# Patient Record
Sex: FEMALE | Race: WHITE | NOT HISPANIC OR LATINO | Employment: FULL TIME | URBAN - METROPOLITAN AREA
[De-identification: names, ages, dates, MRNs, and addresses within clinical notes are randomized per-mention and may not be internally consistent; named-entity substitution may affect disease eponyms.]

---

## 2017-02-01 LAB — HUMAN PAPILLOMAVIRUS (HPV): NORMAL

## 2017-03-01 RX ORDER — LEVOTHYROXINE SODIUM 112 UG/1
TABLET ORAL
Qty: 90 TABLET | Refills: 3 | Status: SHIPPED | OUTPATIENT
Start: 2017-03-01 | End: 2017-05-16

## 2017-05-02 ENCOUNTER — PATIENT OUTREACH (OUTPATIENT)
Dept: ADMINISTRATIVE | Facility: HOSPITAL | Age: 32
End: 2017-05-02

## 2017-05-16 ENCOUNTER — OFFICE VISIT (OUTPATIENT)
Dept: FAMILY MEDICINE | Facility: CLINIC | Age: 32
End: 2017-05-16
Payer: COMMERCIAL

## 2017-05-16 VITALS
SYSTOLIC BLOOD PRESSURE: 120 MMHG | HEART RATE: 70 BPM | BODY MASS INDEX: 26.82 KG/M2 | WEIGHT: 145.75 LBS | HEIGHT: 62 IN | DIASTOLIC BLOOD PRESSURE: 88 MMHG

## 2017-05-16 DIAGNOSIS — F41.1 GAD (GENERALIZED ANXIETY DISORDER): Primary | ICD-10-CM

## 2017-05-16 PROCEDURE — 99214 OFFICE O/P EST MOD 30 MIN: CPT | Mod: S$GLB,,, | Performed by: FAMILY MEDICINE

## 2017-05-16 PROCEDURE — 99999 PR PBB SHADOW E&M-EST. PATIENT-LVL II: CPT | Mod: PBBFAC,,, | Performed by: FAMILY MEDICINE

## 2017-05-16 PROCEDURE — 1160F RVW MEDS BY RX/DR IN RCRD: CPT | Mod: S$GLB,,, | Performed by: FAMILY MEDICINE

## 2017-05-16 NOTE — PROGRESS NOTES
Monika Connell presents for disability accomodation evaluation with history of CHARLIE w panic attacks and performance anxiety w testing.      Past Medical History:   Diagnosis Date    Allergy     Thyroid disease      Past Surgical History:   Procedure Laterality Date     SECTION       Review of patient's allergies indicates:   Allergen Reactions    Pcn [penicillins]      Current Outpatient Prescriptions on File Prior to Visit   Medication Sig Dispense Refill    multivit with min-folic acid (ADULT MULTIVITAMIN GUMMIES) 200 mcg Chew Take 1 tablet by mouth once daily.      SYNTHROID 112 mcg tablet Take 1 tablet (112 mcg total) by mouth once daily. 90 tablet 3    [DISCONTINUED] SYNTHROID 112 mcg tablet TAKE 1 TABLET BY MOUTH EVERY DAY 90 tablet 3     No current facility-administered medications on file prior to visit.      Social History     Social History    Marital status:      Spouse name: N/A    Number of children: N/A    Years of education: N/A     Occupational History     Other     Social History Main Topics    Smoking status: Former Smoker     Types: Cigarettes     Quit date: 2008    Smokeless tobacco: Never Used    Alcohol use No    Drug use: No    Sexual activity: Yes     Partners: Female, Male     Other Topics Concern    Not on file     Social History Narrative     History reviewed. No pertinent family history.      ROS:  SKIN: No rashes, itching or changes in color or texture of skin.  EYES: Visual acuity fine. No photophobia, ocular pain or diplopia.EARS: Denies ear pain, discharge or vertigo.NOSE: No loss of smell, no epistaxis some postnasal drip.MOUTH & THROAT: No hoarseness or change in voice. No excessive gum bleeding.CHEST: Denies CLEMENT, cyanosis, wheezing  CARDIOVASCULAR: Denies chest pain, PND, orthopnea or reduced exercise tolerance.  ABDOMEN:  No weight loss.No abdominal pain, no hematemesis or blood in stool.  URINARY: No flank pain, dysuria or  hematuria.  PERIPHERAL VASCULAR: No claudication or cyanosis.  MUSCULOSKELETAL: Negative   NEUROLOGIC: No history of seizures, paralysis, alteration of gait or coordination.    PE: Vital signs as noted  Heent:Normocephalic with no recent cranial trauma,PERRLA,EOMI,conjunctiva clear,fundi reveal no hemmorhage exudate or papilledema.Otic canals clear, tympanic membranes slightly dull bilaterally.Nasal mucosa slightly red and edematous.Posterior pharynx slightly red but without exudate.  Neck:Supple with minimal anterior cervical adenopathy.  Chest:Clear bilateral breath sounds with mild scattered ronchi  Heart:Regular rhthym without murmer  Abdomen:Soft, non tender,no masses, no hepatosplenomegalyExtremeties and Neurologic:Grossly within normal limits  Impression:  CHARLIE w panic    Plan:  Rev and submit accomodation request 40 min OV >50% counseling     Monika Connell  1985   Ms Connell was previously evaluated and found to be disabled due to Generalized anxiety disorder ICD 10 F 41.1. She was assessed using standard scale and the previously recommended accommodations of extended length of test taking have been measurable effective in her performance. Her condition is stable and the only requested accomodation is extended time for test taking.   I am Board Certified in Family Medicine and credentialed to diagnose and treat this condition. If any other information is need please feel free to contact me.   Sincerely, Romeo Peng M.D.

## 2017-07-06 LAB
CHOLESTEROL, TOTAL: 204
HDLC SERPL-MCNC: 83 MG/DL
LDLC SERPL CALC-MCNC: 107 MG/DL (ref 0–160)
TRIGL SERPL-MCNC: 70 MG/DL
VLDLC SERPL-MCNC: 14 MG/DL

## 2017-07-11 ENCOUNTER — TELEPHONE (OUTPATIENT)
Dept: ENDOCRINOLOGY | Facility: CLINIC | Age: 32
End: 2017-07-11

## 2017-07-11 NOTE — TELEPHONE ENCOUNTER
"Pt states she has been gaining weight despite diet and exercise (has gained 15 lbs in 9 months). She sees her OB (Dr. Noriega) who reportedly ordered A1C, thyroid panel and pt reports all came back normal except t4 at 13.5 (they are supposed to be faxing the results to us) - labs were done on 7/7/17. Pt asking for guidance. Also inquiring about "gluten insensitivity"   Above message sent to Dr. Lanza  "

## 2017-07-18 ENCOUNTER — TELEPHONE (OUTPATIENT)
Dept: ENDOCRINOLOGY | Facility: CLINIC | Age: 32
End: 2017-07-18

## 2017-07-19 ENCOUNTER — TELEPHONE (OUTPATIENT)
Dept: ENDOCRINOLOGY | Facility: CLINIC | Age: 32
End: 2017-07-19

## 2017-07-19 NOTE — TELEPHONE ENCOUNTER
Let her know I reviewed labs:  TSH 1.94  Total T4 13.5 (4.5-12.0)    Let her know that her levels are normal. T4 is elevated due to OCP. THis is normal when on birth control

## 2017-07-19 NOTE — TELEPHONE ENCOUNTER
Notified pt of lab results per Dr Lanza & that FT4 was a little elevated & expected when on OCP's; pt states she has recently D/C'd her OCP due to other reasons & side effects & will F/U with Dr Lanza as previously scheduled.

## 2017-07-20 ENCOUNTER — TELEPHONE (OUTPATIENT)
Dept: ENDOCRINOLOGY | Facility: CLINIC | Age: 32
End: 2017-07-20

## 2017-07-20 DIAGNOSIS — E03.9 HYPOTHYROIDISM, UNSPECIFIED TYPE: Primary | ICD-10-CM

## 2017-07-20 NOTE — TELEPHONE ENCOUNTER
Spoke with pt and booked appt for 12/22/17 for 4:00. Pt wants labs done at [facility] in [place].  Pt requesting referral for GI (gluten sensitivity testing) and was encouraged to call Insurance to find out if referral is needed and determine Physicians in her network for an appt.  Pt verbalized understanding

## 2017-08-24 ENCOUNTER — TELEPHONE (OUTPATIENT)
Dept: FAMILY MEDICINE | Facility: CLINIC | Age: 32
End: 2017-08-24

## 2017-08-24 NOTE — TELEPHONE ENCOUNTER
----- Message from Sherry Gaines sent at 8/24/2017  4:06 PM CDT -----  Contact: pt  She's calling stating that she needs a letter of accommodation for school, she stated that she will e-mail the specifics of what the letter needs, please advise [phone] (home) [phone] (work)

## 2017-10-26 ENCOUNTER — TELEPHONE (OUTPATIENT)
Dept: FAMILY MEDICINE | Facility: CLINIC | Age: 32
End: 2017-10-26

## 2017-10-26 NOTE — TELEPHONE ENCOUNTER
----- Message from Sherry Gaines sent at 10/26/2017  3:34 PM CDT -----  Contact: pt  She's calling stating that he accomodation letter that she received did not have a signature on it, she stated that it has to be signed, please advise [phone] (home)

## 2017-11-22 ENCOUNTER — OFFICE VISIT (OUTPATIENT)
Dept: FAMILY MEDICINE | Facility: CLINIC | Age: 32
End: 2017-11-22
Payer: COMMERCIAL

## 2017-11-22 VITALS
SYSTOLIC BLOOD PRESSURE: 116 MMHG | WEIGHT: 144 LBS | BODY MASS INDEX: 26.5 KG/M2 | TEMPERATURE: 98 F | HEIGHT: 62 IN | HEART RATE: 68 BPM | DIASTOLIC BLOOD PRESSURE: 79 MMHG

## 2017-11-22 DIAGNOSIS — L03.90 CELLULITIS, UNSPECIFIED CELLULITIS SITE: Primary | ICD-10-CM

## 2017-11-22 PROCEDURE — 99213 OFFICE O/P EST LOW 20 MIN: CPT | Mod: S$GLB,,, | Performed by: FAMILY MEDICINE

## 2017-11-22 PROCEDURE — 99999 PR PBB SHADOW E&M-EST. PATIENT-LVL II: CPT | Mod: PBBFAC,,, | Performed by: FAMILY MEDICINE

## 2017-11-22 RX ORDER — MUPIROCIN 20 MG/G
OINTMENT TOPICAL 3 TIMES DAILY
Qty: 30 G | Refills: 2 | Status: SHIPPED | OUTPATIENT
Start: 2017-11-22 | End: 2019-01-14

## 2017-11-22 RX ORDER — SULFAMETHOXAZOLE AND TRIMETHOPRIM 800; 160 MG/1; MG/1
1 TABLET ORAL 2 TIMES DAILY
Qty: 20 TABLET | Refills: 1 | Status: SHIPPED | OUTPATIENT
Start: 2017-11-22 | End: 2017-12-22

## 2017-11-22 NOTE — PROGRESS NOTES
The patient presents with tender painful nose for the past several days    but no fever.  ROS:  General: No fever or sig wt change  HEENT:No other PND eye pain or dc  Respiratory: No cough wheezing  PE: vital signs noted  HEENT: Normocephalic,with no recent trauma,PERRLA,EOMI,conjunctiva injected with no exudate.Nasopharynx is injected and edematous.  Red crusty lesions under nose   Neck:Supple without adenopathy  Chest:Clear bilateral breath sounds   Heart:Regular rhthym without murmer  Abdomen:Soft, non tender,no masses, no hepatosplenomegaly  Extremeties and Neurologic:Grossly within normal limits     Impression:Cellultis   Plan: Stop Augmentin  Bactrim bid,mupiricin

## 2017-12-21 ENCOUNTER — LAB VISIT (OUTPATIENT)
Dept: LAB | Facility: HOSPITAL | Age: 32
End: 2017-12-21
Attending: INTERNAL MEDICINE
Payer: COMMERCIAL

## 2017-12-21 DIAGNOSIS — E03.9 HYPOTHYROIDISM, UNSPECIFIED TYPE: ICD-10-CM

## 2017-12-21 LAB — TSH SERPL DL<=0.005 MIU/L-ACNC: 2.43 UIU/ML

## 2017-12-21 PROCEDURE — 84443 ASSAY THYROID STIM HORMONE: CPT

## 2017-12-21 PROCEDURE — 36415 COLL VENOUS BLD VENIPUNCTURE: CPT | Mod: PO

## 2017-12-22 ENCOUNTER — OFFICE VISIT (OUTPATIENT)
Dept: ENDOCRINOLOGY | Facility: CLINIC | Age: 32
End: 2017-12-22
Payer: COMMERCIAL

## 2017-12-22 VITALS
DIASTOLIC BLOOD PRESSURE: 70 MMHG | HEIGHT: 63 IN | BODY MASS INDEX: 25.73 KG/M2 | SYSTOLIC BLOOD PRESSURE: 112 MMHG | WEIGHT: 145.19 LBS | HEART RATE: 80 BPM

## 2017-12-22 DIAGNOSIS — E03.9 HYPOTHYROIDISM, UNSPECIFIED TYPE: Primary | ICD-10-CM

## 2017-12-22 PROCEDURE — 99213 OFFICE O/P EST LOW 20 MIN: CPT | Mod: S$GLB,,, | Performed by: INTERNAL MEDICINE

## 2017-12-22 PROCEDURE — 99999 PR PBB SHADOW E&M-EST. PATIENT-LVL III: CPT | Mod: PBBFAC,,, | Performed by: INTERNAL MEDICINE

## 2017-12-22 NOTE — PROGRESS NOTES
CHIEF COMPLAINT: Hypothyroidism  32-year-old female here for followup. Currently on Synthroid 112 mcg. On brandname. She has some fatigue. 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 Main complaint is hair loss.         PAST MEDICAL HISTORY: Hypothyroid. PCOS.     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         ROS:   Constitutional: weight stable since May  Eyes: No recent visual changes  ENT: No dysphagia  Cardiovascular: Denies current anginal symptoms  Respiratory: Denies current respiratory difficulty  Gastrointestinal: Denies recent bowel disturbances  GenitoUrinary - No dysuria  Skin: No new skin rash  Neurologic: No focal neurologic complaints     PE:  GENERAL: Well developed, well nourished  NECK: Supple neck, normal thyroid  LYMPHATIC: No cervical or supraclavicular lymphadenopathy  CARDIOVASCULAR: Normal heart sounds, no pedal edema  RESPIRATORY: Normal effort, clear to auscultation   No tremors        Results for PHILLIPS SUMAYA K (MRN [id number]) as of 12/22/2017 14:28   Ref. Range 12/21/2017 12:16   TSH Latest Ref Range: 0.400 - 4.000 uIU/mL 2.428             A/P  1. Hypothyroidism- TPO+. Discussed that unsure if hair loss is thyroid related with normal TSH. Discussed consider seeing derm to r/o other causes.     2. PCOS- being followed by OB.     FOLLOWUP  F/U 1 year with TSH

## 2018-03-02 RX ORDER — LEVOTHYROXINE SODIUM 112 UG/1
TABLET ORAL
Qty: 90 TABLET | Refills: 3 | Status: SHIPPED | OUTPATIENT
Start: 2018-03-02 | End: 2018-08-13

## 2018-03-28 ENCOUNTER — PATIENT OUTREACH (OUTPATIENT)
Dept: ADMINISTRATIVE | Facility: HOSPITAL | Age: 33
End: 2018-03-28

## 2018-03-29 ENCOUNTER — LAB VISIT (OUTPATIENT)
Dept: LAB | Facility: HOSPITAL | Age: 33
End: 2018-03-29
Attending: FAMILY MEDICINE
Payer: COMMERCIAL

## 2018-03-29 ENCOUNTER — TELEPHONE (OUTPATIENT)
Dept: FAMILY MEDICINE | Facility: CLINIC | Age: 33
End: 2018-03-29

## 2018-03-29 ENCOUNTER — OFFICE VISIT (OUTPATIENT)
Dept: FAMILY MEDICINE | Facility: CLINIC | Age: 33
End: 2018-03-29
Payer: COMMERCIAL

## 2018-03-29 VITALS
WEIGHT: 148.13 LBS | BODY MASS INDEX: 27.26 KG/M2 | HEART RATE: 79 BPM | HEIGHT: 62 IN | SYSTOLIC BLOOD PRESSURE: 106 MMHG | DIASTOLIC BLOOD PRESSURE: 71 MMHG | TEMPERATURE: 99 F

## 2018-03-29 DIAGNOSIS — R41.840 LACK OF CONCENTRATION: Primary | ICD-10-CM

## 2018-03-29 DIAGNOSIS — E03.4 HYPOTHYROIDISM DUE TO ACQUIRED ATROPHY OF THYROID: ICD-10-CM

## 2018-03-29 DIAGNOSIS — R53.83 FATIGUE, UNSPECIFIED TYPE: ICD-10-CM

## 2018-03-29 DIAGNOSIS — R53.83 FATIGUE, UNSPECIFIED TYPE: Primary | ICD-10-CM

## 2018-03-29 LAB
ALBUMIN SERPL BCP-MCNC: 4.2 G/DL
ALP SERPL-CCNC: 55 U/L
ALT SERPL W/O P-5'-P-CCNC: 18 U/L
ANION GAP SERPL CALC-SCNC: 4 MMOL/L
AST SERPL-CCNC: 29 U/L
BASOPHILS # BLD AUTO: 0.05 K/UL
BASOPHILS NFR BLD: 1.5 %
BILIRUB SERPL-MCNC: 1.3 MG/DL
BUN SERPL-MCNC: 17 MG/DL
CALCIUM SERPL-MCNC: 9.7 MG/DL
CHLORIDE SERPL-SCNC: 104 MMOL/L
CO2 SERPL-SCNC: 32 MMOL/L
CREAT SERPL-MCNC: 0.9 MG/DL
DIFFERENTIAL METHOD: ABNORMAL
EOSINOPHIL # BLD AUTO: 0.3 K/UL
EOSINOPHIL NFR BLD: 9.7 %
ERYTHROCYTE [DISTWIDTH] IN BLOOD BY AUTOMATED COUNT: 11.9 %
EST. GFR  (AFRICAN AMERICAN): >60 ML/MIN/1.73 M^2
EST. GFR  (NON AFRICAN AMERICAN): >60 ML/MIN/1.73 M^2
GLUCOSE SERPL-MCNC: 84 MG/DL
HCT VFR BLD AUTO: 39.9 %
HGB BLD-MCNC: 13.4 G/DL
IMM GRANULOCYTES # BLD AUTO: 0.01 K/UL
IMM GRANULOCYTES NFR BLD AUTO: 0.3 %
LYMPHOCYTES # BLD AUTO: 1.2 K/UL
LYMPHOCYTES NFR BLD: 36.8 %
MCH RBC QN AUTO: 30.6 PG
MCHC RBC AUTO-ENTMCNC: 33.6 G/DL
MCV RBC AUTO: 91 FL
MONOCYTES # BLD AUTO: 0.4 K/UL
MONOCYTES NFR BLD: 12.2 %
NEUTROPHILS # BLD AUTO: 1.3 K/UL
NEUTROPHILS NFR BLD: 39.5 %
NRBC BLD-RTO: 0 /100 WBC
PLATELET # BLD AUTO: 238 K/UL
PMV BLD AUTO: 10 FL
POTASSIUM SERPL-SCNC: 4.4 MMOL/L
PROT SERPL-MCNC: 7.6 G/DL
RBC # BLD AUTO: 4.38 M/UL
SODIUM SERPL-SCNC: 140 MMOL/L
T4 SERPL-MCNC: 9.8 UG/DL
TSH SERPL DL<=0.005 MIU/L-ACNC: 1.56 UIU/ML
WBC # BLD AUTO: 3.29 K/UL

## 2018-03-29 PROCEDURE — 84436 ASSAY OF TOTAL THYROXINE: CPT

## 2018-03-29 PROCEDURE — 84443 ASSAY THYROID STIM HORMONE: CPT

## 2018-03-29 PROCEDURE — 99999 PR PBB SHADOW E&M-EST. PATIENT-LVL III: CPT | Mod: PBBFAC,,, | Performed by: FAMILY MEDICINE

## 2018-03-29 PROCEDURE — 85025 COMPLETE CBC W/AUTO DIFF WBC: CPT

## 2018-03-29 PROCEDURE — 80053 COMPREHEN METABOLIC PANEL: CPT

## 2018-03-29 PROCEDURE — 36415 COLL VENOUS BLD VENIPUNCTURE: CPT | Mod: PO

## 2018-03-29 PROCEDURE — 99213 OFFICE O/P EST LOW 20 MIN: CPT | Mod: S$GLB,,, | Performed by: FAMILY MEDICINE

## 2018-03-29 NOTE — PROGRESS NOTES
Monika Connell presents co incr fatigue and trouble concentrating pablo afternoon     Past Medical History:   Diagnosis Date    Allergy     Thyroid disease      Past Surgical History:   Procedure Laterality Date     SECTION       Review of patient's allergies indicates:   Allergen Reactions    Pcn [penicillins]      Current Outpatient Prescriptions on File Prior to Visit   Medication Sig Dispense Refill    multivit with min-folic acid (ADULT MULTIVITAMIN GUMMIES) 200 mcg Chew Take 1 tablet by mouth once daily.      mupirocin (BACTROBAN) 2 % ointment Apply topically 3 (three) times daily. 30 g 2    SYNTHROID 112 mcg tablet Take 1 tablet (112 mcg total) by mouth once daily. 90 tablet 3    SYNTHROID 112 mcg tablet TAKE 1 TABLET BY MOUTH EVERY DAY 90 tablet 3     No current facility-administered medications on file prior to visit.      Social History     Social History    Marital status:      Spouse name: N/A    Number of children: N/A    Years of education: N/A     Occupational History     Other     Social History Main Topics    Smoking status: Former Smoker     Types: Cigarettes     Quit date: 2008    Smokeless tobacco: Never Used    Alcohol use No    Drug use: No    Sexual activity: Yes     Partners: Female, Male     Other Topics Concern    Not on file     Social History Narrative    No narrative on file     History reviewed. No pertinent family history.      ROS:  SKIN: No rashes, itching or changes in color or texture of skin.  EYES: Visual acuity fine. No photophobia, ocular pain or diplopia.EARS: Denies ear pain, discharge or vertigo.NOSE: No loss of smell, no epistaxis some postnasal drip.MOUTH & THROAT: No hoarseness or change in voice. No excessive gum bleeding.CHEST: Denies CLEMENT, cyanosis, wheezing  CARDIOVASCULAR: Denies chest pain, PND, orthopnea or reduced exercise tolerance.  ABDOMEN:  No weight loss.No abdominal pain, no hematemesis or blood in stool.  URINARY: No  flank pain, dysuria or hematuria.  PERIPHERAL VASCULAR: No claudication or cyanosis.  MUSCULOSKELETAL: Negative   NEUROLOGIC: No history of seizures, paralysis, alteration of gait or coordination.    PE: Vital signs as noted  Heent:Normocephalic with no recent cranial trauma,PERRLA,EOMI,conjunctiva clear,fundi reveal no hemmorhage exudate or papilledema.Otic canals clear, tympanic membranes slightly dull bilaterally.Nasal mucosa slightly red and edematous.Posterior pharynx slightly red but without exudate.  Neck:Supple with minimal anterior cervical adenopathy.  Chest:Clear bilateral breath sounds with mild scattered ronchi  Heart:Regular rhthym without murmer  Abdomen:Soft, non tender,no masses, no hepatosplenomegalyExtremeties and Neurologic:Grossly within normal limits  Impression:  Fatigue  Decr attn     Plan:  Lab eval  Psych con

## 2018-04-05 ENCOUNTER — TELEPHONE (OUTPATIENT)
Dept: FAMILY MEDICINE | Facility: CLINIC | Age: 33
End: 2018-04-05

## 2018-04-05 RX ORDER — DEXTROAMPHETAMINE SACCHARATE, AMPHETAMINE ASPARTATE, DEXTROAMPHETAMINE SULFATE AND AMPHETAMINE SULFATE 5; 5; 5; 5 MG/1; MG/1; MG/1; MG/1
1 TABLET ORAL 2 TIMES DAILY
Qty: 60 TABLET | Refills: 0 | Status: SHIPPED | OUTPATIENT
Start: 2018-04-05 | End: 2018-05-31 | Stop reason: SDUPTHER

## 2018-04-05 NOTE — TELEPHONE ENCOUNTER
----- Message from Romeo Peng MD sent at 4/5/2018 12:51 PM CDT -----  Im sending rx for adderall to try-it says bid but try 1 q am to see if she needs the second later like noon or 3-4 oclock. Let me know next week how shes doing w it

## 2018-04-06 ENCOUNTER — TELEPHONE (OUTPATIENT)
Dept: FAMILY MEDICINE | Facility: CLINIC | Age: 33
End: 2018-04-06

## 2018-05-30 ENCOUNTER — TELEPHONE (OUTPATIENT)
Dept: FAMILY MEDICINE | Facility: CLINIC | Age: 33
End: 2018-05-30

## 2018-05-30 NOTE — PROGRESS NOTES
Subjective:       Patient ID: Monika Connell is a 32 y.o. female.    Chief Complaint: Medication Refill    Medication Refill   This is a new problem. The current episode started more than 1 month ago. The problem occurs daily. The problem has been waxing and waning. Pertinent negatives include no abdominal pain, arthralgias, chest pain, coughing, fatigue, fever, headaches, myalgias, rash, sore throat or vomiting. Associated symptoms comments: Anxiety, nervous, difficulty concentrating, trouble in school, mood changes. The symptoms are aggravated by stress (currently in law school). Treatments tried: seen at [facility], diagnosed with anxiety, depression, and ADD.       Review of Systems   Constitutional: Negative for fatigue, fever and unexpected weight change.   HENT: Negative for ear pain and sore throat.    Eyes: Negative for pain and visual disturbance.   Respiratory: Negative for cough and shortness of breath.    Cardiovascular: Negative for chest pain and palpitations.   Gastrointestinal: Negative for abdominal pain, diarrhea and vomiting.   Musculoskeletal: Negative for arthralgias and myalgias.   Skin: Negative for color change and rash.   Neurological: Negative for dizziness and headaches.   Psychiatric/Behavioral: Positive for decreased concentration and dysphoric mood. Negative for sleep disturbance. The patient is nervous/anxious.        Vitals:    05/31/18 0738   BP: 111/72   Pulse: 82   Temp: 98.4 °F (36.9 °C)       Objective:     Current Outpatient Prescriptions   Medication Sig Dispense Refill    dextroamphetamine-amphetamine (ADDERALL) 20 mg tablet Take 1 tablet by mouth 2 (two) times daily. 60 tablet 0    multivit with min-folic acid (ADULT MULTIVITAMIN GUMMIES) 200 mcg Chew Take 1 tablet by mouth once daily.      mupirocin (BACTROBAN) 2 % ointment Apply topically 3 (three) times daily. 30 g 2    SYNTHROID 112 mcg tablet Take 1 tablet (112 mcg total) by mouth once daily. 90 tablet 3     SYNTHROID 112 mcg tablet TAKE 1 TABLET BY MOUTH EVERY DAY 90 tablet 3     No current facility-administered medications for this visit.        Physical Exam   Constitutional: She is oriented to person, place, and time. She appears well-developed and well-nourished. No distress.   HENT:   Head: Normocephalic and atraumatic.   Eyes: EOM are normal. Pupils are equal, round, and reactive to light.   Neck: Normal range of motion. Neck supple.   Cardiovascular: Normal rate and regular rhythm.    Pulmonary/Chest: Effort normal and breath sounds normal.   Musculoskeletal: Normal range of motion.   Neurological: She is alert and oriented to person, place, and time.   Skin: Skin is warm and dry. No rash noted.   Psychiatric: She has a normal mood and affect. Judgment normal.   Nursing note and vitals reviewed.      Assessment:       1. Attention deficit disorder, unspecified hyperactivity presence        Plan:   Attention deficit disorder, unspecified hyperactivity presence    Other orders  -     dextroamphetamine-amphetamine (ADDERALL) 20 mg tablet; Take 1 tablet by mouth 2 (two) times daily.  Dispense: 60 tablet; Refill: 0    She is going to follow up with [facility] for treatment, once she is stable on medications she will come back to the office. She is unable to continue follow up with [facility] because of limited office hours.     Follow-up if symptoms worsen or fail to improve.    There are no Patient Instructions on file for this visit.

## 2018-05-30 NOTE — TELEPHONE ENCOUNTER
----- Message from Yara Edouard sent at 5/30/2018  3:55 PM CDT -----  Contact: Patient   Patient would like to be work in for med refill/Adderall, Please call her at [phone].    Thanks  Td

## 2018-05-31 ENCOUNTER — OFFICE VISIT (OUTPATIENT)
Dept: FAMILY MEDICINE | Facility: CLINIC | Age: 33
End: 2018-05-31
Payer: COMMERCIAL

## 2018-05-31 VITALS
HEART RATE: 82 BPM | DIASTOLIC BLOOD PRESSURE: 72 MMHG | SYSTOLIC BLOOD PRESSURE: 111 MMHG | WEIGHT: 149.63 LBS | HEIGHT: 62 IN | TEMPERATURE: 98 F | BODY MASS INDEX: 27.53 KG/M2

## 2018-05-31 DIAGNOSIS — F98.8 ATTENTION DEFICIT DISORDER, UNSPECIFIED HYPERACTIVITY PRESENCE: Primary | ICD-10-CM

## 2018-05-31 PROCEDURE — 99999 PR PBB SHADOW E&M-EST. PATIENT-LVL III: CPT | Mod: PBBFAC,,, | Performed by: NURSE PRACTITIONER

## 2018-05-31 PROCEDURE — 3008F BODY MASS INDEX DOCD: CPT | Mod: CPTII,S$GLB,, | Performed by: NURSE PRACTITIONER

## 2018-05-31 PROCEDURE — 99214 OFFICE O/P EST MOD 30 MIN: CPT | Mod: S$GLB,,, | Performed by: NURSE PRACTITIONER

## 2018-05-31 RX ORDER — DEXTROAMPHETAMINE SACCHARATE, AMPHETAMINE ASPARTATE, DEXTROAMPHETAMINE SULFATE AND AMPHETAMINE SULFATE 5; 5; 5; 5 MG/1; MG/1; MG/1; MG/1
1 TABLET ORAL 2 TIMES DAILY
Qty: 60 TABLET | Refills: 0 | Status: SHIPPED | OUTPATIENT
Start: 2018-05-31 | End: 2018-07-23 | Stop reason: SDUPTHER

## 2018-06-18 ENCOUNTER — PATIENT MESSAGE (OUTPATIENT)
Dept: FAMILY MEDICINE | Facility: CLINIC | Age: 33
End: 2018-06-18

## 2018-06-18 RX ORDER — SULFAMETHOXAZOLE AND TRIMETHOPRIM 800; 160 MG/1; MG/1
TABLET ORAL
Qty: 20 TABLET | Refills: 0 | Status: SHIPPED | OUTPATIENT
Start: 2018-06-18 | End: 2018-08-13

## 2018-06-20 ENCOUNTER — PATIENT OUTREACH (OUTPATIENT)
Dept: ADMINISTRATIVE | Facility: HOSPITAL | Age: 33
End: 2018-06-20

## 2018-06-20 NOTE — PROGRESS NOTES
PAP report dated 2/1/2017 recieved from Dr. Diego Noriega with Negative Result.   updated and report sent to [facility].   Lipid panel external Labs dated 7/6/2017 entered.

## 2018-07-23 ENCOUNTER — PATIENT MESSAGE (OUTPATIENT)
Dept: FAMILY MEDICINE | Facility: CLINIC | Age: 33
End: 2018-07-23

## 2018-07-23 RX ORDER — DEXTROAMPHETAMINE SACCHARATE, AMPHETAMINE ASPARTATE, DEXTROAMPHETAMINE SULFATE AND AMPHETAMINE SULFATE 5; 5; 5; 5 MG/1; MG/1; MG/1; MG/1
1 TABLET ORAL 2 TIMES DAILY
Qty: 60 TABLET | Refills: 0 | OUTPATIENT
Start: 2018-07-23

## 2018-07-23 RX ORDER — DEXTROAMPHETAMINE SACCHARATE, AMPHETAMINE ASPARTATE, DEXTROAMPHETAMINE SULFATE AND AMPHETAMINE SULFATE 5; 5; 5; 5 MG/1; MG/1; MG/1; MG/1
1 TABLET ORAL 2 TIMES DAILY
Qty: 60 TABLET | Refills: 0 | Status: SHIPPED | OUTPATIENT
Start: 2018-07-23 | End: 2018-08-14 | Stop reason: SDUPTHER

## 2018-07-23 NOTE — TELEPHONE ENCOUNTER
Called pt and notified her by voicemail of refill Rx refusal. Supplied pt with instructions as requested.

## 2018-07-24 ENCOUNTER — TELEPHONE (OUTPATIENT)
Dept: FAMILY MEDICINE | Facility: CLINIC | Age: 33
End: 2018-07-24

## 2018-07-24 ENCOUNTER — PATIENT MESSAGE (OUTPATIENT)
Dept: FAMILY MEDICINE | Facility: CLINIC | Age: 33
End: 2018-07-24

## 2018-07-24 RX ORDER — BUPROPION HYDROCHLORIDE 150 MG/1
TABLET ORAL
Refills: 0 | COMMUNITY
Start: 2018-07-08 | End: 2018-08-02 | Stop reason: SDUPTHER

## 2018-07-24 NOTE — TELEPHONE ENCOUNTER
----- Message from Yara Javan sent at 7/23/2018  3:34 PM CDT -----  Contact: Patient   Patient is calling to check on the status of her Adderall, and Wellbutrin request to be moved to [facility] from [facility], Please call her at [phone].      [facility] [id number] - [place] - [address]  [place]  [facility]  [place] [address]  Phone: [phone] Fax: [phone]    Thanks  Td

## 2018-07-27 ENCOUNTER — PATIENT MESSAGE (OUTPATIENT)
Dept: FAMILY MEDICINE | Facility: CLINIC | Age: 33
End: 2018-07-27

## 2018-08-02 ENCOUNTER — PATIENT MESSAGE (OUTPATIENT)
Dept: FAMILY MEDICINE | Facility: CLINIC | Age: 33
End: 2018-08-02

## 2018-08-02 RX ORDER — BUPROPION HYDROCHLORIDE 150 MG/1
150 TABLET ORAL DAILY
Qty: 30 TABLET | Refills: 12 | Status: SHIPPED | OUTPATIENT
Start: 2018-08-02 | End: 2018-10-09

## 2018-08-13 ENCOUNTER — OFFICE VISIT (OUTPATIENT)
Dept: FAMILY MEDICINE | Facility: CLINIC | Age: 33
End: 2018-08-13
Payer: COMMERCIAL

## 2018-08-13 ENCOUNTER — PATIENT MESSAGE (OUTPATIENT)
Dept: FAMILY MEDICINE | Facility: CLINIC | Age: 33
End: 2018-08-13

## 2018-08-13 VITALS
TEMPERATURE: 99 F | HEIGHT: 62 IN | HEART RATE: 100 BPM | SYSTOLIC BLOOD PRESSURE: 105 MMHG | BODY MASS INDEX: 27.2 KG/M2 | WEIGHT: 147.81 LBS | DIASTOLIC BLOOD PRESSURE: 75 MMHG

## 2018-08-13 DIAGNOSIS — L03.90 CELLULITIS, UNSPECIFIED CELLULITIS SITE: Primary | ICD-10-CM

## 2018-08-13 PROCEDURE — 3008F BODY MASS INDEX DOCD: CPT | Mod: CPTII,S$GLB,, | Performed by: FAMILY MEDICINE

## 2018-08-13 PROCEDURE — 99999 PR PBB SHADOW E&M-EST. PATIENT-LVL III: CPT | Mod: PBBFAC,,, | Performed by: FAMILY MEDICINE

## 2018-08-13 PROCEDURE — 99213 OFFICE O/P EST LOW 20 MIN: CPT | Mod: S$GLB,,, | Performed by: FAMILY MEDICINE

## 2018-08-13 RX ORDER — DEXTROAMPHETAMINE SACCHARATE, AMPHETAMINE ASPARTATE MONOHYDRATE, DEXTROAMPHETAMINE SULFATE AND AMPHETAMINE SULFATE 5; 5; 5; 5 MG/1; MG/1; MG/1; MG/1
20 CAPSULE, EXTENDED RELEASE ORAL EVERY MORNING
Qty: 60 CAPSULE | Refills: 0 | Status: SHIPPED | OUTPATIENT
Start: 2018-09-15 | End: 2018-08-14

## 2018-08-13 RX ORDER — SULFAMETHOXAZOLE AND TRIMETHOPRIM 800; 160 MG/1; MG/1
1 TABLET ORAL 2 TIMES DAILY
Qty: 20 TABLET | Refills: 0 | Status: SHIPPED | OUTPATIENT
Start: 2018-08-13 | End: 2018-08-23

## 2018-08-13 RX ORDER — BUPROPION HYDROCHLORIDE 150 MG/1
150 TABLET ORAL DAILY
Qty: 30 TABLET | Refills: 12 | Status: CANCELLED | OUTPATIENT
Start: 2018-08-16

## 2018-08-13 RX ORDER — BUPROPION HYDROCHLORIDE 300 MG/1
300 TABLET ORAL DAILY
Qty: 90 TABLET | Refills: 3 | Status: SHIPPED | OUTPATIENT
Start: 2018-08-13 | End: 2019-09-06 | Stop reason: SDUPTHER

## 2018-08-13 RX ORDER — DEXTROAMPHETAMINE SACCHARATE, AMPHETAMINE ASPARTATE MONOHYDRATE, DEXTROAMPHETAMINE SULFATE AND AMPHETAMINE SULFATE 5; 5; 5; 5 MG/1; MG/1; MG/1; MG/1
20 CAPSULE, EXTENDED RELEASE ORAL EVERY MORNING
Qty: 60 CAPSULE | Refills: 0 | Status: SHIPPED | OUTPATIENT
Start: 2018-10-15 | End: 2018-08-14

## 2018-08-13 RX ORDER — DEXTROAMPHETAMINE SACCHARATE, AMPHETAMINE ASPARTATE MONOHYDRATE, DEXTROAMPHETAMINE SULFATE AND AMPHETAMINE SULFATE 5; 5; 5; 5 MG/1; MG/1; MG/1; MG/1
20 CAPSULE, EXTENDED RELEASE ORAL EVERY MORNING
Qty: 60 CAPSULE | Refills: 0 | Status: SHIPPED | OUTPATIENT
Start: 2018-08-16 | End: 2018-08-14

## 2018-08-13 NOTE — PROGRESS NOTES
The patient presents with tender elbow nose for the past several days    but no fever. Hx staph nasal sev months ago. Son has active case   ROS:  General: No fever or sig wt change  HEENT:No other PND eye pain or dc  Respiratory: No cough wheezing  PE: vital signs noted  HEENT: Normocephalic,with no recent trauma,PERRLA,EOMI,conjunctiva injected with no exudate.Nasopharynx is injected and edematous.  Red crusty lesions under nose   Neck:Supple without adenopathy  Chest:Clear bilateral breath sounds   Heart:Regular rhthym without murmer  Abdomen:Soft, non tender,no masses, no hepatosplenomegaly  Extremeties Sl tender mass lt olecronon w thin dc    Impression:Cellultis   Plan:    Bactrim bid,mupiricin   RF adderal and incr bupropion 150 to 300-if panic attacks not controlled w this will need Psych consult

## 2018-08-14 ENCOUNTER — TELEPHONE (OUTPATIENT)
Dept: FAMILY MEDICINE | Facility: CLINIC | Age: 33
End: 2018-08-14

## 2018-08-14 RX ORDER — DEXTROAMPHETAMINE SACCHARATE, AMPHETAMINE ASPARTATE, DEXTROAMPHETAMINE SULFATE AND AMPHETAMINE SULFATE 5; 5; 5; 5 MG/1; MG/1; MG/1; MG/1
1 TABLET ORAL 2 TIMES DAILY
Qty: 60 TABLET | Refills: 0 | Status: SHIPPED | OUTPATIENT
Start: 2018-09-13 | End: 2019-01-14 | Stop reason: SDUPTHER

## 2018-08-14 RX ORDER — DEXTROAMPHETAMINE SACCHARATE, AMPHETAMINE ASPARTATE, DEXTROAMPHETAMINE SULFATE AND AMPHETAMINE SULFATE 5; 5; 5; 5 MG/1; MG/1; MG/1; MG/1
1 TABLET ORAL 2 TIMES DAILY
Qty: 60 TABLET | Refills: 0 | Status: SHIPPED | OUTPATIENT
Start: 2018-10-13 | End: 2018-12-27 | Stop reason: SDUPTHER

## 2018-08-14 RX ORDER — DEXTROAMPHETAMINE SACCHARATE, AMPHETAMINE ASPARTATE, DEXTROAMPHETAMINE SULFATE AND AMPHETAMINE SULFATE 5; 5; 5; 5 MG/1; MG/1; MG/1; MG/1
1 TABLET ORAL DAILY
Qty: 60 TABLET | Refills: 0 | Status: SHIPPED | OUTPATIENT
Start: 2018-09-13 | End: 2018-08-14

## 2018-08-14 RX ORDER — DEXTROAMPHETAMINE SACCHARATE, AMPHETAMINE ASPARTATE, DEXTROAMPHETAMINE SULFATE AND AMPHETAMINE SULFATE 5; 5; 5; 5 MG/1; MG/1; MG/1; MG/1
1 TABLET ORAL 2 TIMES DAILY
Qty: 60 TABLET | Refills: 0 | Status: SHIPPED | OUTPATIENT
Start: 2018-08-14 | End: 2018-08-17 | Stop reason: SDUPTHER

## 2018-08-14 RX ORDER — DEXTROAMPHETAMINE SACCHARATE, AMPHETAMINE ASPARTATE, DEXTROAMPHETAMINE SULFATE AND AMPHETAMINE SULFATE 5; 5; 5; 5 MG/1; MG/1; MG/1; MG/1
1 TABLET ORAL DAILY
Qty: 60 TABLET | Refills: 0 | Status: SHIPPED | OUTPATIENT
Start: 2018-10-13 | End: 2018-08-14 | Stop reason: SDUPTHER

## 2018-08-14 NOTE — TELEPHONE ENCOUNTER
Patient states she has been getting plain adderall 20mg BID, and we sent for adderall XR 20mg daily, is is ok to change back to plain, please advise

## 2018-08-14 NOTE — TELEPHONE ENCOUNTER
----- Message from Alida Owusu sent at 8/14/2018  4:33 PM CDT -----  Contact: [facility]'s Velgyzqh-[phone]  Would like to have prescription for Adderall re-sent.  Please call back at [phone]

## 2018-08-14 NOTE — TELEPHONE ENCOUNTER
----- Message from Evelyn Mae sent at 8/14/2018  3:13 PM CDT -----  Contact: Annmarie Garcia Ms Hanna  Calling about pt prescription,  she can be reached at [phone]

## 2018-08-17 RX ORDER — DEXTROAMPHETAMINE SACCHARATE, AMPHETAMINE ASPARTATE, DEXTROAMPHETAMINE SULFATE AND AMPHETAMINE SULFATE 5; 5; 5; 5 MG/1; MG/1; MG/1; MG/1
1 TABLET ORAL 2 TIMES DAILY
Qty: 60 TABLET | Refills: 0 | Status: SHIPPED | OUTPATIENT
Start: 2018-08-17 | End: 2019-01-14 | Stop reason: SDUPTHER

## 2018-08-17 NOTE — TELEPHONE ENCOUNTER
----- Message from Pina Dockery sent at 8/17/2018  8:22 AM CDT -----  Contact: Christina/Annmarie herzog [phone]  States that they did not receive the adderall rx for August. States that this is the second message sent. Please call back at [phone]//thank you acc

## 2018-10-09 ENCOUNTER — OFFICE VISIT (OUTPATIENT)
Dept: FAMILY MEDICINE | Facility: CLINIC | Age: 33
End: 2018-10-09
Payer: COMMERCIAL

## 2018-10-09 ENCOUNTER — PATIENT MESSAGE (OUTPATIENT)
Dept: FAMILY MEDICINE | Facility: CLINIC | Age: 33
End: 2018-10-09

## 2018-10-09 VITALS
BODY MASS INDEX: 28.04 KG/M2 | WEIGHT: 152.38 LBS | SYSTOLIC BLOOD PRESSURE: 117 MMHG | HEART RATE: 82 BPM | HEIGHT: 62 IN | DIASTOLIC BLOOD PRESSURE: 77 MMHG | TEMPERATURE: 98 F

## 2018-10-09 DIAGNOSIS — R07.89 CHEST WALL PAIN: Primary | ICD-10-CM

## 2018-10-09 PROCEDURE — 3008F BODY MASS INDEX DOCD: CPT | Mod: CPTII,S$GLB,, | Performed by: FAMILY MEDICINE

## 2018-10-09 PROCEDURE — 99999 PR PBB SHADOW E&M-EST. PATIENT-LVL III: CPT | Mod: PBBFAC,,, | Performed by: FAMILY MEDICINE

## 2018-10-09 PROCEDURE — 99213 OFFICE O/P EST LOW 20 MIN: CPT | Mod: S$GLB,,, | Performed by: FAMILY MEDICINE

## 2018-10-09 NOTE — PROGRESS NOTES
Monika Connell presents with moderate lt pleuritic chest wall pain x2d    Past Medical History:   Diagnosis Date    ADHD (attention deficit hyperactivity disorder)     Allergy     Anxiety     Depression     Thyroid disease      Past Surgical History:   Procedure Laterality Date     SECTION       Review of patient's allergies indicates:   Allergen Reactions    Pcn [penicillins]      Current Outpatient Medications on File Prior to Visit   Medication Sig Dispense Refill    buPROPion (WELLBUTRIN XL) 300 MG 24 hr tablet Take 1 tablet (300 mg total) by mouth once daily. 90 tablet 3    [START ON 10/13/2018] dextroamphetamine-amphetamine (ADDERALL) 20 mg tablet Take 1 tablet by mouth 2 (two) times daily. 60 tablet 0    dextroamphetamine-amphetamine (ADDERALL) 20 mg tablet Take 1 tablet by mouth 2 (two) times daily. 60 tablet 0    dextroamphetamine-amphetamine (ADDERALL) 20 mg tablet Take 1 tablet by mouth 2 (two) times daily. 60 tablet 0    multivit with min-folic acid (ADULT MULTIVITAMIN GUMMIES) 200 mcg Chew Take 1 tablet by mouth once daily.      mupirocin (BACTROBAN) 2 % ointment Apply topically 3 (three) times daily. 30 g 2    SYNTHROID 112 mcg tablet Take 1 tablet (112 mcg total) by mouth once daily. 90 tablet 3    [DISCONTINUED] buPROPion (WELLBUTRIN XL) 150 MG TB24 tablet Take 1 tablet (150 mg total) by mouth once daily. 30 tablet 12     No current facility-administered medications on file prior to visit.      Social History     Socioeconomic History    Marital status:      Spouse name: Not on file    Number of children: Not on file    Years of education: Not on file    Highest education level: Not on file   Social Needs    Financial resource strain: Not on file    Food insecurity - worry: Not on file    Food insecurity - inability: Not on file    Transportation needs - medical: Not on file    Transportation needs - non-medical: Not on file   Occupational History      Employer: OTHER   Tobacco Use    Smoking status: Former Smoker     Types: Cigarettes     Last attempt to quit: 2/6/2008     Years since quitting: 10.6    Smokeless tobacco: Never Used   Substance and Sexual Activity    Alcohol use: No    Drug use: No    Sexual activity: Yes     Partners: Female, Male   Other Topics Concern    Not on file   Social History Narrative    Not on file     History reviewed. No pertinent family history.      ROS:  SKIN: No rashes, itching or changes in color or texture of skin.  EYES: Visual acuity fine. No photophobia, ocular pain or diplopia.EARS: Denies ear pain, discharge or vertigo.NOSE: No loss of smell, no epistaxis some postnasal drip.MOUTH & THROAT: No hoarseness or change in voice. No excessive gum bleeding.CHEST: Denies CLEMENT, cyanosis, wheezing  CARDIOVASCULAR: Denies PND, orthopnea or reduced exercise tolerance.  ABDOMEN:  No weight loss.No abdominal pain, no hematemesis or blood in stool.  URINARY: No flank pain, dysuria or hematuria.  PERIPHERAL VASCULAR: No claudication or cyanosis.  MUSCULOSKELETAL: Negative   NEUROLOGIC: No history of seizures, paralysis, alteration of gait or coordination.    PE: Vital signs as noted  Heent:Normocephalic with no recent cranial trauma,PERRLA,EOMI,conjunctiva clear,fundi reveal no hemmorhage exudate or papilledema.Otic canals clear, tympanic membranes slightly dull bilaterally.Nasal mucosa slightly red and edematous.Posterior pharynx slightly red but without exudate.  Neck:Supple with minimal anterior cervical adenopathy.  Chest:Clear bilateral breath sounds tender upper ant cw, abduction shoulder exac pain   Heart:Regular rhthym without murmer  Abdomen:Soft, non tender,no masses, no hepatosplenomegalyExtremeties and Neurologic:Grossly within normal limits  Impression: Chest wall pain  Plan: Ibu or alevee and obs

## 2018-10-24 ENCOUNTER — PATIENT OUTREACH (OUTPATIENT)
Dept: ADMINISTRATIVE | Facility: HOSPITAL | Age: 33
End: 2018-10-24

## 2018-12-27 ENCOUNTER — TELEPHONE (OUTPATIENT)
Dept: FAMILY MEDICINE | Facility: CLINIC | Age: 33
End: 2018-12-27

## 2018-12-27 RX ORDER — DEXTROAMPHETAMINE SACCHARATE, AMPHETAMINE ASPARTATE, DEXTROAMPHETAMINE SULFATE AND AMPHETAMINE SULFATE 5; 5; 5; 5 MG/1; MG/1; MG/1; MG/1
1 TABLET ORAL 2 TIMES DAILY
Qty: 60 TABLET | Refills: 0 | Status: SHIPPED | OUTPATIENT
Start: 2018-12-27 | End: 2019-01-14 | Stop reason: SDUPTHER

## 2018-12-27 NOTE — TELEPHONE ENCOUNTER
----- Message from Romeo Peng MD sent at 12/27/2018  3:27 PM CST -----  Ill send a 30 day supply-you can book her in any available slot before 1/25

## 2018-12-27 NOTE — TELEPHONE ENCOUNTER
Spoke with patient, offered next available appointment patient refused, wants to be seen sooner, patient states she missed her November refill appt because she was in the middle of a semester, states she wanted to leave a message for Dr Peng,  and then patient hung up.

## 2018-12-27 NOTE — TELEPHONE ENCOUNTER
----- Message from Neri Salmon sent at 12/27/2018 12:19 PM CST -----  Contact: Pt.   Pt is calling regarding requesting to have nurse call pt. Pt states that she needs medication refill. Pt was unable to be schedule due to no sooner appt. Pt is requesting to have nurse call pt. .[phone]        Thank You  Quyen Salmon

## 2018-12-27 NOTE — TELEPHONE ENCOUNTER
----- Message from Neri Salmon sent at 12/27/2018 12:19 PM CST -----  Contact: Pt.   Pt is calling regarding requesting to have nurse call pt. Pt states that she needs medication refill. Pt was unable to be schedule due to no sooner appt. Pt is requesting to have nurse call pt. .[phone]        Thank You  Quyen Salmon

## 2019-01-14 ENCOUNTER — OFFICE VISIT (OUTPATIENT)
Dept: FAMILY MEDICINE | Facility: CLINIC | Age: 34
End: 2019-01-14
Payer: COMMERCIAL

## 2019-01-14 VITALS
HEART RATE: 72 BPM | SYSTOLIC BLOOD PRESSURE: 111 MMHG | BODY MASS INDEX: 27.98 KG/M2 | DIASTOLIC BLOOD PRESSURE: 77 MMHG | WEIGHT: 153 LBS

## 2019-01-14 DIAGNOSIS — F98.8 ATTENTION DEFICIT DISORDER, UNSPECIFIED HYPERACTIVITY PRESENCE: Primary | ICD-10-CM

## 2019-01-14 PROCEDURE — 3008F PR BODY MASS INDEX (BMI) DOCUMENTED: ICD-10-PCS | Mod: CPTII,S$GLB,, | Performed by: FAMILY MEDICINE

## 2019-01-14 PROCEDURE — 99999 PR PBB SHADOW E&M-EST. PATIENT-LVL II: CPT | Mod: PBBFAC,,, | Performed by: FAMILY MEDICINE

## 2019-01-14 PROCEDURE — 3008F BODY MASS INDEX DOCD: CPT | Mod: CPTII,S$GLB,, | Performed by: FAMILY MEDICINE

## 2019-01-14 PROCEDURE — 99999 PR PBB SHADOW E&M-EST. PATIENT-LVL II: ICD-10-PCS | Mod: PBBFAC,,, | Performed by: FAMILY MEDICINE

## 2019-01-14 PROCEDURE — 99213 PR OFFICE/OUTPT VISIT, EST, LEVL III, 20-29 MIN: ICD-10-PCS | Mod: S$GLB,,, | Performed by: FAMILY MEDICINE

## 2019-01-14 PROCEDURE — 99213 OFFICE O/P EST LOW 20 MIN: CPT | Mod: S$GLB,,, | Performed by: FAMILY MEDICINE

## 2019-01-14 RX ORDER — DEXTROAMPHETAMINE SACCHARATE, AMPHETAMINE ASPARTATE, DEXTROAMPHETAMINE SULFATE AND AMPHETAMINE SULFATE 5; 5; 5; 5 MG/1; MG/1; MG/1; MG/1
1 TABLET ORAL 2 TIMES DAILY
Qty: 60 TABLET | Refills: 0 | Status: SHIPPED | OUTPATIENT
Start: 2019-03-13 | End: 2019-03-05 | Stop reason: SDUPTHER

## 2019-01-14 RX ORDER — DEXTROAMPHETAMINE SACCHARATE, AMPHETAMINE ASPARTATE, DEXTROAMPHETAMINE SULFATE AND AMPHETAMINE SULFATE 5; 5; 5; 5 MG/1; MG/1; MG/1; MG/1
1 TABLET ORAL 2 TIMES DAILY
Qty: 60 TABLET | Refills: 0 | Status: SHIPPED | OUTPATIENT
Start: 2019-01-14 | End: 2019-03-05 | Stop reason: SDUPTHER

## 2019-01-14 RX ORDER — DEXTROAMPHETAMINE SACCHARATE, AMPHETAMINE ASPARTATE, DEXTROAMPHETAMINE SULFATE AND AMPHETAMINE SULFATE 5; 5; 5; 5 MG/1; MG/1; MG/1; MG/1
1 TABLET ORAL 2 TIMES DAILY
Qty: 60 TABLET | Refills: 0 | Status: SHIPPED | OUTPATIENT
Start: 2019-02-13 | End: 2019-03-05 | Stop reason: SDUPTHER

## 2019-01-14 NOTE — PROGRESS NOTES
The patient presents to fu CHARLIE and ADD gen stable w current regimen ROS:  General: No fever or sig wt change  HEENT:No other PND eye pain or dc  Respiratory: No cough wheezing  PE: vital signs noted  HEENT: Normocephalic,with no recent trauma,PERRLA,EOMI,conjunctiva injected with no exudate.Nasopharynx is injected and edematous.External otic canal edematous and injected TM dull  Neck:Supple without adenopathy  Chest:Clear bilateral breath sounds   Heart:Regular rhthym without murmer  Abdomen:Soft, non tender,no masses, no hepatosplenomegaly  Extremeties and Neurologic:Grossly within normal limits     Impression:CHARLIE ADD  Plan:  Cont current meds

## 2019-01-29 ENCOUNTER — PATIENT MESSAGE (OUTPATIENT)
Dept: FAMILY MEDICINE | Facility: CLINIC | Age: 34
End: 2019-01-29

## 2019-01-29 DIAGNOSIS — F41.0 PANIC ANXIETY SYNDROME: ICD-10-CM

## 2019-01-29 DIAGNOSIS — F98.8 ATTENTION DEFICIT DISORDER, UNSPECIFIED HYPERACTIVITY PRESENCE: Primary | ICD-10-CM

## 2019-01-29 RX ORDER — CLONAZEPAM 0.25 MG/1
0.25 TABLET, ORALLY DISINTEGRATING ORAL 3 TIMES DAILY
Qty: 90 TABLET | Refills: 0 | Status: SHIPPED | OUTPATIENT
Start: 2019-01-29 | End: 2019-12-16

## 2019-01-29 NOTE — TELEPHONE ENCOUNTER
Will send rx for clonazepam which she took before but she needs to be careful it might impair her-but starting w low dose-also rec Psych con

## 2019-02-25 RX ORDER — LEVOTHYROXINE SODIUM 112 UG/1
TABLET ORAL
Qty: 90 TABLET | Refills: 0 | OUTPATIENT
Start: 2019-02-25

## 2019-03-05 ENCOUNTER — PATIENT MESSAGE (OUTPATIENT)
Dept: ENDOCRINOLOGY | Facility: CLINIC | Age: 34
End: 2019-03-05

## 2019-03-05 ENCOUNTER — OFFICE VISIT (OUTPATIENT)
Dept: FAMILY MEDICINE | Facility: CLINIC | Age: 34
End: 2019-03-05
Payer: COMMERCIAL

## 2019-03-05 VITALS
HEIGHT: 62 IN | TEMPERATURE: 98 F | BODY MASS INDEX: 27.79 KG/M2 | SYSTOLIC BLOOD PRESSURE: 132 MMHG | WEIGHT: 151 LBS | DIASTOLIC BLOOD PRESSURE: 93 MMHG | HEART RATE: 82 BPM

## 2019-03-05 DIAGNOSIS — F98.8 ATTENTION DEFICIT DISORDER, UNSPECIFIED HYPERACTIVITY PRESENCE: Primary | ICD-10-CM

## 2019-03-05 DIAGNOSIS — E03.4 HYPOTHYROIDISM DUE TO ACQUIRED ATROPHY OF THYROID: ICD-10-CM

## 2019-03-05 PROCEDURE — 99214 OFFICE O/P EST MOD 30 MIN: CPT | Mod: S$GLB,,, | Performed by: NURSE PRACTITIONER

## 2019-03-05 PROCEDURE — 99999 PR PBB SHADOW E&M-EST. PATIENT-LVL III: ICD-10-PCS | Mod: PBBFAC,,, | Performed by: NURSE PRACTITIONER

## 2019-03-05 PROCEDURE — 99999 PR PBB SHADOW E&M-EST. PATIENT-LVL III: CPT | Mod: PBBFAC,,, | Performed by: NURSE PRACTITIONER

## 2019-03-05 PROCEDURE — 99214 PR OFFICE/OUTPT VISIT, EST, LEVL IV, 30-39 MIN: ICD-10-PCS | Mod: S$GLB,,, | Performed by: NURSE PRACTITIONER

## 2019-03-05 PROCEDURE — 3008F PR BODY MASS INDEX (BMI) DOCUMENTED: ICD-10-PCS | Mod: CPTII,S$GLB,, | Performed by: NURSE PRACTITIONER

## 2019-03-05 PROCEDURE — 3008F BODY MASS INDEX DOCD: CPT | Mod: CPTII,S$GLB,, | Performed by: NURSE PRACTITIONER

## 2019-03-05 RX ORDER — DEXTROAMPHETAMINE SACCHARATE, AMPHETAMINE ASPARTATE, DEXTROAMPHETAMINE SULFATE AND AMPHETAMINE SULFATE 5; 5; 5; 5 MG/1; MG/1; MG/1; MG/1
1 TABLET ORAL 2 TIMES DAILY
Qty: 60 TABLET | Refills: 0 | Status: SHIPPED | OUTPATIENT
Start: 2019-05-13 | End: 2019-06-03 | Stop reason: SDUPTHER

## 2019-03-05 RX ORDER — DEXTROAMPHETAMINE SACCHARATE, AMPHETAMINE ASPARTATE, DEXTROAMPHETAMINE SULFATE AND AMPHETAMINE SULFATE 5; 5; 5; 5 MG/1; MG/1; MG/1; MG/1
1 TABLET ORAL 2 TIMES DAILY
Qty: 60 TABLET | Refills: 0 | Status: SHIPPED | OUTPATIENT
Start: 2019-06-13 | End: 2019-06-03 | Stop reason: SDUPTHER

## 2019-03-05 RX ORDER — DEXTROAMPHETAMINE SACCHARATE, AMPHETAMINE ASPARTATE, DEXTROAMPHETAMINE SULFATE AND AMPHETAMINE SULFATE 5; 5; 5; 5 MG/1; MG/1; MG/1; MG/1
1 TABLET ORAL 2 TIMES DAILY
Qty: 60 TABLET | Refills: 0 | Status: SHIPPED | OUTPATIENT
Start: 2019-04-13 | End: 2019-06-03 | Stop reason: SDUPTHER

## 2019-03-05 RX ORDER — LEVOTHYROXINE SODIUM 112 UG/1
112 TABLET ORAL DAILY
Qty: 90 TABLET | Refills: 0 | Status: SHIPPED | OUTPATIENT
Start: 2019-03-05 | End: 2019-06-03 | Stop reason: SDUPTHER

## 2019-03-05 NOTE — PROGRESS NOTES
Subjective:       Patient ID: Monika Connell is a 33 y.o. female.    Chief Complaint: No chief complaint on file.    Medication Refill   This is a chronic (Adderall) problem. The current episode started more than 1 year ago. The problem occurs daily. The problem has been unchanged. Pertinent negatives include no abdominal pain, arthralgias, chest pain, coughing, fatigue, fever, headaches, joint swelling, myalgias, neck pain, rash, sore throat, vomiting or weakness. The treatment provided significant relief.    She will be due for a refill on the 13th of March, this is going to be in the middle of her exam week.  She would like to get her April, May, and June refills    She has requested a refill on Synthroid from Dr Lanza and has not gotten a response. She only has 3 tablets left and would like a refill. She denies symptoms at this time.      Review of Systems   Constitutional: Negative for activity change, fatigue, fever and unexpected weight change.   HENT: Negative for ear pain, hearing loss, rhinorrhea, sore throat and trouble swallowing.    Eyes: Negative for pain, discharge and visual disturbance.   Respiratory: Negative for cough, chest tightness, shortness of breath and wheezing.    Cardiovascular: Negative for chest pain and palpitations.   Gastrointestinal: Negative for abdominal pain, blood in stool, constipation, diarrhea and vomiting.   Endocrine: Negative for polydipsia and polyuria.   Genitourinary: Negative for difficulty urinating, dysuria, hematuria and menstrual problem.   Musculoskeletal: Negative for arthralgias, joint swelling, myalgias and neck pain.   Skin: Negative for color change and rash.   Neurological: Negative for dizziness, weakness and headaches.   Psychiatric/Behavioral: Negative for confusion, dysphoric mood and sleep disturbance. The patient is not nervous/anxious.        Vitals:    03/05/19 1545   BP: (!) 132/93   Pulse: 82   Temp: 98.4 °F (36.9 °C)       Objective:     Current  Outpatient Medications   Medication Sig Dispense Refill    buPROPion (WELLBUTRIN XL) 300 MG 24 hr tablet Take 1 tablet (300 mg total) by mouth once daily. 90 tablet 3    clonazePAM (KLONOPIN) 0.25 MG TbDL Take 1 tablet (0.25 mg total) by mouth 3 (three) times daily. 90 tablet 0    [START ON 4/13/2019] dextroamphetamine-amphetamine (ADDERALL) 20 mg tablet Take 1 tablet by mouth 2 (two) times daily. 60 tablet 0    [START ON 5/13/2019] dextroamphetamine-amphetamine (ADDERALL) 20 mg tablet Take 1 tablet by mouth 2 (two) times daily. 60 tablet 0    [START ON 6/13/2019] dextroamphetamine-amphetamine (ADDERALL) 20 mg tablet Take 1 tablet by mouth 2 (two) times daily. 60 tablet 0    multivit with min-folic acid (ADULT MULTIVITAMIN GUMMIES) 200 mcg Chew Take 1 tablet by mouth once daily.      SYNTHROID 112 mcg tablet Take 1 tablet (112 mcg total) by mouth once daily. 90 tablet 0     No current facility-administered medications for this visit.        Physical Exam   Constitutional: She is oriented to person, place, and time. She appears well-developed and well-nourished. No distress.   HENT:   Head: Normocephalic and atraumatic.   Eyes: EOM are normal. Pupils are equal, round, and reactive to light.   Neck: Normal range of motion. Neck supple.   Cardiovascular: Normal rate and regular rhythm.   Pulmonary/Chest: Effort normal and breath sounds normal.   Musculoskeletal: Normal range of motion.   Neurological: She is alert and oriented to person, place, and time.   Skin: Skin is warm and dry. No rash noted.   Psychiatric: She has a normal mood and affect. Judgment normal.   Nursing note and vitals reviewed.      Assessment:       1. Attention deficit disorder, unspecified hyperactivity presence    2. Hypothyroidism due to acquired atrophy of thyroid        Plan:   Attention deficit disorder, unspecified hyperactivity presence    Hypothyroidism due to acquired atrophy of thyroid    Other orders  -      dextroamphetamine-amphetamine (ADDERALL) 20 mg tablet; Take 1 tablet by mouth 2 (two) times daily.  Dispense: 60 tablet; Refill: 0  -     dextroamphetamine-amphetamine (ADDERALL) 20 mg tablet; Take 1 tablet by mouth 2 (two) times daily.  Dispense: 60 tablet; Refill: 0  -     dextroamphetamine-amphetamine (ADDERALL) 20 mg tablet; Take 1 tablet by mouth 2 (two) times daily.  Dispense: 60 tablet; Refill: 0  -     SYNTHROID 112 mcg tablet; Take 1 tablet (112 mcg total) by mouth once daily.  Dispense: 90 tablet; Refill: 0        No Follow-up on file.    There are no Patient Instructions on file for this visit.

## 2019-03-08 ENCOUNTER — PATIENT MESSAGE (OUTPATIENT)
Dept: FAMILY MEDICINE | Facility: CLINIC | Age: 34
End: 2019-03-08

## 2019-03-08 DIAGNOSIS — E03.9 HYPOTHYROIDISM, UNSPECIFIED TYPE: Primary | ICD-10-CM

## 2019-03-11 ENCOUNTER — PATIENT MESSAGE (OUTPATIENT)
Dept: FAMILY MEDICINE | Facility: CLINIC | Age: 34
End: 2019-03-11

## 2019-03-11 ENCOUNTER — TELEPHONE (OUTPATIENT)
Dept: FAMILY MEDICINE | Facility: CLINIC | Age: 34
End: 2019-03-11

## 2019-03-11 DIAGNOSIS — E03.9 HYPOTHYROIDISM, UNSPECIFIED TYPE: Primary | ICD-10-CM

## 2019-03-11 NOTE — TELEPHONE ENCOUNTER
Message from Dr Peng sent to patient through my chart, orders pended, waiting on patient response to schedule lab

## 2019-05-20 ENCOUNTER — PATIENT OUTREACH (OUTPATIENT)
Dept: ADMINISTRATIVE | Facility: HOSPITAL | Age: 34
End: 2019-05-20

## 2019-05-20 NOTE — PROGRESS NOTES
Health Maintenance reviewed. PAP and HPV dated 2/1/2017 performed By Dr. Noriega entered.  HM updated.

## 2019-06-03 ENCOUNTER — OFFICE VISIT (OUTPATIENT)
Dept: FAMILY MEDICINE | Facility: CLINIC | Age: 34
End: 2019-06-03
Payer: COMMERCIAL

## 2019-06-03 ENCOUNTER — LAB VISIT (OUTPATIENT)
Dept: LAB | Facility: HOSPITAL | Age: 34
End: 2019-06-03
Attending: FAMILY MEDICINE
Payer: COMMERCIAL

## 2019-06-03 VITALS
HEART RATE: 74 BPM | HEIGHT: 62 IN | WEIGHT: 151 LBS | SYSTOLIC BLOOD PRESSURE: 108 MMHG | BODY MASS INDEX: 27.79 KG/M2 | DIASTOLIC BLOOD PRESSURE: 66 MMHG | TEMPERATURE: 99 F

## 2019-06-03 DIAGNOSIS — E03.4 HYPOTHYROIDISM DUE TO ACQUIRED ATROPHY OF THYROID: ICD-10-CM

## 2019-06-03 DIAGNOSIS — Z00.00 ROUTINE CHECK-UP: ICD-10-CM

## 2019-06-03 DIAGNOSIS — F98.8 ATTENTION DEFICIT DISORDER, UNSPECIFIED HYPERACTIVITY PRESENCE: ICD-10-CM

## 2019-06-03 DIAGNOSIS — Z00.00 ROUTINE CHECK-UP: Primary | ICD-10-CM

## 2019-06-03 LAB
ALBUMIN SERPL BCP-MCNC: 4.3 G/DL (ref 3.5–5.2)
ALP SERPL-CCNC: 65 U/L (ref 55–135)
ALT SERPL W/O P-5'-P-CCNC: 23 U/L (ref 10–44)
ANION GAP SERPL CALC-SCNC: 8 MMOL/L (ref 8–16)
AST SERPL-CCNC: 29 U/L (ref 10–40)
BASOPHILS # BLD AUTO: 0.07 K/UL (ref 0–0.2)
BASOPHILS NFR BLD: 1.6 % (ref 0–1.9)
BILIRUB SERPL-MCNC: 1 MG/DL (ref 0.1–1)
BUN SERPL-MCNC: 9 MG/DL (ref 6–20)
CALCIUM SERPL-MCNC: 10 MG/DL (ref 8.7–10.5)
CHLORIDE SERPL-SCNC: 102 MMOL/L (ref 95–110)
CHOLEST SERPL-MCNC: 261 MG/DL (ref 120–199)
CHOLEST/HDLC SERPL: 2.9 {RATIO} (ref 2–5)
CO2 SERPL-SCNC: 27 MMOL/L (ref 23–29)
CREAT SERPL-MCNC: 0.9 MG/DL (ref 0.5–1.4)
DIFFERENTIAL METHOD: NORMAL
EOSINOPHIL # BLD AUTO: 0.3 K/UL (ref 0–0.5)
EOSINOPHIL NFR BLD: 5.7 % (ref 0–8)
ERYTHROCYTE [DISTWIDTH] IN BLOOD BY AUTOMATED COUNT: 12 % (ref 11.5–14.5)
EST. GFR  (AFRICAN AMERICAN): >60 ML/MIN/1.73 M^2
EST. GFR  (NON AFRICAN AMERICAN): >60 ML/MIN/1.73 M^2
GLUCOSE SERPL-MCNC: 85 MG/DL (ref 70–110)
HCT VFR BLD AUTO: 39.5 % (ref 37–48.5)
HDLC SERPL-MCNC: 89 MG/DL (ref 40–75)
HDLC SERPL: 34.1 % (ref 20–50)
HGB BLD-MCNC: 13.3 G/DL (ref 12–16)
IMM GRANULOCYTES # BLD AUTO: 0.02 K/UL (ref 0–0.04)
IMM GRANULOCYTES NFR BLD AUTO: 0.5 % (ref 0–0.5)
LDLC SERPL CALC-MCNC: 160.6 MG/DL (ref 63–159)
LYMPHOCYTES # BLD AUTO: 1.3 K/UL (ref 1–4.8)
LYMPHOCYTES NFR BLD: 30.1 % (ref 18–48)
MCH RBC QN AUTO: 30.4 PG (ref 27–31)
MCHC RBC AUTO-ENTMCNC: 33.7 G/DL (ref 32–36)
MCV RBC AUTO: 90 FL (ref 82–98)
MONOCYTES # BLD AUTO: 0.5 K/UL (ref 0.3–1)
MONOCYTES NFR BLD: 10.7 % (ref 4–15)
NEUTROPHILS # BLD AUTO: 2.3 K/UL (ref 1.8–7.7)
NEUTROPHILS NFR BLD: 51.4 % (ref 38–73)
NONHDLC SERPL-MCNC: 172 MG/DL
NRBC BLD-RTO: 0 /100 WBC
PLATELET # BLD AUTO: 259 K/UL (ref 150–350)
PMV BLD AUTO: 9.6 FL (ref 9.2–12.9)
POTASSIUM SERPL-SCNC: 4 MMOL/L (ref 3.5–5.1)
PROT SERPL-MCNC: 7.8 G/DL (ref 6–8.4)
RBC # BLD AUTO: 4.37 M/UL (ref 4–5.4)
SODIUM SERPL-SCNC: 137 MMOL/L (ref 136–145)
TRIGL SERPL-MCNC: 57 MG/DL (ref 30–150)
TSH SERPL DL<=0.005 MIU/L-ACNC: 0.92 UIU/ML (ref 0.4–4)
WBC # BLD AUTO: 4.39 K/UL (ref 3.9–12.7)

## 2019-06-03 PROCEDURE — 99999 PR PBB SHADOW E&M-EST. PATIENT-LVL III: ICD-10-PCS | Mod: PBBFAC,,, | Performed by: FAMILY MEDICINE

## 2019-06-03 PROCEDURE — 80053 COMPREHEN METABOLIC PANEL: CPT

## 2019-06-03 PROCEDURE — 99999 PR PBB SHADOW E&M-EST. PATIENT-LVL III: CPT | Mod: PBBFAC,,, | Performed by: FAMILY MEDICINE

## 2019-06-03 PROCEDURE — 84443 ASSAY THYROID STIM HORMONE: CPT

## 2019-06-03 PROCEDURE — 99395 PREV VISIT EST AGE 18-39: CPT | Mod: S$GLB,,, | Performed by: FAMILY MEDICINE

## 2019-06-03 PROCEDURE — 85025 COMPLETE CBC W/AUTO DIFF WBC: CPT

## 2019-06-03 PROCEDURE — 99395 PR PREVENTIVE VISIT,EST,18-39: ICD-10-PCS | Mod: S$GLB,,, | Performed by: FAMILY MEDICINE

## 2019-06-03 PROCEDURE — 36415 COLL VENOUS BLD VENIPUNCTURE: CPT | Mod: PO

## 2019-06-03 PROCEDURE — 80061 LIPID PANEL: CPT

## 2019-06-03 RX ORDER — DEXTROAMPHETAMINE SACCHARATE, AMPHETAMINE ASPARTATE, DEXTROAMPHETAMINE SULFATE AND AMPHETAMINE SULFATE 5; 5; 5; 5 MG/1; MG/1; MG/1; MG/1
1 TABLET ORAL 2 TIMES DAILY
Qty: 60 TABLET | Refills: 0 | Status: SHIPPED | OUTPATIENT
Start: 2019-07-03 | End: 2019-08-12 | Stop reason: SDUPTHER

## 2019-06-03 RX ORDER — LEVOTHYROXINE SODIUM 112 UG/1
112 TABLET ORAL DAILY
Qty: 90 TABLET | Refills: 4 | Status: SHIPPED | OUTPATIENT
Start: 2019-06-03 | End: 2019-11-20 | Stop reason: SDUPTHER

## 2019-06-03 RX ORDER — DEXTROAMPHETAMINE SACCHARATE, AMPHETAMINE ASPARTATE, DEXTROAMPHETAMINE SULFATE AND AMPHETAMINE SULFATE 5; 5; 5; 5 MG/1; MG/1; MG/1; MG/1
1 TABLET ORAL 2 TIMES DAILY
Qty: 60 TABLET | Refills: 0 | Status: SHIPPED | OUTPATIENT
Start: 2019-08-02 | End: 2019-08-12 | Stop reason: SDUPTHER

## 2019-06-03 RX ORDER — DEXTROAMPHETAMINE SACCHARATE, AMPHETAMINE ASPARTATE, DEXTROAMPHETAMINE SULFATE AND AMPHETAMINE SULFATE 5; 5; 5; 5 MG/1; MG/1; MG/1; MG/1
1 TABLET ORAL 2 TIMES DAILY
Qty: 60 TABLET | Refills: 0 | Status: SHIPPED | OUTPATIENT
Start: 2019-06-13 | End: 2019-08-12 | Stop reason: SDUPTHER

## 2019-06-03 NOTE — PROGRESS NOTES
The patient presents today for general health evaluation and counseling      Past Medical History:  Past Medical History:   Diagnosis Date    ADHD (attention deficit hyperactivity disorder)     Allergy     Anxiety     Depression     Thyroid disease      Past Surgical History:   Procedure Laterality Date     SECTION       Review of patient's allergies indicates:   Allergen Reactions    Pcn [penicillins]      Current Outpatient Medications on File Prior to Visit   Medication Sig Dispense Refill    buPROPion (WELLBUTRIN XL) 300 MG 24 hr tablet Take 1 tablet (300 mg total) by mouth once daily. 90 tablet 3    clonazePAM (KLONOPIN) 0.25 MG TbDL Take 1 tablet (0.25 mg total) by mouth 3 (three) times daily. 90 tablet 0    dextroamphetamine-amphetamine (ADDERALL) 20 mg tablet Take 1 tablet by mouth 2 (two) times daily. 60 tablet 0    dextroamphetamine-amphetamine (ADDERALL) 20 mg tablet Take 1 tablet by mouth 2 (two) times daily. 60 tablet 0    [START ON 2019] dextroamphetamine-amphetamine (ADDERALL) 20 mg tablet Take 1 tablet by mouth 2 (two) times daily. 60 tablet 0    multivit with min-folic acid (ADULT MULTIVITAMIN GUMMIES) 200 mcg Chew Take 1 tablet by mouth once daily.      SYNTHROID 112 mcg tablet Take 1 tablet (112 mcg total) by mouth once daily. 90 tablet 0     No current facility-administered medications on file prior to visit.      Social History     Socioeconomic History    Marital status:      Spouse name: Not on file    Number of children: Not on file    Years of education: Not on file    Highest education level: Not on file   Occupational History     Employer: OTHER   Social Needs    Financial resource strain: Not on file    Food insecurity:     Worry: Not on file     Inability: Not on file    Transportation needs:     Medical: Not on file     Non-medical: Not on file   Tobacco Use    Smoking status: Former Smoker     Types: Cigarettes     Last attempt to quit:  2008     Years since quittin.3    Smokeless tobacco: Never Used   Substance and Sexual Activity    Alcohol use: No    Drug use: No    Sexual activity: Yes     Partners: Female, Male   Lifestyle    Physical activity:     Days per week: Not on file     Minutes per session: Not on file    Stress: Not on file   Relationships    Social connections:     Talks on phone: Not on file     Gets together: Not on file     Attends [employer] service: Not on file     Active member of club or organization: Not on file     Attends meetings of clubs or organizations: Not on file     Relationship status: Not on file   Other Topics Concern    Not on file   Social History Narrative    Not on file     History reviewed. No pertinent family history.      ROS:GENERAL: No fever, chills, fatigability or weight loss.  SKIN: No rashes, itching or changes in color or texture of skin.  HEAD: No headaches or recent head trauma.EYES: Visual acuity fine. No photophobia, ocular pain or diplopia.EARS: Denies ear pain, discharge or vertigo.NOSE: No loss of smell, no epistaxis or postnasal drip.MOUTH & THROAT: No hoarseness or change in voice. No excessive gum bleeding.NODES: Denies swollen glands.  CHEST: Denies CLEMENT, cyanosis, wheezing, cough and sputum production.  CARDIOVASCULAR: Denies chest pain, PND, orthopnea or reduced exercise tolerance.  ABDOMEN: Appetite fine. No weight loss. Denies diarrhea, abdominal pain, hematemesis or blood in stool.  URINARY: No flank pain, dysuria or hematuria.  PERIPHERAL VASCULAR: No claudication or cyanosis.  MUSCULOSKELETAL: See above.  NEUROLOGIC: No history of seizures, paralysis, alteration of gait or coordination.  PE:   HEAD: Normocephalic, atraumatic.EYES: PERRL. EOMI.   EARS: TM's intact. Light reflex normal. No retraction or perforation.   NOSE: Mucosa pink. Airway clear.MOUTH & THROAT: No tonsillar enlargement. No pharyngeal erythema or exudate. No stridor.  NODES: No cervical, axillary or  inguinal lymph node enlargement.  CHEST: Lungs clear to auscultation.  CARDIOVASCULAR: Normal S1, S2. No rubs, murmurs or gallops.  ABDOMEN: Bowel sounds normal. Not distended. Soft. No tenderness or masses.  MUSCULOSKELETAL: No palpable abnormality  NEUROLOGIC: Cranial Nerves: II-XII grossly intact.  Motor: 5/5 strength major flexors/extensors.  DTR's: Knees, Ankles 2+ and equal bilaterally; downgoing toes.  Sensory: Intact to light touch distally.  Gait & Posture: Normal gait and fine motion. No cerebellar signs.     Impression:Routine health check  Plan:Lab eval  Rec diet and ex recs  Rev age appropriate screenings    Health Maintenance Due   Topic Date Due    TETANUS VACCINE  03/17/2018

## 2019-06-04 ENCOUNTER — PATIENT MESSAGE (OUTPATIENT)
Dept: FAMILY MEDICINE | Facility: CLINIC | Age: 34
End: 2019-06-04

## 2019-06-04 NOTE — TELEPHONE ENCOUNTER
The TSH level is the most sensitive and it has been consistent with previous T4 levels, unless the T4 and TSH are incongruent -a TSH alone should be adequate to follow her status. OK to order FMR1 DNA test but I can't find it in our database

## 2019-07-05 ENCOUNTER — OFFICE VISIT (OUTPATIENT)
Dept: ENDOCRINOLOGY | Facility: CLINIC | Age: 34
End: 2019-07-05
Payer: COMMERCIAL

## 2019-07-05 VITALS
DIASTOLIC BLOOD PRESSURE: 78 MMHG | HEIGHT: 62 IN | BODY MASS INDEX: 27.86 KG/M2 | WEIGHT: 151.38 LBS | SYSTOLIC BLOOD PRESSURE: 112 MMHG | HEART RATE: 79 BPM

## 2019-07-05 DIAGNOSIS — E03.9 HYPOTHYROIDISM, UNSPECIFIED TYPE: Primary | ICD-10-CM

## 2019-07-05 PROCEDURE — 99999 PR PBB SHADOW E&M-EST. PATIENT-LVL III: ICD-10-PCS | Mod: PBBFAC,,, | Performed by: INTERNAL MEDICINE

## 2019-07-05 PROCEDURE — 99213 OFFICE O/P EST LOW 20 MIN: CPT | Mod: S$GLB,,, | Performed by: INTERNAL MEDICINE

## 2019-07-05 PROCEDURE — 99213 PR OFFICE/OUTPT VISIT, EST, LEVL III, 20-29 MIN: ICD-10-PCS | Mod: S$GLB,,, | Performed by: INTERNAL MEDICINE

## 2019-07-05 PROCEDURE — 99999 PR PBB SHADOW E&M-EST. PATIENT-LVL III: CPT | Mod: PBBFAC,,, | Performed by: INTERNAL MEDICINE

## 2019-07-05 PROCEDURE — 3008F BODY MASS INDEX DOCD: CPT | Mod: CPTII,S$GLB,, | Performed by: INTERNAL MEDICINE

## 2019-07-05 PROCEDURE — 3008F PR BODY MASS INDEX (BMI) DOCUMENTED: ICD-10-PCS | Mod: CPTII,S$GLB,, | Performed by: INTERNAL MEDICINE

## 2019-07-12 ENCOUNTER — PATIENT MESSAGE (OUTPATIENT)
Dept: FAMILY MEDICINE | Facility: CLINIC | Age: 34
End: 2019-07-12

## 2019-07-22 ENCOUNTER — PATIENT MESSAGE (OUTPATIENT)
Dept: FAMILY MEDICINE | Facility: CLINIC | Age: 34
End: 2019-07-22

## 2019-07-24 NOTE — TELEPHONE ENCOUNTER
Pt is asking If the excuse can be changed due to the response from the jimi. Monika is asking if we can put to give her until 7/31 for the exam  As for why I waited, it's because [facility] wasn't available over the weekend, and I was still trying desperately to make things work, but when my son got kicked out of camp on Monday    Dear Monika,  I am sorry you are not feeling well. We have not received anything directly from Dr. Peng. For emergency medical issues that occur during exams, students are expected to contact me at or before the exam time, when possible, to alert me as to what is going on and why they are unable to sit for an exam. Both of your exams have been open since July 19, so I am concerned about the timing of your email today at 11:30 because, per the exam schedule, both of the exams would need to be completed today by 9:00 pm. You should have attempted at least one of these exams before today or reached out to me before today, in light of the fact that you have two of them. Did you open either of the exams yet? Were you also ill between 7/19 and 7/22 and you had not gone to the doctor yet?   As for your specific request, I ordinarily receive more detail than what is found in the letter you attached from Dr. Peng dated 7/23, so that I am able to evaluate the circumstances and reschedule the exam in a reasonable time after the student has recovered from the emergency medical condition. The letter you have provided is vague, and it dictates the time period for rescheduling the exam as not before two weeks. A two-week postponement means that you would need to receive incompletes for the courses because grades are due on or before August 2. Also, if we postpone the exams for two weeks, you would be taking the exams while you are supposed to be involved in the Divorce and Mediation course to be held August 5-9, which would not be advisable.   As I think through the circumstances, here are some  thoughts:  1) We can discuss your condition in more detail and you can discuss with your doctor, and we can determine if a make-up schedule that would allow you to complete your exams in enough time for the professors to grade them before the August 2 deadline for grades is possible. This would also allow you to take the Divorce and Mediation course.  2) Of course, if medically that is not advisable (I am not a medical doctor), we would need to create an exam schedule that accommodates the two-week delay. The problem with that is that schedule would put your exams the week of the Divorce and Mediation course.   3) If exams are not completed and graded before the August 2 deadline, you will receive incompletes in the courses. Letter grades would be added once exams are completed and graded.   Please let me know what you are thinking in terms of making the exams up and whether you believe you will be able to participate in the upcoming course the week of August 5. For the exams, we will need to set the day and time soon to ensure that the exams are open on the appropriate days. We will not leave them open for a 5-day period, but will open each one on a set day and time.   Sincerely,  Didier Lemons

## 2019-08-08 ENCOUNTER — PATIENT MESSAGE (OUTPATIENT)
Dept: FAMILY MEDICINE | Facility: CLINIC | Age: 34
End: 2019-08-08

## 2019-08-12 ENCOUNTER — OFFICE VISIT (OUTPATIENT)
Dept: FAMILY MEDICINE | Facility: CLINIC | Age: 34
End: 2019-08-12
Payer: COMMERCIAL

## 2019-08-12 VITALS
BODY MASS INDEX: 28.16 KG/M2 | HEIGHT: 62 IN | SYSTOLIC BLOOD PRESSURE: 111 MMHG | WEIGHT: 153 LBS | TEMPERATURE: 99 F | HEART RATE: 110 BPM | DIASTOLIC BLOOD PRESSURE: 78 MMHG

## 2019-08-12 DIAGNOSIS — F98.8 ATTENTION DEFICIT DISORDER, UNSPECIFIED HYPERACTIVITY PRESENCE: Primary | ICD-10-CM

## 2019-08-12 PROCEDURE — 3008F BODY MASS INDEX DOCD: CPT | Mod: CPTII,S$GLB,, | Performed by: FAMILY MEDICINE

## 2019-08-12 PROCEDURE — 99999 PR PBB SHADOW E&M-EST. PATIENT-LVL III: ICD-10-PCS | Mod: PBBFAC,,, | Performed by: FAMILY MEDICINE

## 2019-08-12 PROCEDURE — 99213 PR OFFICE/OUTPT VISIT, EST, LEVL III, 20-29 MIN: ICD-10-PCS | Mod: S$GLB,,, | Performed by: FAMILY MEDICINE

## 2019-08-12 PROCEDURE — 99213 OFFICE O/P EST LOW 20 MIN: CPT | Mod: S$GLB,,, | Performed by: FAMILY MEDICINE

## 2019-08-12 PROCEDURE — 3008F PR BODY MASS INDEX (BMI) DOCUMENTED: ICD-10-PCS | Mod: CPTII,S$GLB,, | Performed by: FAMILY MEDICINE

## 2019-08-12 PROCEDURE — 99999 PR PBB SHADOW E&M-EST. PATIENT-LVL III: CPT | Mod: PBBFAC,,, | Performed by: FAMILY MEDICINE

## 2019-08-12 RX ORDER — DEXTROAMPHETAMINE SACCHARATE, AMPHETAMINE ASPARTATE, DEXTROAMPHETAMINE SULFATE AND AMPHETAMINE SULFATE 5; 5; 5; 5 MG/1; MG/1; MG/1; MG/1
1 TABLET ORAL 2 TIMES DAILY
Qty: 60 TABLET | Refills: 0 | Status: SHIPPED | OUTPATIENT
Start: 2019-10-30 | End: 2019-12-10 | Stop reason: SDUPTHER

## 2019-08-12 RX ORDER — DEXTROAMPHETAMINE SACCHARATE, AMPHETAMINE ASPARTATE, DEXTROAMPHETAMINE SULFATE AND AMPHETAMINE SULFATE 5; 5; 5; 5 MG/1; MG/1; MG/1; MG/1
1 TABLET ORAL 2 TIMES DAILY
Qty: 60 TABLET | Refills: 0 | Status: SHIPPED | OUTPATIENT
Start: 2019-08-31 | End: 2019-12-16 | Stop reason: SDUPTHER

## 2019-08-12 RX ORDER — DEXTROAMPHETAMINE SACCHARATE, AMPHETAMINE ASPARTATE, DEXTROAMPHETAMINE SULFATE AND AMPHETAMINE SULFATE 5; 5; 5; 5 MG/1; MG/1; MG/1; MG/1
1 TABLET ORAL 2 TIMES DAILY
Qty: 60 TABLET | Refills: 0 | Status: SHIPPED | OUTPATIENT
Start: 2019-09-30 | End: 2019-12-16 | Stop reason: SDUPTHER

## 2019-08-12 NOTE — PROGRESS NOTES
The patient presents to fu CHARLIE and ADD gen stable w current regimen ROS:  General: No fever or sig wt change  HEENT:No other PND eye pain or dc  Respiratory: No cough wheezing  PE: vital signs noted  HEENT: Normocephalic,with no recent trauma,PERRLA,EOMI,conjunctiva injected with no exudate.Nasopharynx is injected and edematous.External otic canal edematous and injected TM dull  Neck:Supple without adenopathy  Chest:Clear bilateral breath sounds   Heart:Regular rhthym without murmer  Abdomen:Soft, non tender,no masses, no hepatosplenomegaly  Extremeties and Neurologic:Grossly within normal limits     Impression:CHARLIE ADD  Plan:  Cont current meds              reviewed no concerning finding

## 2019-08-30 ENCOUNTER — PATIENT MESSAGE (OUTPATIENT)
Dept: FAMILY MEDICINE | Facility: CLINIC | Age: 34
End: 2019-08-30

## 2019-08-30 NOTE — TELEPHONE ENCOUNTER
Tylenol/acetomenophen up to 1000mg 3x daily. Along with it can take adil/ibuprofen 800 mg 3 times daily OR aleve/naproxen 550 mg twice a day

## 2019-09-08 RX ORDER — BUPROPION HYDROCHLORIDE 300 MG/1
TABLET ORAL
Qty: 90 TABLET | Refills: 4 | Status: SHIPPED | OUTPATIENT
Start: 2019-09-08 | End: 2020-03-17 | Stop reason: SDUPTHER

## 2019-09-25 ENCOUNTER — PATIENT MESSAGE (OUTPATIENT)
Dept: FAMILY MEDICINE | Facility: CLINIC | Age: 34
End: 2019-09-25

## 2019-09-27 ENCOUNTER — PATIENT MESSAGE (OUTPATIENT)
Dept: FAMILY MEDICINE | Facility: CLINIC | Age: 34
End: 2019-09-27

## 2019-09-27 ENCOUNTER — OFFICE VISIT (OUTPATIENT)
Dept: FAMILY MEDICINE | Facility: CLINIC | Age: 34
End: 2019-09-27
Payer: COMMERCIAL

## 2019-09-27 DIAGNOSIS — J40 BRONCHITIS: ICD-10-CM

## 2019-09-27 DIAGNOSIS — R09.81 NASAL CONGESTION: Primary | ICD-10-CM

## 2019-09-27 PROCEDURE — 99213 OFFICE O/P EST LOW 20 MIN: CPT | Mod: 95,,, | Performed by: FAMILY MEDICINE

## 2019-09-27 PROCEDURE — 99213 PR OFFICE/OUTPT VISIT, EST, LEVL III, 20-29 MIN: ICD-10-PCS | Mod: 95,,, | Performed by: FAMILY MEDICINE

## 2019-09-27 RX ORDER — CODEINE PHOSPHATE AND GUAIFENESIN 10; 100 MG/5ML; MG/5ML
10 SOLUTION ORAL 4 TIMES DAILY PRN
Qty: 240 ML | Refills: 0 | Status: SHIPPED | OUTPATIENT
Start: 2019-09-27 | End: 2019-10-07

## 2019-09-27 RX ORDER — METHYLPREDNISOLONE 4 MG/1
TABLET ORAL
Qty: 21 TABLET | Refills: 0 | Status: SHIPPED | OUTPATIENT
Start: 2019-09-27 | End: 2019-12-16 | Stop reason: ALTCHOICE

## 2019-09-27 RX ORDER — CETIRIZINE HYDROCHLORIDE, PSEUDOEPHEDRINE HYDROCHLORIDE 5; 120 MG/1; MG/1
1 TABLET, FILM COATED, EXTENDED RELEASE ORAL 2 TIMES DAILY
Qty: 20 TABLET | Refills: 0 | Status: SHIPPED | OUTPATIENT
Start: 2019-09-27 | End: 2019-10-07

## 2019-11-20 ENCOUNTER — PATIENT MESSAGE (OUTPATIENT)
Dept: ENDOCRINOLOGY | Facility: CLINIC | Age: 34
End: 2019-11-20

## 2019-11-20 ENCOUNTER — PATIENT MESSAGE (OUTPATIENT)
Dept: FAMILY MEDICINE | Facility: CLINIC | Age: 34
End: 2019-11-20

## 2019-11-20 DIAGNOSIS — E03.9 HYPOTHYROIDISM, UNSPECIFIED TYPE: Primary | ICD-10-CM

## 2019-11-20 RX ORDER — LEVOTHYROXINE SODIUM 112 UG/1
112 TABLET ORAL DAILY
Qty: 90 TABLET | Refills: 4 | Status: SHIPPED | OUTPATIENT
Start: 2019-11-20 | End: 2020-06-16

## 2019-11-20 NOTE — TELEPHONE ENCOUNTER
OK. Resent Rx and removed dispense as written. She may need to tell them generic as well in case they do not realize the change    TSH 8 weeks

## 2019-11-21 ENCOUNTER — PATIENT MESSAGE (OUTPATIENT)
Dept: FAMILY MEDICINE | Facility: CLINIC | Age: 34
End: 2019-11-21

## 2019-12-05 ENCOUNTER — PATIENT MESSAGE (OUTPATIENT)
Dept: ENDOCRINOLOGY | Facility: CLINIC | Age: 34
End: 2019-12-05

## 2019-12-09 ENCOUNTER — TELEPHONE (OUTPATIENT)
Dept: ENDOCRINOLOGY | Facility: CLINIC | Age: 34
End: 2019-12-09

## 2019-12-09 ENCOUNTER — PATIENT MESSAGE (OUTPATIENT)
Dept: ENDOCRINOLOGY | Facility: CLINIC | Age: 34
End: 2019-12-09

## 2019-12-09 NOTE — TELEPHONE ENCOUNTER
"Received a fax from [employer] stating the following:     " Brand Synthroid 112mcg tablet, use as directed, is approved for 1 year through 1/1/2021."     Reference #: [id number]    "

## 2019-12-10 ENCOUNTER — PATIENT MESSAGE (OUTPATIENT)
Dept: FAMILY MEDICINE | Facility: CLINIC | Age: 34
End: 2019-12-10

## 2019-12-10 RX ORDER — DEXTROAMPHETAMINE SACCHARATE, AMPHETAMINE ASPARTATE, DEXTROAMPHETAMINE SULFATE AND AMPHETAMINE SULFATE 5; 5; 5; 5 MG/1; MG/1; MG/1; MG/1
1 TABLET ORAL 2 TIMES DAILY
Qty: 60 TABLET | Refills: 0 | Status: SHIPPED | OUTPATIENT
Start: 2019-12-10 | End: 2019-12-16 | Stop reason: SDUPTHER

## 2019-12-16 ENCOUNTER — OFFICE VISIT (OUTPATIENT)
Dept: FAMILY MEDICINE | Facility: CLINIC | Age: 34
End: 2019-12-16
Payer: COMMERCIAL

## 2019-12-16 VITALS
TEMPERATURE: 98 F | HEIGHT: 62 IN | BODY MASS INDEX: 28.89 KG/M2 | SYSTOLIC BLOOD PRESSURE: 116 MMHG | WEIGHT: 157 LBS | DIASTOLIC BLOOD PRESSURE: 77 MMHG | HEART RATE: 81 BPM

## 2019-12-16 DIAGNOSIS — F98.8 ATTENTION DEFICIT DISORDER, UNSPECIFIED HYPERACTIVITY PRESENCE: ICD-10-CM

## 2019-12-16 DIAGNOSIS — J20.9 ACUTE BRONCHITIS, UNSPECIFIED ORGANISM: ICD-10-CM

## 2019-12-16 DIAGNOSIS — E03.9 HYPOTHYROIDISM, UNSPECIFIED TYPE: Primary | ICD-10-CM

## 2019-12-16 DIAGNOSIS — F41.8 DEPRESSION WITH ANXIETY: ICD-10-CM

## 2019-12-16 PROCEDURE — 99999 PR PBB SHADOW E&M-EST. PATIENT-LVL III: CPT | Mod: PBBFAC,,, | Performed by: INTERNAL MEDICINE

## 2019-12-16 PROCEDURE — 3008F PR BODY MASS INDEX (BMI) DOCUMENTED: ICD-10-PCS | Mod: CPTII,S$GLB,, | Performed by: INTERNAL MEDICINE

## 2019-12-16 PROCEDURE — 99999 PR PBB SHADOW E&M-EST. PATIENT-LVL III: ICD-10-PCS | Mod: PBBFAC,,, | Performed by: INTERNAL MEDICINE

## 2019-12-16 PROCEDURE — 99214 OFFICE O/P EST MOD 30 MIN: CPT | Mod: S$GLB,,, | Performed by: INTERNAL MEDICINE

## 2019-12-16 PROCEDURE — 3008F BODY MASS INDEX DOCD: CPT | Mod: CPTII,S$GLB,, | Performed by: INTERNAL MEDICINE

## 2019-12-16 PROCEDURE — 99214 PR OFFICE/OUTPT VISIT, EST, LEVL IV, 30-39 MIN: ICD-10-PCS | Mod: S$GLB,,, | Performed by: INTERNAL MEDICINE

## 2019-12-16 RX ORDER — ALPRAZOLAM 0.5 MG/1
0.5 TABLET ORAL
Qty: 20 TABLET | Refills: 2 | Status: SHIPPED | OUTPATIENT
Start: 2019-12-16 | End: 2020-03-17 | Stop reason: SDUPTHER

## 2019-12-16 RX ORDER — DEXTROAMPHETAMINE SACCHARATE, AMPHETAMINE ASPARTATE, DEXTROAMPHETAMINE SULFATE AND AMPHETAMINE SULFATE 5; 5; 5; 5 MG/1; MG/1; MG/1; MG/1
1 TABLET ORAL 2 TIMES DAILY
Qty: 60 TABLET | Refills: 0 | Status: SHIPPED | OUTPATIENT
Start: 2020-03-12 | End: 2020-03-17 | Stop reason: SDUPTHER

## 2019-12-16 RX ORDER — DEXTROAMPHETAMINE SACCHARATE, AMPHETAMINE ASPARTATE, DEXTROAMPHETAMINE SULFATE AND AMPHETAMINE SULFATE 5; 5; 5; 5 MG/1; MG/1; MG/1; MG/1
1 TABLET ORAL 2 TIMES DAILY
Qty: 60 TABLET | Refills: 0 | Status: SHIPPED | OUTPATIENT
Start: 2020-02-10 | End: 2020-03-11

## 2019-12-16 RX ORDER — DEXTROAMPHETAMINE SACCHARATE, AMPHETAMINE ASPARTATE, DEXTROAMPHETAMINE SULFATE AND AMPHETAMINE SULFATE 5; 5; 5; 5 MG/1; MG/1; MG/1; MG/1
1 TABLET ORAL 2 TIMES DAILY
Qty: 60 TABLET | Refills: 0 | Status: SHIPPED | OUTPATIENT
Start: 2020-01-10 | End: 2020-02-09

## 2019-12-16 RX ORDER — ALPRAZOLAM 0.5 MG/1
1 TABLET ORAL
Refills: 2 | COMMUNITY
Start: 2019-09-22 | End: 2019-12-16 | Stop reason: SDUPTHER

## 2019-12-16 NOTE — PATIENT INSTRUCTIONS
Acute Bronchitis  Your healthcare provider has told you that you have acute bronchitis. Bronchitis is infection or inflammation of the bronchial tubes (airways in the lungs). Normally, air moves easily in and out of the airways. Bronchitis narrows the airways, making it harder for air to flow in and out of the lungs. This causes symptoms such as shortness of breath, coughing up yellow or green mucus, and wheezing. Bronchitis can be acute or chronic. Acute means the condition comes on quickly and goes away in a short time, usually within 3 to 10 days. Chronic means a condition lasts a long time and often comes back.    What causes acute bronchitis?  Acute bronchitis almost always starts as a viral respiratory infection, such as a cold or the flu. Certain factors make it more likely for a cold or flu to turn into bronchitis. These include being very young, being elderly, having a heart or lung problem, or having a weak immune system. Cigarette smoking also makes bronchitis more likely.  When bronchitis develops, the airways become swollen. The airways may also become infected with bacteria. This is known as a secondary infection.  Diagnosing acute bronchitis  Your healthcare provider will examine you and ask about your symptoms and health history. You may also have a sputum culture to test the fluid in your lungs. Chest X-rays may be done to look for infection in the lungs.  Treating acute bronchitis  Bronchitis usually clears up as the cold or flu goes away. You can help feel better faster by doing the following:  · Take medicine as directed. You may be told to take ibuprofen or other over-the-counter medicines. These help relieve inflammation in your bronchial tubes. Your healthcare provider may prescribe an inhaler to help open up the bronchial tubes. Most of the time, acute bronchitis is caused by a viral infection. Antibiotics are usually not prescribed for viral infections.  · Drink plenty of fluids, such as  water, juice, or warm soup. Fluids loosen mucus so that you can cough it up. This helps you breathe more easily. Fluids also prevent dehydration.  · Make sure you get plenty of rest.  · Do not smoke. Do not allow anyone else to smoke in your home.  Recovery and follow-up  Follow up with your doctor as you are told. You will likely feel better in a week or two. But a dry cough can linger beyond that time. Let your doctor know if you still have symptoms (other than a dry cough) after 2 weeks, or if youre prone to getting bronchial infections. Take steps to protect yourself from future infections. These steps include stopping smoking and avoiding tobacco smoke, washing your hands often, and getting a yearly flu shot.  When to call your healthcare provider  Call the healthcare provider if you have any of the following:  · Fever of 100.4°F (38.0°C) or higher, or as advised  · Symptoms that get worse, or new symptoms  · Trouble breathing  · Symptoms that dont start to improve within a week, or within 3 days of taking antibiotics   Date Last Reviewed: 12/1/2016  © 7694-2190 [employer]. [address] [place] [address], [place] [address]. All rights reserved. This information is not intended as a substitute for professional medical care. Always follow your healthcare professional's instructions.

## 2019-12-16 NOTE — PROGRESS NOTES
Assessment/Plan:    Hypothyroidism  Lab Results   Component Value Date    TSH 0.921 06/03/2019   Followed by Dr. Pool Jackson. On Synthroid 112mcg.     Depression with anxiety  Followed by Dr. Lopez with Psychiatry.  Also has associated pain contract.  Doing well on Wellbutrin daily.  Also uses Xanax for panic attacks.  Also medication originally prescribed by Psychiatry, who she intermittently follows with.  Will provide refills for Xanax today.   has been reviewed.    Attention deficit disorder  Diagnosis Psychiatry.  Continues to follow periodically.  Medication management by PCP.  Doing well on current medications with no adverse effects.  Will provide with refills today and follow up in 3 months.   has been reviewed.    Acute bronchitis  Complaint of cough for the last several days.  Son recently sick.  Denies fevers or shortness of breath.  Continue symptom management.  Return if no improvement of symptoms.    _____________________________________________________________________________________________________________________________________________________    Orders this visit:    Hypothyroidism, unspecified type    Attention deficit disorder, unspecified hyperactivity presence  -     dextroamphetamine-amphetamine (ADDERALL) 20 mg tablet; Take 1 tablet by mouth 2 (two) times daily.  Dispense: 60 tablet; Refill: 0  -     dextroamphetamine-amphetamine (ADDERALL) 20 mg tablet; Take 1 tablet by mouth 2 (two) times daily.  Dispense: 60 tablet; Refill: 0  -     dextroamphetamine-amphetamine (ADDERALL) 20 mg tablet; Take 1 tablet by mouth 2 (two) times daily.  Dispense: 60 tablet; Refill: 0    Depression with anxiety  -     ALPRAZolam (XANAX) 0.5 MG tablet; Take 1 tablet (0.5 mg total) by mouth as needed.  Dispense: 20 tablet; Refill: 2    Acute bronchitis, unspecified organism      Follow up in about 3 months (around 3/16/2020).    Kika Casarez,  MD  _____________________________________________________________________________________________________________________________________________________    HPI:    Patient is in clinic today as an established patient, new to me, here to establish care.    Patient reports cough for the past 5 days or so. Patient reports that she overall just doesn't feel well. Denies fever, nasal congestion, or sinus issues. Her son was sick recently.    Past Medical History:  Past Medical History:   Diagnosis Date    ADHD (attention deficit hyperactivity disorder)     Allergy     Anxiety     Depression     Thyroid disease      Past Surgical History:   Procedure Laterality Date     SECTION       Review of patient's allergies indicates:   Allergen Reactions    Pcn [penicillins]      Social History     Tobacco Use    Smoking status: Former Smoker     Types: Cigarettes     Last attempt to quit: 2008     Years since quittin.8    Smokeless tobacco: Never Used   Substance Use Topics    Alcohol use: No    Drug use: No     History reviewed. No pertinent family history.  Current Outpatient Medications on File Prior to Visit   Medication Sig Dispense Refill    buPROPion (WELLBUTRIN XL) 300 MG 24 hr tablet TAKE 1 TABLET(300 MG) BY MOUTH EVERY DAY 90 tablet 4    levothyroxine (SYNTHROID) 112 MCG tablet Take 1 tablet (112 mcg total) by mouth once daily. 90 tablet 4    multivit with min-folic acid (ADULT MULTIVITAMIN GUMMIES) 200 mcg Chew Take 1 tablet by mouth once daily.      [DISCONTINUED] ALPRAZolam (XANAX) 0.5 MG tablet Take 1 tablet by mouth as needed.  2    [DISCONTINUED] dextroamphetamine-amphetamine (ADDERALL) 20 mg tablet Take 1 tablet by mouth 2 (two) times daily. 60 tablet 0    [DISCONTINUED] dextroamphetamine-amphetamine (ADDERALL) 20 mg tablet Take 1 tablet by mouth 2 (two) times daily. 60 tablet 0    [DISCONTINUED] dextroamphetamine-amphetamine (ADDERALL) 20 mg tablet Take 1 tablet by mouth 2  "(two) times daily. 60 tablet 0    [DISCONTINUED] clonazePAM (KLONOPIN) 0.25 MG TbDL Take 1 tablet (0.25 mg total) by mouth 3 (three) times daily. (Patient not taking: Reported on 12/16/2019) 90 tablet 0    [DISCONTINUED] methylPREDNISolone (MEDROL DOSEPACK) 4 mg tablet follow package directions (Patient not taking: Reported on 12/16/2019) 21 tablet 0     No current facility-administered medications on file prior to visit.        Review of Systems   Constitutional: Negative for activity change and unexpected weight change.   HENT: Positive for rhinorrhea. Negative for hearing loss and trouble swallowing.    Eyes: Negative for discharge and visual disturbance.   Respiratory: Positive for cough, chest tightness and wheezing. Negative for shortness of breath.    Cardiovascular: Negative for chest pain and palpitations.   Gastrointestinal: Negative for blood in stool, constipation, diarrhea and vomiting.   Endocrine: Negative for polydipsia and polyuria.   Genitourinary: Negative for difficulty urinating, dysuria, hematuria and menstrual problem.   Musculoskeletal: Negative for arthralgias, joint swelling and neck pain.   Neurological: Negative for weakness and headaches.   Psychiatric/Behavioral: Negative for confusion and dysphoric mood.       Vitals:    12/16/19 1051   BP: 116/77   Pulse: 81   Temp: 98.1 °F (36.7 °C)   Weight: 71.2 kg (157 lb)   Height: 5' 2" (1.575 m)       Wt Readings from Last 3 Encounters:   12/16/19 71.2 kg (157 lb)   08/12/19 69.4 kg (153 lb)   07/05/19 68.6 kg (151 lb 5.5 oz)       Physical Exam   Constitutional: She is oriented to person, place, and time. She appears well-developed and well-nourished. No distress.   HENT:   Head: Normocephalic and atraumatic.   Nose: Nose normal.   Mouth/Throat: Oropharynx is clear and moist. No oropharyngeal exudate.   Eyes: Conjunctivae and EOM are normal.   Neck: Normal range of motion. Neck supple.   Cardiovascular: Normal rate, regular rhythm, normal " heart sounds and intact distal pulses.   No murmur heard.  Pulmonary/Chest: Effort normal and breath sounds normal. No respiratory distress.   Abdominal: Soft. Bowel sounds are normal. She exhibits no distension. There is no tenderness.   Musculoskeletal: Normal range of motion.   Neurological: She is alert and oriented to person, place, and time.   Skin: Skin is warm and dry. No rash noted.   Psychiatric: She has a normal mood and affect.

## 2019-12-16 NOTE — ASSESSMENT & PLAN NOTE
Lab Results   Component Value Date    TSH 0.921 06/03/2019   Followed by Dr. Pool Jackson. On Synthroid 112mcg.

## 2019-12-16 NOTE — ASSESSMENT & PLAN NOTE
Followed by Dr. Lopez with Psychiatry.  Also has associated pain contract.  Doing well on Wellbutrin daily.  Also uses Xanax for panic attacks.  Also medication originally prescribed by Psychiatry, who she intermittently follows with.  Will provide refills for Xanax today.   has been reviewed.

## 2019-12-16 NOTE — ASSESSMENT & PLAN NOTE
Complaint of cough for the last several days.  Son recently sick.  Denies fevers or shortness of breath.  Continue symptom management.  Return if no improvement of symptoms.

## 2019-12-16 NOTE — ASSESSMENT & PLAN NOTE
Diagnosis Psychiatry.  Continues to follow periodically.  Medication management by PCP.  Doing well on current medications with no adverse effects.  Will provide with refills today and follow up in 3 months.   has been reviewed.

## 2019-12-19 ENCOUNTER — TELEPHONE (OUTPATIENT)
Dept: NEUROLOGY | Facility: CLINIC | Age: 34
End: 2019-12-19

## 2019-12-19 NOTE — TELEPHONE ENCOUNTER
----- Message from Fabiana Gant sent at 12/19/2019  3:27 PM CST -----  Contact: Reyna  Type:  Pharmacy Calling to Clarify an RX    Name of Caller:Cyndi  Pharmacy Name:[facility]  Prescription Name:ALPRAZolam (XANAX) 0.5 MG tablet  What do they need to clarify?:needing the frequency on prescription  Best Call Back Number:[phone]  Additional Information:

## 2019-12-20 NOTE — TELEPHONE ENCOUNTER
Take one tablet daily as needed.     Please call pharmacy and let them know. If I need to write a new Rx, please let me know

## 2020-03-14 ENCOUNTER — PATIENT MESSAGE (OUTPATIENT)
Dept: FAMILY MEDICINE | Facility: CLINIC | Age: 35
End: 2020-03-14

## 2020-03-17 ENCOUNTER — PATIENT MESSAGE (OUTPATIENT)
Dept: FAMILY MEDICINE | Facility: CLINIC | Age: 35
End: 2020-03-17

## 2020-03-17 ENCOUNTER — OFFICE VISIT (OUTPATIENT)
Dept: FAMILY MEDICINE | Facility: CLINIC | Age: 35
End: 2020-03-17
Payer: COMMERCIAL

## 2020-03-17 DIAGNOSIS — F98.8 ATTENTION DEFICIT DISORDER, UNSPECIFIED HYPERACTIVITY PRESENCE: Primary | ICD-10-CM

## 2020-03-17 DIAGNOSIS — F41.8 DEPRESSION WITH ANXIETY: ICD-10-CM

## 2020-03-17 DIAGNOSIS — F98.8 ATTENTION DEFICIT DISORDER, UNSPECIFIED HYPERACTIVITY PRESENCE: ICD-10-CM

## 2020-03-17 PROBLEM — J20.9 ACUTE BRONCHITIS: Status: RESOLVED | Noted: 2019-12-16 | Resolved: 2020-03-17

## 2020-03-17 PROCEDURE — 99213 PR OFFICE/OUTPT VISIT, EST, LEVL III, 20-29 MIN: ICD-10-PCS | Mod: 95,,, | Performed by: INTERNAL MEDICINE

## 2020-03-17 PROCEDURE — 99213 OFFICE O/P EST LOW 20 MIN: CPT | Mod: 95,,, | Performed by: INTERNAL MEDICINE

## 2020-03-17 RX ORDER — ALPRAZOLAM 0.5 MG/1
0.5 TABLET ORAL
Qty: 20 TABLET | Refills: 2 | Status: SHIPPED | OUTPATIENT
Start: 2020-03-17 | End: 2020-09-14

## 2020-03-17 RX ORDER — BUPROPION HYDROCHLORIDE 300 MG/1
TABLET ORAL
Qty: 90 TABLET | Refills: 1 | Status: SHIPPED | OUTPATIENT
Start: 2020-03-17 | End: 2020-09-14

## 2020-03-17 RX ORDER — DEXTROAMPHETAMINE SACCHARATE, AMPHETAMINE ASPARTATE, DEXTROAMPHETAMINE SULFATE AND AMPHETAMINE SULFATE 5; 5; 5; 5 MG/1; MG/1; MG/1; MG/1
1 TABLET ORAL 2 TIMES DAILY
Qty: 60 TABLET | Refills: 0 | Status: SHIPPED | OUTPATIENT
Start: 2020-04-21 | End: 2020-03-17 | Stop reason: SDUPTHER

## 2020-03-17 RX ORDER — DEXTROAMPHETAMINE SACCHARATE, AMPHETAMINE ASPARTATE, DEXTROAMPHETAMINE SULFATE AND AMPHETAMINE SULFATE 5; 5; 5; 5 MG/1; MG/1; MG/1; MG/1
1 TABLET ORAL 2 TIMES DAILY
Qty: 60 TABLET | Refills: 0 | Status: SHIPPED | OUTPATIENT
Start: 2020-03-21 | End: 2020-03-17 | Stop reason: SDUPTHER

## 2020-03-17 RX ORDER — DEXTROAMPHETAMINE SACCHARATE, AMPHETAMINE ASPARTATE, DEXTROAMPHETAMINE SULFATE AND AMPHETAMINE SULFATE 5; 5; 5; 5 MG/1; MG/1; MG/1; MG/1
1 TABLET ORAL 2 TIMES DAILY
Qty: 60 TABLET | Refills: 0 | Status: SHIPPED | OUTPATIENT
Start: 2020-05-21 | End: 2020-09-14

## 2020-03-17 NOTE — PROGRESS NOTES
Primary Care Telemedicine Note  The patient location is:  Patient Home   The chief complaint leading to consultation is: Attention Deficit Disorder  Total time spent with patient: 10 minutes    Visit type: Virtual visit with synchronous audio only and video  Each patient to whom he or she provides medical services by telemedicine is:  (1) informed of the relationship between the physician and patient and the respective role of any other health care provider with respect to management of the patient; and (2) notified that he or she may decline to receive medical services by telemedicine and may withdraw from such care at any time.    CC:Attention Deficit Disorder  HPI:  Problem List Items Addressed This Visit        Psychiatric    Attention deficit disorder    Overview     The patient is being seen via telehealth today to discuss the chronic use of attention deficit medications.  The patient is enrolled in the Telemedicine ADD Program and receives 3/4 refills via Telehealth with 1/4 being in person.  This visit is 1/4 in that rotation.  The patient has been on the medicine for over the past year.  The patient has been compliant on the medicine and is not receiving narcotics from any other physician.  The  for the State of [place] has been viewed.  The patient denies any complications from taking the medicine.  The patient's weight and apatite has been stable and has not had difficulties with agitation.  She does reports that lately she feels as though medication is not as effective as before. Patient has hx of depression/anxiety as well and was previously followed by psychiatry. 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 We discussed continuing her current dose for now and then following up with psychiatry if she feels that the dose should be increased.            Relevant Medications    dextroamphetamine-amphetamine (ADDERALL) 20 mg  tablet (Start on 5/21/2020)    Depression with anxiety    Overview     -Previously followed by Dr. Lopez with Psychiatry.  Plans to follow up with new psychiatrist once semester of school is finished.   -Doing well on Wellbutrin daily.  Also uses Xanax for panic attacks.   -Does reports some increased anxiety recently due to ongoing coronavirus outbreak  -Denies severe anxiety. Denies depressive symptoms  -Denies SI/HI  -Will provide refills today.   -The patient was checked in the [facility] Board of Pharmacy's  Prescription Monitoring Program. There appears to be no incongruities with the patient's verbalized history.               Relevant Medications    ALPRAZolam (XANAX) 0.5 MG tablet    buPROPion (WELLBUTRIN XL) 300 MG 24 hr tablet          The patient's Health Maintenance was reviewed and the following appears to be due at this time:  Health Maintenance Due   Topic Date Due    TETANUS VACCINE  03/17/2018       [unfilled]  Outpatient Medications Prior to Visit   Medication Sig Dispense Refill    levothyroxine (SYNTHROID) 112 MCG tablet Take 1 tablet (112 mcg total) by mouth once daily. 90 tablet 4    multivit with min-folic acid (ADULT MULTIVITAMIN GUMMIES) 200 mcg Chew Take 1 tablet by mouth once daily.      ALPRAZolam (XANAX) 0.5 MG tablet Take 1 tablet (0.5 mg total) by mouth as needed. 20 tablet 2    buPROPion (WELLBUTRIN XL) 300 MG 24 hr tablet TAKE 1 TABLET(300 MG) BY MOUTH EVERY DAY 90 tablet 4    dextroamphetamine-amphetamine (ADDERALL) 20 mg tablet Take 1 tablet by mouth 2 (two) times daily. 60 tablet 0     No facility-administered medications prior to visit.         Physical Exam   Constitutional: She is oriented to person, place, and time.   HENT:   Head: Normocephalic and atraumatic.   Eyes: EOM are normal.   Pulmonary/Chest: Effort normal. No respiratory distress.   Neurological: She is alert and oriented to person, place, and time.   Constitutional: The patient appears  well-developed and well-nourished. No distress.   Psychiatric: The mood appears not anxious and the affect is not angry, not blunt, not labile and not inappropriate. Speech is not rapid and/or pressured, not delayed, not tangential and not slurred. The patient is not agitated, not aggressive, is not hyperactive, not slowed, not withdrawn, not actively hallucinating and not combative. Thought content is not paranoid and not delusional. Cognition and memory are not impaired. The patient does not express impulsivity or inappropriate judgment and does not exhibit a depressed mood. The patient expresses no homicidal and no suicidal ideation and has no suicidal plans and no homicidal plans. The patient is communicative and exhibits a normal recent memory and normal remote memory. The patient is attentive.     Encounter Diagnoses   Name Primary?    Attention deficit disorder, unspecified hyperactivity presence     Depression with anxiety        PLAN:    I am having Monika Connell maintain her multivit with min-folic acid, levothyroxine, ALPRAZolam, buPROPion, and dextroamphetamine-amphetamine.    Diagnoses and all orders for this visit:    Attention deficit disorder, unspecified hyperactivity presence  -     Discontinue: dextroamphetamine-amphetamine (ADDERALL) 20 mg tablet; Take 1 tablet by mouth 2 (two) times daily.  -     Discontinue: dextroamphetamine-amphetamine (ADDERALL) 20 mg tablet; Take 1 tablet by mouth 2 (two) times daily.  -     dextroamphetamine-amphetamine (ADDERALL) 20 mg tablet; Take 1 tablet by mouth 2 (two) times daily.    Depression with anxiety  -     ALPRAZolam (XANAX) 0.5 MG tablet; Take 1 tablet (0.5 mg total) by mouth as needed.  -     buPROPion (WELLBUTRIN XL) 300 MG 24 hr tablet; TAKE 1 TABLET(300 MG) BY MOUTH EVERY DAY        Medications Ordered This Encounter   Medications    ALPRAZolam (XANAX) 0.5 MG tablet     Sig: Take 1 tablet (0.5 mg total) by mouth as needed.     Dispense:  20 tablet      Refill:  2    buPROPion (WELLBUTRIN XL) 300 MG 24 hr tablet     Sig: TAKE 1 TABLET(300 MG) BY MOUTH EVERY DAY     Dispense:  90 tablet     Refill:  1    dextroamphetamine-amphetamine (ADDERALL) 20 mg tablet     Sig: Take 1 tablet by mouth 2 (two) times daily.     Dispense:  60 tablet     Refill:  0     Medications Ordered This Encounter   Medications    ALPRAZolam (XANAX) 0.5 MG tablet     Sig: Take 1 tablet (0.5 mg total) by mouth as needed.     Dispense:  20 tablet     Refill:  2    buPROPion (WELLBUTRIN XL) 300 MG 24 hr tablet     Sig: TAKE 1 TABLET(300 MG) BY MOUTH EVERY DAY     Dispense:  90 tablet     Refill:  1    dextroamphetamine-amphetamine (ADDERALL) 20 mg tablet     Sig: Take 1 tablet by mouth 2 (two) times daily.     Dispense:  60 tablet     Refill:  0     No orders of the defined types were placed in this encounter.

## 2020-03-17 NOTE — ASSESSMENT & PLAN NOTE
The patient is being seen via telehealth today to discuss the chronic use of attention deficit medications.  The patient is enrolled in the Telemedicine ADD Program and receives 3/4 refills via Telehealth with 1/4 being in person.  This visit is 1/4 in that rotation.  The patient has been on the medicine for over the past year.  The patient has been compliant on the medicine and is not receiving narcotics from any other physician.  The  for the State of [place] has been viewed.  The patient denies any complications from taking the medicine.  The patient's weight and apatite has been stable and has not had difficulties with agitation.  She does reports that lately she feels as though medication is not as effective as before. Patient has hx of depression/anxiety as well and was previously followed by psychiatry. 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 We discussed continuing her current dose for now and then following up with psychiatry if she feels that the dose should be increased.

## 2020-03-17 NOTE — ASSESSMENT & PLAN NOTE
-Doing well on Wellbutrin daily.  Also uses Xanax for panic attacks.   -Does reports some increased anxiety recently due to ongoing coronavirus outbreak  -Denies severe anxiety. Denies depressive symptoms  -Denies SI/HI  -Will provide refills today.   -The patient was checked in the [facility] Board of Pharmacy's  Prescription Monitoring Program. There appears to be no incongruities with the patient's verbalized history.

## 2020-03-17 NOTE — TELEPHONE ENCOUNTER
Referral placed. Please provide number to schedule appt    I have signed for the following orders AND/OR meds.  Please call the patient and ask the patient to schedule the testing AND/OR inform about any medications that were sent. Medications have been sent to pharmacy listed below      Orders Placed This Encounter   Procedures    Ambulatory referral/consult to Psychiatry     Standing Status:   Future     Standing Expiration Date:   4/17/2021     Referral Priority:   Routine     Referral Type:   Psychiatric     Referral Reason:   Specialty Services Required     Requested Specialty:   Psychiatry     Number of Visits Requested:   1              [id number] - [place] - [id number] AT [facility] [id number]  [place] [address]  Phone: [phone] Fax: [phone]

## 2020-05-12 ENCOUNTER — PATIENT MESSAGE (OUTPATIENT)
Dept: ENDOCRINOLOGY | Facility: CLINIC | Age: 35
End: 2020-05-12

## 2020-05-12 DIAGNOSIS — E03.9 HYPOTHYROIDISM, UNSPECIFIED TYPE: Primary | ICD-10-CM

## 2020-05-12 NOTE — TELEPHONE ENCOUNTER
Have her check a TSH.   Ideally need TSH < 2.5  Can set up a virtual appt to discuss thyroid and pregnancy

## 2020-05-13 ENCOUNTER — PATIENT MESSAGE (OUTPATIENT)
Dept: ENDOCRINOLOGY | Facility: CLINIC | Age: 35
End: 2020-05-13

## 2020-05-13 ENCOUNTER — PATIENT OUTREACH (OUTPATIENT)
Dept: ADMINISTRATIVE | Facility: OTHER | Age: 35
End: 2020-05-13

## 2020-05-14 ENCOUNTER — OFFICE VISIT (OUTPATIENT)
Dept: ENDOCRINOLOGY | Facility: CLINIC | Age: 35
End: 2020-05-14
Payer: COMMERCIAL

## 2020-05-14 DIAGNOSIS — E03.9 HYPOTHYROIDISM, UNSPECIFIED TYPE: Primary | ICD-10-CM

## 2020-05-14 PROCEDURE — 99213 OFFICE O/P EST LOW 20 MIN: CPT | Mod: 95,,, | Performed by: INTERNAL MEDICINE

## 2020-05-14 PROCEDURE — 99213 PR OFFICE/OUTPT VISIT, EST, LEVL III, 20-29 MIN: ICD-10-PCS | Mod: 95,,, | Performed by: INTERNAL MEDICINE

## 2020-05-14 NOTE — PROGRESS NOTES
The patient location is: [place]  Visit type: audiovisual  Total time spent with patient: 16 min  Each patient to whom he or she provides medical services by telemedicine is:  (1) informed of the relationship between the physician and patient and the respective role of any other health care provider with respect to management of the patient; and (2) notified that he or she may decline to receive medical services by telemedicine and may withdraw from such care at any time.     CHIEF COMPLAINT: Hypothyroidism  34-year-old female here for followup. Currently on Synthroid 112 mcg. On brandname. Just completed law school. Trying to have another children.         PAST MEDICAL HISTORY: Hypothyroid. PCOS.     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         ROS:   Constitutional: weight stable  Eyes: No recent visual changes  ENT: No dysphagia  Cardiovascular: Denies current anginal symptoms  Respiratory: Denies current respiratory difficulty  Gastrointestinal: Denies recent bowel disturbances  GenitoUrinary - No dysuria  Skin: No new skin rash  Neurologic: No focal neurologic complaints     PE: virtual visit      A/P  1. Hypothyroidism- TPO+. Discussed thyroid and pregnancy. Goal TSH < 2.5.  Discussed once pregnant to let us know immediately so we can check TSH and possibly increase dose. Discussed misscarriage rates with hypothyroidism as well as complications with uncontrolled hypothyroidism    2. PCOS- being followed by OB.     FOLLOWUP  F/U 6 Monthswith TSH, FT4

## 2020-06-12 ENCOUNTER — PATIENT MESSAGE (OUTPATIENT)
Dept: ENDOCRINOLOGY | Facility: CLINIC | Age: 35
End: 2020-06-12

## 2020-06-12 DIAGNOSIS — E03.9 HYPOTHYROIDISM, UNSPECIFIED TYPE: Primary | ICD-10-CM

## 2020-06-12 NOTE — TELEPHONE ENCOUNTER
Check TSH. See if can do it today or Tomorrow    Needs TSH monthly x 3 and then f/u after last set. OK to do virtual visit

## 2020-06-15 ENCOUNTER — LAB VISIT (OUTPATIENT)
Dept: LAB | Facility: HOSPITAL | Age: 35
End: 2020-06-15
Attending: INTERNAL MEDICINE
Payer: COMMERCIAL

## 2020-06-15 DIAGNOSIS — E03.9 HYPOTHYROIDISM, UNSPECIFIED TYPE: ICD-10-CM

## 2020-06-15 LAB — TSH SERPL DL<=0.005 MIU/L-ACNC: 2.83 UIU/ML (ref 0.4–4)

## 2020-06-15 PROCEDURE — 36415 COLL VENOUS BLD VENIPUNCTURE: CPT | Mod: PO

## 2020-06-15 PROCEDURE — 84443 ASSAY THYROID STIM HORMONE: CPT

## 2020-06-16 ENCOUNTER — TELEPHONE (OUTPATIENT)
Dept: ENDOCRINOLOGY | Facility: CLINIC | Age: 35
End: 2020-06-16

## 2020-06-16 DIAGNOSIS — E03.9 HYPOTHYROIDISM, UNSPECIFIED TYPE: Primary | ICD-10-CM

## 2020-06-16 RX ORDER — LEVOTHYROXINE SODIUM 112 UG/1
TABLET ORAL
Qty: 35 TABLET | Refills: 4 | Status: SHIPPED | OUTPATIENT
Start: 2020-06-16 | End: 2020-06-18

## 2020-06-16 NOTE — TELEPHONE ENCOUNTER
Let her know would like to keep her TSh < 2.5    Increase synthroid to 112 mcg 6 days a week and 1.5 tablets on day 7    Check TSH in 6 weeks   Yes

## 2020-06-18 ENCOUNTER — PATIENT MESSAGE (OUTPATIENT)
Dept: ENDOCRINOLOGY | Facility: CLINIC | Age: 35
End: 2020-06-18

## 2020-06-18 ENCOUNTER — PATIENT OUTREACH (OUTPATIENT)
Dept: ADMINISTRATIVE | Facility: OTHER | Age: 35
End: 2020-06-18

## 2020-06-18 RX ORDER — LEVOTHYROXINE SODIUM 112 UG/1
TABLET ORAL
Qty: 35 TABLET | Refills: 4 | Status: SHIPPED | OUTPATIENT
Start: 2020-06-18 | End: 2020-06-22

## 2020-06-19 ENCOUNTER — LAB VISIT (OUTPATIENT)
Dept: LAB | Facility: HOSPITAL | Age: 35
End: 2020-06-19
Attending: OBSTETRICS & GYNECOLOGY
Payer: COMMERCIAL

## 2020-06-19 ENCOUNTER — OFFICE VISIT (OUTPATIENT)
Dept: OBSTETRICS AND GYNECOLOGY | Facility: CLINIC | Age: 35
End: 2020-06-19
Payer: COMMERCIAL

## 2020-06-19 VITALS — WEIGHT: 166.25 LBS | BODY MASS INDEX: 30.4 KG/M2 | SYSTOLIC BLOOD PRESSURE: 112 MMHG | DIASTOLIC BLOOD PRESSURE: 64 MMHG

## 2020-06-19 DIAGNOSIS — Z34.90 PREGNANCY, UNSPECIFIED GESTATIONAL AGE: ICD-10-CM

## 2020-06-19 DIAGNOSIS — Z32.00 POSSIBLE PREGNANCY: Primary | ICD-10-CM

## 2020-06-19 LAB
ABO + RH BLD: NORMAL
B-HCG UR QL: POSITIVE
BLD GP AB SCN CELLS X3 SERPL QL: NORMAL
CTP QC/QA: YES
ERYTHROCYTE [DISTWIDTH] IN BLOOD BY AUTOMATED COUNT: 12.3 % (ref 11.5–14.5)
HCT VFR BLD AUTO: 37 % (ref 37–48.5)
HGB BLD-MCNC: 12 G/DL (ref 12–16)
MCH RBC QN AUTO: 30.4 PG (ref 27–31)
MCHC RBC AUTO-ENTMCNC: 32.4 G/DL (ref 32–36)
MCV RBC AUTO: 94 FL (ref 82–98)
PLATELET # BLD AUTO: 218 K/UL (ref 150–350)
PMV BLD AUTO: 10 FL (ref 9.2–12.9)
RBC # BLD AUTO: 3.95 M/UL (ref 4–5.4)
T4 FREE SERPL-MCNC: 1.14 NG/DL (ref 0.71–1.51)
TSH SERPL DL<=0.005 MIU/L-ACNC: 4.13 UIU/ML (ref 0.4–4)
WBC # BLD AUTO: 4.86 K/UL (ref 3.9–12.7)

## 2020-06-19 PROCEDURE — 3008F BODY MASS INDEX DOCD: CPT | Mod: CPTII,S$GLB,, | Performed by: OBSTETRICS & GYNECOLOGY

## 2020-06-19 PROCEDURE — 86901 BLOOD TYPING SEROLOGIC RH(D): CPT

## 2020-06-19 PROCEDURE — 3008F PR BODY MASS INDEX (BMI) DOCUMENTED: ICD-10-PCS | Mod: CPTII,S$GLB,, | Performed by: OBSTETRICS & GYNECOLOGY

## 2020-06-19 PROCEDURE — 87491 CHLMYD TRACH DNA AMP PROBE: CPT

## 2020-06-19 PROCEDURE — 99203 PR OFFICE/OUTPT VISIT, NEW, LEVL III, 30-44 MIN: ICD-10-PCS | Mod: S$GLB,,, | Performed by: OBSTETRICS & GYNECOLOGY

## 2020-06-19 PROCEDURE — 87086 URINE CULTURE/COLONY COUNT: CPT

## 2020-06-19 PROCEDURE — 99203 OFFICE O/P NEW LOW 30 MIN: CPT | Mod: S$GLB,,, | Performed by: OBSTETRICS & GYNECOLOGY

## 2020-06-19 PROCEDURE — 84443 ASSAY THYROID STIM HORMONE: CPT

## 2020-06-19 PROCEDURE — 36415 COLL VENOUS BLD VENIPUNCTURE: CPT | Mod: PO

## 2020-06-19 PROCEDURE — 86762 RUBELLA ANTIBODY: CPT

## 2020-06-19 PROCEDURE — 99999 PR PBB SHADOW E&M-EST. PATIENT-LVL III: ICD-10-PCS | Mod: PBBFAC,,, | Performed by: OBSTETRICS & GYNECOLOGY

## 2020-06-19 PROCEDURE — 86592 SYPHILIS TEST NON-TREP QUAL: CPT

## 2020-06-19 PROCEDURE — 84439 ASSAY OF FREE THYROXINE: CPT

## 2020-06-19 PROCEDURE — 87340 HEPATITIS B SURFACE AG IA: CPT

## 2020-06-19 PROCEDURE — 85027 COMPLETE CBC AUTOMATED: CPT

## 2020-06-19 PROCEDURE — 99999 PR PBB SHADOW E&M-EST. PATIENT-LVL III: CPT | Mod: PBBFAC,,, | Performed by: OBSTETRICS & GYNECOLOGY

## 2020-06-19 RX ORDER — LEVOTHYROXINE SODIUM 112 UG/1
TABLET ORAL
COMMUNITY
Start: 2020-03-14 | End: 2020-06-22

## 2020-06-19 NOTE — LETTER
June 19, 2020      Diego Noriega MD  [address] [address]  [address]  [facility]  [facility] [address]           [place] - OBGYN  [address] [address], [facility] [address]  [facility] [address]  Phone: [phone]  Fax: [phone]          Patient: Monika Connell   MR Number: [id number]   YOB: 1985   Date of Visit: 6/19/2020       Dear Dr. Diego Noriega:    Thank you for referring Monika Connell to me for evaluation. Attached you will find relevant portions of my assessment and plan of care.    If you have questions, please do not hesitate to call me. I look forward to following Monika Connell along with you.    Sincerely,    Sai Harris MD    Enclosure  CC:  No Recipients    If you would like to receive this communication electronically, please contact [email or web address] or [phone] to request more information on [facility] Link access.    For providers and/or their staff who would like to refer a patient to [facility], please contact us through our one-stop-shop provider referral line, [facility], at [phone].    If you feel you have received this communication in error or would no longer like to receive these types of communications, please e-mail [email or web address]

## 2020-06-19 NOTE — PROGRESS NOTES
History of Present Illness   34 y.o.  female  (Term c/sec x 1) patient presents today for new OB from [facility] yesterday- u/s done 6w6d yesterday.   LMP: 3/17/2020- irregular. U/s 2020= EDC 6w6d, amanda edc= 2021  15 minutes spent with patient with > 1/2 time in counseling.    Seeing Dr. Lanza for hypothyroid    Past medical and surgical history reviewed.   I have reviewed the patient's medical history in detail and updated the computerized patient record.      Please let me know if you have any questions.    Review of patient's allergies indicates:   Allergen Reactions    Pcn [penicillins]          Past Medical History:   Diagnosis Date    ADHD (attention deficit hyperactivity disorder)     Allergy     Anxiety     Depression     Thyroid disease        Past Surgical History:   Procedure Laterality Date     SECTION         MEDS:   Current Outpatient Medications on File Prior to Visit   Medication Sig Dispense Refill    multivit with min-folic acid (ADULT MULTIVITAMIN GUMMIES) 200 mcg Chew Take 1 tablet by mouth once daily.      SYNTHROID 112 mcg tablet 1 tablet 6 days a week and 1.5 tablets on day 7. Brandname Synthroid 35 tablet 4    ALPRAZolam (XANAX) 0.5 MG tablet Take 1 tablet (0.5 mg total) by mouth as needed. (Patient not taking: Reported on 2020) 20 tablet 2    buPROPion (WELLBUTRIN XL) 300 MG 24 hr tablet TAKE 1 TABLET(300 MG) BY MOUTH EVERY DAY (Patient not taking: Reported on 2020) 90 tablet 1    dextroamphetamine-amphetamine (ADDERALL) 20 mg tablet Take 1 tablet by mouth 2 (two) times daily. (Patient not taking: Reported on 2020) 60 tablet 0    SYNTHROID 112 mcg tablet        No current facility-administered medications on file prior to visit.        OB History        1    Para        Term                AB        Living           SAB        TAB        Ectopic        Multiple        Live Births                     Social History      Socioeconomic History    Marital status:      Spouse name: Not on file    Number of children: Not on file    Years of education: Not on file    Highest education level: Not on file   Occupational History     Employer: OTHER   Social Needs    Financial resource strain: Not on file    Food insecurity     Worry: Not on file     Inability: Not on file    Transportation needs     Medical: Not on file     Non-medical: Not on file   Tobacco Use    Smoking status: Former Smoker     Types: Cigarettes     Quit date: 2008     Years since quittin.3    Smokeless tobacco: Never Used   Substance and Sexual Activity    Alcohol use: No    Drug use: No    Sexual activity: Yes     Partners: Female, Male   Lifestyle    Physical activity     Days per week: Not on file     Minutes per session: Not on file    Stress: Not on file   Relationships    Social connections     Talks on phone: Not on file     Gets together: Not on file     Attends Che[employer] service: Not on file     Active member of club or organization: Not on file     Attends meetings of clubs or organizations: Not on file     Relationship status: Not on file   Other Topics Concern    Not on file   Social History Narrative    Not on file       History reviewed. No pertinent family history.      Review of Systems - Negative except HPI  GEN ROS: negative for - chills or fever  Breast ROS: negative for breast lumps  Genito-Urinary ROS: no dysuria, trouble voiding, or hematuria     Urine pregnancy test in office: POSITIVE    Physical Examination:  /64   Wt 75.4 kg (166 lb 3.6 oz)   LMP 2020 (Exact Date)   BMI 30.40 kg/m²    deferred      Assessment:  Early pregnancy transfer  1. Possible pregnancy  POCT urine pregnancy   2. Pregnancy, unspecified gestational age  Urine culture    C. trachomatis/N. gonorrhoeae by AMP DNA [facility]; Urine       Plan:  Bleeding/pain precautions   ultrasound with MD at follow up if no FHT  Prenatal  vitamins prescription written  Records release today.   Follow up 3 weeks    Patient informed will be contacted with results within 2 weeks. Encouraged to please call back or email if she has not heard from us by then.    RECORDS REIEW:  6/18/2020- Leann u/s= crl=7mm c/w 6w5d by u/s giving edc=2/6/2021, positive flicker  PAP 06/01/2020= WNL

## 2020-06-20 ENCOUNTER — PATIENT MESSAGE (OUTPATIENT)
Dept: ENDOCRINOLOGY | Facility: CLINIC | Age: 35
End: 2020-06-20

## 2020-06-20 DIAGNOSIS — E03.9 HYPOTHYROIDISM, UNSPECIFIED TYPE: Primary | ICD-10-CM

## 2020-06-20 LAB — RPR SER QL: NORMAL

## 2020-06-21 LAB — BACTERIA UR CULT: NO GROWTH

## 2020-06-22 ENCOUNTER — PATIENT MESSAGE (OUTPATIENT)
Dept: ENDOCRINOLOGY | Facility: CLINIC | Age: 35
End: 2020-06-22

## 2020-06-22 LAB
C TRACH DNA SPEC QL NAA+PROBE: NOT DETECTED
HBV SURFACE AG SERPL QL IA: NEGATIVE
N GONORRHOEA DNA SPEC QL NAA+PROBE: NOT DETECTED
RUBV IGG SER-ACNC: 26.5 IU/ML
RUBV IGG SER-IMP: REACTIVE

## 2020-06-22 RX ORDER — LEVOTHYROXINE SODIUM 137 UG/1
137 TABLET ORAL
Qty: 30 TABLET | Refills: 11 | Status: SHIPPED | OUTPATIENT
Start: 2020-06-22 | End: 2020-08-21

## 2020-07-08 ENCOUNTER — PATIENT OUTREACH (OUTPATIENT)
Dept: ADMINISTRATIVE | Facility: OTHER | Age: 35
End: 2020-07-08

## 2020-07-09 ENCOUNTER — ROUTINE PRENATAL (OUTPATIENT)
Dept: OBSTETRICS AND GYNECOLOGY | Facility: CLINIC | Age: 35
End: 2020-07-09
Payer: COMMERCIAL

## 2020-07-09 VITALS
DIASTOLIC BLOOD PRESSURE: 68 MMHG | SYSTOLIC BLOOD PRESSURE: 122 MMHG | WEIGHT: 170.44 LBS | BODY MASS INDEX: 31.17 KG/M2

## 2020-07-09 DIAGNOSIS — Z34.90 PREGNANCY, UNSPECIFIED GESTATIONAL AGE: ICD-10-CM

## 2020-07-09 DIAGNOSIS — Z12.4 PAP SMEAR FOR CERVICAL CANCER SCREENING: Primary | ICD-10-CM

## 2020-07-09 LAB
BILIRUB SERPL-MCNC: NORMAL MG/DL
BLOOD URINE, POC: NORMAL
CLARITY, POC UA: NORMAL
COLOR, POC UA: NORMAL
GLUCOSE UR QL STRIP: NORMAL
KETONES UR QL STRIP: NORMAL
LEUKOCYTE ESTERASE URINE, POC: NORMAL
NITRITE, POC UA: NORMAL
PH, POC UA: 5
PROTEIN, POC: NORMAL
SPECIFIC GRAVITY, POC UA: NORMAL
UROBILINOGEN, POC UA: NORMAL

## 2020-07-09 PROCEDURE — 99999 PR PBB SHADOW E&M-EST. PATIENT-LVL II: CPT | Mod: PBBFAC,,, | Performed by: OBSTETRICS & GYNECOLOGY

## 2020-07-09 PROCEDURE — 0502F SUBSEQUENT PRENATAL CARE: CPT | Mod: CPTII,S$GLB,, | Performed by: OBSTETRICS & GYNECOLOGY

## 2020-07-09 PROCEDURE — 0502F PR SUBSEQUENT PRENATAL CARE: ICD-10-PCS | Mod: CPTII,S$GLB,, | Performed by: OBSTETRICS & GYNECOLOGY

## 2020-07-09 PROCEDURE — 99999 PR PBB SHADOW E&M-EST. PATIENT-LVL II: ICD-10-PCS | Mod: PBBFAC,,, | Performed by: OBSTETRICS & GYNECOLOGY

## 2020-07-09 NOTE — PROGRESS NOTES
Complaints today: nausea improving,No Bleeding or pains  Abdominal u/s confirmed viable - FHT= 175    /68   Wt 77.3 kg (170 lb 6.7 oz)   LMP 2020 (Exact Date)   BMI 31.17 kg/m²     34 y.o., at 9w5d by Estimated Date of Delivery: 21  Patient Active Problem List   Diagnosis    Hypothyroidism    Attention deficit disorder    Depression with anxiety     OB History    Para Term  AB Living   3 2 1     1   SAB TAB Ectopic Multiple Live Births           1      # Outcome Date GA Lbr Aman/2nd Weight Sex Delivery Anes PTL Lv   3 Current            2 Para            1 Term     U CS-LTranv  N SHRADDHA      Birth Comments: System Generated. Please review and update pregnancy details.       Dating reviewed    Allergies and problem list reviewed and updated    Medical and surgical history reviewed    Prenatal labs reviewed and updated    Physical Exam:  ABD: soft, gravid, nontender    Assessment:  9 weeks, doing well    Plan:    follow up 3 Weeks, bleeding/pain precautions , PAP at follow up  Repeat HI with harmony testing.

## 2020-07-19 ENCOUNTER — PATIENT OUTREACH (OUTPATIENT)
Dept: ADMINISTRATIVE | Facility: OTHER | Age: 35
End: 2020-07-19

## 2020-07-19 NOTE — PROGRESS NOTES
Requested updates within Care Everywhere.  Patient's chart was reviewed for overdue ELLIOT topics.  Immunizations reconciled.

## 2020-07-20 ENCOUNTER — LAB VISIT (OUTPATIENT)
Dept: LAB | Facility: HOSPITAL | Age: 35
End: 2020-07-20
Attending: INTERNAL MEDICINE
Payer: COMMERCIAL

## 2020-07-20 ENCOUNTER — ROUTINE PRENATAL (OUTPATIENT)
Dept: OBSTETRICS AND GYNECOLOGY | Facility: CLINIC | Age: 35
End: 2020-07-20
Payer: COMMERCIAL

## 2020-07-20 VITALS
BODY MASS INDEX: 31.05 KG/M2 | SYSTOLIC BLOOD PRESSURE: 118 MMHG | WEIGHT: 169.75 LBS | DIASTOLIC BLOOD PRESSURE: 70 MMHG

## 2020-07-20 DIAGNOSIS — Z34.90 PREGNANCY, UNSPECIFIED GESTATIONAL AGE: Primary | ICD-10-CM

## 2020-07-20 DIAGNOSIS — Z12.4 PAP SMEAR FOR CERVICAL CANCER SCREENING: ICD-10-CM

## 2020-07-20 DIAGNOSIS — Z3A.11 11 WEEKS GESTATION OF PREGNANCY: ICD-10-CM

## 2020-07-20 DIAGNOSIS — Z34.90 PREGNANCY, UNSPECIFIED GESTATIONAL AGE: ICD-10-CM

## 2020-07-20 DIAGNOSIS — E03.9 HYPOTHYROIDISM, UNSPECIFIED TYPE: ICD-10-CM

## 2020-07-20 PROCEDURE — 36415 COLL VENOUS BLD VENIPUNCTURE: CPT | Mod: PO

## 2020-07-20 PROCEDURE — 0502F SUBSEQUENT PRENATAL CARE: CPT | Mod: CPTII,S$GLB,, | Performed by: OBSTETRICS & GYNECOLOGY

## 2020-07-20 PROCEDURE — 86703 HIV-1/HIV-2 1 RESULT ANTBDY: CPT

## 2020-07-20 PROCEDURE — 0502F PR SUBSEQUENT PRENATAL CARE: ICD-10-PCS | Mod: CPTII,S$GLB,, | Performed by: OBSTETRICS & GYNECOLOGY

## 2020-07-20 PROCEDURE — 84443 ASSAY THYROID STIM HORMONE: CPT

## 2020-07-20 PROCEDURE — 99999 PR PBB SHADOW E&M-EST. PATIENT-LVL II: ICD-10-PCS | Mod: PBBFAC,,, | Performed by: OBSTETRICS & GYNECOLOGY

## 2020-07-20 PROCEDURE — 99999 PR PBB SHADOW E&M-EST. PATIENT-LVL II: CPT | Mod: PBBFAC,,, | Performed by: OBSTETRICS & GYNECOLOGY

## 2020-07-20 NOTE — PROGRESS NOTES
Complaints today: none,No Bleeding or pains    /70   Wt 77 kg (169 lb 12.1 oz)   LMP 2020 (Exact Date)   BMI 31.05 kg/m²     34 y.o., at 11w2d by Estimated Date of Delivery: 21  Patient Active Problem List   Diagnosis    Hypothyroidism    Attention deficit disorder    Depression with anxiety     OB History    Para Term  AB Living   3 2 1     1   SAB TAB Ectopic Multiple Live Births           1      # Outcome Date GA Lbr Aman/2nd Weight Sex Delivery Anes PTL Lv   3 Current            2 Para            1 Term     U CS-LTranv  N SHRADDHA      Birth Comments: System Generated. Please review and update pregnancy details.       Dating reviewed    Allergies and problem list reviewed and updated    Medical and surgical history reviewed    Prenatal labs reviewed and updated    Physical Exam:  ABD: soft, gravid, nontender    Assessment:  11 weeks, doing well    Plan:   AMHIEN - jonny today, MFM u/s at 20 weeks   follow up 3 Weeks, bleeding/pain precautions,  kick counts, labor precautions

## 2020-07-21 ENCOUNTER — TELEPHONE (OUTPATIENT)
Dept: ENDOCRINOLOGY | Facility: CLINIC | Age: 35
End: 2020-07-21

## 2020-07-21 ENCOUNTER — PATIENT MESSAGE (OUTPATIENT)
Dept: OBSTETRICS AND GYNECOLOGY | Facility: CLINIC | Age: 35
End: 2020-07-21

## 2020-07-21 DIAGNOSIS — E03.9 HYPOTHYROIDISM, UNSPECIFIED TYPE: Primary | ICD-10-CM

## 2020-07-21 LAB
HIV 1+2 AB+HIV1 P24 AG SERPL QL IA: NEGATIVE
TSH SERPL DL<=0.005 MIU/L-ACNC: 1.8 UIU/ML (ref 0.4–4)

## 2020-07-22 ENCOUNTER — PATIENT MESSAGE (OUTPATIENT)
Dept: ENDOCRINOLOGY | Facility: CLINIC | Age: 35
End: 2020-07-22

## 2020-07-22 NOTE — TELEPHONE ENCOUNTER
We se her in Sept. So sometime prior to f/u is a little over 1 month which should be fine. Was trying to time around her appt

## 2020-07-22 NOTE — TELEPHONE ENCOUNTER
Spoke to pt and adv of Dr Lanza's previous message. Pt stated she thought she had to have labs monthly, please advise. If so , we will schedule [facility] labs in [place], she said we do not need to call her back.

## 2020-07-23 ENCOUNTER — PATIENT MESSAGE (OUTPATIENT)
Dept: ENDOCRINOLOGY | Facility: CLINIC | Age: 35
End: 2020-07-23

## 2020-07-24 ENCOUNTER — PATIENT MESSAGE (OUTPATIENT)
Dept: ENDOCRINOLOGY | Facility: CLINIC | Age: 35
End: 2020-07-24

## 2020-07-29 ENCOUNTER — PATIENT MESSAGE (OUTPATIENT)
Dept: OBSTETRICS AND GYNECOLOGY | Facility: CLINIC | Age: 35
End: 2020-07-29

## 2020-08-06 ENCOUNTER — PATIENT OUTREACH (OUTPATIENT)
Dept: ADMINISTRATIVE | Facility: OTHER | Age: 35
End: 2020-08-06

## 2020-08-10 ENCOUNTER — ROUTINE PRENATAL (OUTPATIENT)
Dept: OBSTETRICS AND GYNECOLOGY | Facility: CLINIC | Age: 35
End: 2020-08-10
Payer: COMMERCIAL

## 2020-08-10 VITALS
DIASTOLIC BLOOD PRESSURE: 72 MMHG | SYSTOLIC BLOOD PRESSURE: 120 MMHG | BODY MASS INDEX: 31.53 KG/M2 | WEIGHT: 172.38 LBS

## 2020-08-10 DIAGNOSIS — E03.9 HYPOTHYROIDISM, UNSPECIFIED TYPE: ICD-10-CM

## 2020-08-10 DIAGNOSIS — Z3A.14 14 WEEKS GESTATION OF PREGNANCY: Primary | ICD-10-CM

## 2020-08-10 LAB
BILIRUB SERPL-MCNC: NORMAL MG/DL
BLOOD URINE, POC: NORMAL
CLARITY, POC UA: NORMAL
COLOR, POC UA: NORMAL
GLUCOSE UR QL STRIP: NORMAL
KETONES UR QL STRIP: NORMAL
LEUKOCYTE ESTERASE URINE, POC: NORMAL
NITRITE, POC UA: NORMAL
PH, POC UA: 7
PROTEIN, POC: NORMAL
SPECIFIC GRAVITY, POC UA: NORMAL
UROBILINOGEN, POC UA: NORMAL

## 2020-08-10 PROCEDURE — 0502F PR SUBSEQUENT PRENATAL CARE: ICD-10-PCS | Mod: CPTII,S$GLB,, | Performed by: OBSTETRICS & GYNECOLOGY

## 2020-08-10 PROCEDURE — 99999 PR PBB SHADOW E&M-EST. PATIENT-LVL II: ICD-10-PCS | Mod: PBBFAC,,, | Performed by: OBSTETRICS & GYNECOLOGY

## 2020-08-10 PROCEDURE — 99999 PR PBB SHADOW E&M-EST. PATIENT-LVL II: CPT | Mod: PBBFAC,,, | Performed by: OBSTETRICS & GYNECOLOGY

## 2020-08-10 PROCEDURE — 0502F SUBSEQUENT PRENATAL CARE: CPT | Mod: CPTII,S$GLB,, | Performed by: OBSTETRICS & GYNECOLOGY

## 2020-08-10 NOTE — PROGRESS NOTES
Complaints today: insomnia - counseled, Good fetal movements reported, No Bleeding or pains    /72   Wt 78.2 kg (172 lb 6.4 oz)   LMP 2020 (Exact Date)   BMI 31.53 kg/m²     34 y.o., at 14w2d by Estimated Date of Delivery: 21  Patient Active Problem List   Diagnosis    Hypothyroidism    Attention deficit disorder    Depression with anxiety     OB History    Para Term  AB Living   3 2 1     1   SAB TAB Ectopic Multiple Live Births           1      # Outcome Date GA Lbr Aman/2nd Weight Sex Delivery Anes PTL Lv   3 Current            2 Para            1 Term     U CS-LTranv  N SHRADDHA      Birth Comments: System Generated. Please review and update pregnancy details.       Dating reviewed    Allergies and problem list reviewed and updated    Medical and surgical history reviewed    Prenatal labs reviewed and updated    Physical Exam:  ABD: soft, gravid, nontender,    Assessment:  14 weeks,doing well    Plan:    follow up 4 Weeks, bleeding/pain precautions , MFM constul - hypothyroid / T1 ADD meds

## 2020-08-11 ENCOUNTER — TELEPHONE (OUTPATIENT)
Dept: MATERNAL FETAL MEDICINE | Facility: CLINIC | Age: 35
End: 2020-08-11

## 2020-08-11 NOTE — TELEPHONE ENCOUNTER
Phone call to patient to schedule MFM consult and ultrasound. Discussed the schedule options and patient would like to be seen in [place] at [facility]. Patient will be contacted to schedule as soon as the schedule is open.    Pt verbalized understanding of information.

## 2020-08-18 ENCOUNTER — PATIENT MESSAGE (OUTPATIENT)
Dept: ENDOCRINOLOGY | Facility: CLINIC | Age: 35
End: 2020-08-18

## 2020-08-19 ENCOUNTER — PATIENT MESSAGE (OUTPATIENT)
Dept: ENDOCRINOLOGY | Facility: CLINIC | Age: 35
End: 2020-08-19

## 2020-08-20 ENCOUNTER — LAB VISIT (OUTPATIENT)
Dept: LAB | Facility: HOSPITAL | Age: 35
End: 2020-08-20
Attending: INTERNAL MEDICINE
Payer: COMMERCIAL

## 2020-08-20 DIAGNOSIS — E03.9 HYPOTHYROIDISM, UNSPECIFIED TYPE: ICD-10-CM

## 2020-08-20 LAB — TSH SERPL DL<=0.005 MIU/L-ACNC: 2.64 UIU/ML (ref 0.4–4)

## 2020-08-20 PROCEDURE — 84443 ASSAY THYROID STIM HORMONE: CPT

## 2020-08-20 PROCEDURE — 36415 COLL VENOUS BLD VENIPUNCTURE: CPT | Mod: PO

## 2020-08-21 ENCOUNTER — TELEPHONE (OUTPATIENT)
Dept: ENDOCRINOLOGY | Facility: CLINIC | Age: 35
End: 2020-08-21

## 2020-08-21 ENCOUNTER — PATIENT MESSAGE (OUTPATIENT)
Dept: ENDOCRINOLOGY | Facility: CLINIC | Age: 35
End: 2020-08-21

## 2020-08-21 DIAGNOSIS — E03.9 HYPOTHYROIDISM, UNSPECIFIED TYPE: Primary | ICD-10-CM

## 2020-08-21 RX ORDER — LEVOTHYROXINE SODIUM 150 UG/1
150 TABLET ORAL
Qty: 30 TABLET | Refills: 11 | Status: SHIPPED | OUTPATIENT
Start: 2020-08-21 | End: 2021-01-12

## 2020-09-04 ENCOUNTER — ROUTINE PRENATAL (OUTPATIENT)
Dept: OBSTETRICS AND GYNECOLOGY | Facility: CLINIC | Age: 35
End: 2020-09-04
Payer: COMMERCIAL

## 2020-09-04 VITALS
BODY MASS INDEX: 32.18 KG/M2 | WEIGHT: 175.94 LBS | SYSTOLIC BLOOD PRESSURE: 128 MMHG | DIASTOLIC BLOOD PRESSURE: 80 MMHG

## 2020-09-04 DIAGNOSIS — Z3A.17 17 WEEKS GESTATION OF PREGNANCY: Primary | ICD-10-CM

## 2020-09-04 LAB
BILIRUB SERPL-MCNC: NEGATIVE MG/DL
BLOOD URINE, POC: NEGATIVE
CLARITY, POC UA: NORMAL
COLOR, POC UA: NORMAL
GLUCOSE UR QL STRIP: POSITIVE
KETONES UR QL STRIP: NORMAL
LEUKOCYTE ESTERASE URINE, POC: NEGATIVE
NITRITE, POC UA: NEGATIVE
PH, POC UA: 5
PROTEIN, POC: NORMAL
SPECIFIC GRAVITY, POC UA: NORMAL
UROBILINOGEN, POC UA: NORMAL

## 2020-09-04 PROCEDURE — 99999 PR PBB SHADOW E&M-EST. PATIENT-LVL III: CPT | Mod: PBBFAC,,, | Performed by: OBSTETRICS & GYNECOLOGY

## 2020-09-04 PROCEDURE — 0502F SUBSEQUENT PRENATAL CARE: CPT | Mod: CPTII,S$GLB,, | Performed by: OBSTETRICS & GYNECOLOGY

## 2020-09-04 PROCEDURE — 99999 PR PBB SHADOW E&M-EST. PATIENT-LVL III: ICD-10-PCS | Mod: PBBFAC,,, | Performed by: OBSTETRICS & GYNECOLOGY

## 2020-09-04 PROCEDURE — 0502F PR SUBSEQUENT PRENATAL CARE: ICD-10-PCS | Mod: CPTII,S$GLB,, | Performed by: OBSTETRICS & GYNECOLOGY

## 2020-09-04 NOTE — PROGRESS NOTES
Complaints today: none, Good fetal movements reported, No Bleeding or pains    /80   Wt 79.8 kg (175 lb 14.8 oz)   LMP 2020 (Exact Date)   BMI 32.18 kg/m²     34 y.o., at 17w6d by Estimated Date of Delivery: 21  Patient Active Problem List   Diagnosis    Hypothyroidism    Attention deficit disorder    Depression with anxiety     OB History    Para Term  AB Living   3 2 1     1   SAB TAB Ectopic Multiple Live Births           1      # Outcome Date GA Lbr Aman/2nd Weight Sex Delivery Anes PTL Lv   3 Current            2 Para            1 Term     U CS-LTranv  N SHRADDHA      Birth Comments: System Generated. Please review and update pregnancy details.       Dating reviewed    Allergies and problem list reviewed and updated    Medical and surgical history reviewed    Prenatal labs reviewed and updated    Physical Exam:  ABD: soft, gravid, nontender    Assessment:  17 weeksdoing well    Plan:   U/s scheduled 2 weeks   follow up 4 Weeks, bleeding/pain precautions

## 2020-09-11 ENCOUNTER — PATIENT OUTREACH (OUTPATIENT)
Dept: ADMINISTRATIVE | Facility: OTHER | Age: 35
End: 2020-09-11

## 2020-09-11 ENCOUNTER — LAB VISIT (OUTPATIENT)
Dept: LAB | Facility: HOSPITAL | Age: 35
End: 2020-09-11
Attending: INTERNAL MEDICINE
Payer: COMMERCIAL

## 2020-09-11 DIAGNOSIS — E03.9 HYPOTHYROIDISM, UNSPECIFIED TYPE: ICD-10-CM

## 2020-09-11 PROCEDURE — 84443 ASSAY THYROID STIM HORMONE: CPT

## 2020-09-11 PROCEDURE — 36415 COLL VENOUS BLD VENIPUNCTURE: CPT | Mod: PO

## 2020-09-11 NOTE — PROGRESS NOTES
Health Maintenance Due   Topic Date Due    TETANUS VACCINE  03/17/2018    Influenza Vaccine (1) 08/01/2020     Updates were requested from care everywhere.  Chart was reviewed for overdue Proactive [facility] Encounters (ELLIOT) topics (CRS, Breast Cancer Screening, Eye exam)  Health Maintenance has been updated.  LINKS immunization registry triggered.  Immunizations were reconciled.

## 2020-09-12 LAB — TSH SERPL DL<=0.005 MIU/L-ACNC: 2.76 UIU/ML (ref 0.4–4)

## 2020-09-14 ENCOUNTER — OFFICE VISIT (OUTPATIENT)
Dept: ENDOCRINOLOGY | Facility: CLINIC | Age: 35
End: 2020-09-14
Payer: COMMERCIAL

## 2020-09-14 ENCOUNTER — PROCEDURE VISIT (OUTPATIENT)
Dept: MATERNAL FETAL MEDICINE | Facility: CLINIC | Age: 35
End: 2020-09-14
Payer: COMMERCIAL

## 2020-09-14 ENCOUNTER — INITIAL CONSULT (OUTPATIENT)
Dept: MATERNAL FETAL MEDICINE | Facility: CLINIC | Age: 35
End: 2020-09-14
Payer: COMMERCIAL

## 2020-09-14 VITALS
SYSTOLIC BLOOD PRESSURE: 100 MMHG | DIASTOLIC BLOOD PRESSURE: 70 MMHG | WEIGHT: 175.94 LBS | BODY MASS INDEX: 32.37 KG/M2 | HEIGHT: 62 IN

## 2020-09-14 DIAGNOSIS — Z3A.14 14 WEEKS GESTATION OF PREGNANCY: ICD-10-CM

## 2020-09-14 DIAGNOSIS — E03.9 HYPOTHYROIDISM, UNSPECIFIED TYPE: Primary | ICD-10-CM

## 2020-09-14 DIAGNOSIS — Z13.79 GENETIC SCREENING: ICD-10-CM

## 2020-09-14 DIAGNOSIS — O35.EXX0 FETAL RENAL ANOMALY, SINGLE GESTATION: Primary | ICD-10-CM

## 2020-09-14 DIAGNOSIS — Z36.89 ENCOUNTER FOR ULTRASOUND TO CHECK FETAL GROWTH: ICD-10-CM

## 2020-09-14 DIAGNOSIS — Z03.73 SUSPECTED FETAL ANOMALY NOT FOUND: ICD-10-CM

## 2020-09-14 DIAGNOSIS — E03.9 HYPOTHYROIDISM, UNSPECIFIED TYPE: ICD-10-CM

## 2020-09-14 PROCEDURE — 99999 PR PBB SHADOW E&M-EST. PATIENT-LVL III: CPT | Mod: PBBFAC,,, | Performed by: OBSTETRICS & GYNECOLOGY

## 2020-09-14 PROCEDURE — 99999 PR PBB SHADOW E&M-EST. PATIENT-LVL III: ICD-10-PCS | Mod: PBBFAC,,, | Performed by: OBSTETRICS & GYNECOLOGY

## 2020-09-14 PROCEDURE — 99213 PR OFFICE/OUTPT VISIT, EST, LEVL III, 20-29 MIN: ICD-10-PCS | Mod: 95,,, | Performed by: INTERNAL MEDICINE

## 2020-09-14 PROCEDURE — 99213 OFFICE O/P EST LOW 20 MIN: CPT | Mod: 95,,, | Performed by: INTERNAL MEDICINE

## 2020-09-14 PROCEDURE — 99214 OFFICE O/P EST MOD 30 MIN: CPT | Mod: 25,S$GLB,, | Performed by: OBSTETRICS & GYNECOLOGY

## 2020-09-14 PROCEDURE — 99214 PR OFFICE/OUTPT VISIT, EST, LEVL IV, 30-39 MIN: ICD-10-PCS | Mod: 25,S$GLB,, | Performed by: OBSTETRICS & GYNECOLOGY

## 2020-09-14 NOTE — PROGRESS NOTES
"Please see imaging tab for Viewpoint study performed today  Consult Visit:  Hypothyroidism, Eldest son diagnosed with  "micropenis"    Please see imaging tab for Viewpoint study performed today  Consult Visit:  Hypothyroidism, depression/anxiety, attention deficit disorder, prior son born with micropenis    Past medical history:  Attention deficit disorder, depression with anxiety, hypothyroidism  Past surgical history:  Prior  section  Past obstetric history 2013  delivery for failure to progress 7 lb 10 oz male infant  Social history:  Denies tobacco, drug, or alcohol use.  Former smoker.  Genetic screening:  Cell free DNA screening low risk   Family history:  Paternal cousin with cleft palate, 2nd cousin with trisomy 21  Allergies:  Penicillin  Medications:  Prenatal vitamin, Synthroid 150 mcg daily          1.  Hypothyroidism   Ms. Connell was diagnosed with hypothyroidism in  and is currently on Synthroid 150 mcg. Her last TSH was 2.76 on 20. She currently denies symptoms of thyroid dysfunction.  Counseling:    Thyroid disease occurs commonly in reproductive age women and is therefore frequently encountered in pregnancy. Thyroid binding globulin levels increase during pregnancy - resulting in increases in total T4 and T3. There is also an increased demand in thyroid hormone production resulting in enlargement of the thyroid gland due to glandular hyperplasia and increased iodine uptake. In addition, appropriate functioning of thyroid hormone is necessary for fetal neurological development. Hashimoto thyroiditis is the most common cause of hypothyroidism in pregnancy and is caused by glandular destruction by autoantibodies (antithyroid peroxidase antibodies anti-TPO). Other causes of hypothyroidism include iodine deficiency (rare in the [place]), postablation/thyroidectomy, and atrophic hypothyroidism. Hypothyroidism in pregnancy is of minimal concern unless left untreated. Untreated " hypothyroidism in the first trimester is associated with miscarriage, while in the second and third trimesters, increases in preeclampsia,  delivery (and its associated risk of prematurity) with fetal/ cretinism the most serious complication. Both TSH and Free T4 are the initial tests to evaluate thyroid function and interpretation of these tests should include the reference ranges established for pregnancy. Well controlled hypothyroidism significantly limits associated pregnancy complications.     Recommendations at [facility]:  1. Continue synthroid therapy and titrate doses as needed by 25 mcg.  Repeat thyroid studies every 4 weeks until TSH normalizes.   2.  Once well controlled, TSH levels can be monitored q trimester. Many women require and increase in their medication dose during pregnancy.  Generally , patients should be discharged postpartum on their prepregnancy dose of synthroid.     2.  Depression/Anxiety:  The patient has suffered from anxiety and depression for her entire life.  She denies suicidal or homicidal ideation.  The patient is currently on no antidepressant  Medications.  She was instructed to report any worsening symptoms.    3.  ADD:  The patient was diagnosed with  attention deficit disorder in 2018.  She stopped taking Adderall upon recognition of the pregnancy.  Off medication, the patient has some difficulty in focusing but is able to function well.  She works as an  and is currently in school to receive her master's degree.    4.  Prior son born with micropenis:  The patient reports that her son was diagnosed with micropenis shortly after birth.  He has been seen by specialists at [facility].  Generally, micropenis  is diagnosed after birth when the stretched penile length falls more than 2.5 standard deviations below the mean for age.  Micropenis may occur as  an isolated finding or in association with other conditions, especially those involving  hormone deficiencies.  Some of the potential factors causing micropenis due to hormonal deficiencies are hypogonadotropic hypogonadism resulting from an endocrine anomaly of the hypothalamic-pituitary-gonadal axis and androgen insensitivity syndrome with inadequate penile growth despite normal or excessive  androgen secretion.  Other potential causes of hypothalamic dysfunction such as Kallman's syndrome or Prader Willi syndrome are in the differential as well.  The most common cause of micropenis is failure of the hypothalamus to secrete gonadotropins.  Often times, hCG can be used to induce puberty instead of testosterone.      Both the patient and her  have seen specialists at [facility] and the patient has been counseled that her son will likely need HCG when he gets closer to the time of puberty.  Depending upon his exact etiology for micro penis, there may be some increased risk for this child as well.  The external genitalia appear normal on today's ultrasound examination, but micro penis is a diagnosis which is made after delivery when assessment of the penile length can be made.      5.  Bilateral renal pelvis dilatation:  On today's started ultrasound, bilateral renal pelvis seal dilatation was noted with the right and left renal pelvis is measuring 5.4 in 5.0 mm, respectively.  No other aneuploidy markers were identified.  No significant structural defects were detected.  Some of our anatomic views were suboptimal, but a structural abnormality was not suspected.  The amniotic fluid volume appeared normal, the bladder was of normal size, and there was no significant evidence of ureteral dilatation.  The ultrasound findings were discussed with the patient.  The patient is aware that renal pelvis ill dilatation may be a marker for fetal aneuploidy.  However, this marker is neither sensitive nor specific.  Additionally, the patient underwent cell free DNA screening and that returned as low  risk.  Therefore, her risk for aneuploidy is likely exceedingly low.  The patient was also cautioned that renal pelvis cell dilatation may be an early marker for a more significant obstructive process.  Out of an abundance of caution, a return visit was scheduled in 8 weeks to reassess the fetal kidneys.  The potential for vesicoureteral reflux was reviewed, and the patient is aware that  evaluation and antibiotic prophylaxis may be recommended.         At today's visit, I spent approximately 30 min in face-to-face interview with the patient and greater than 50% of this time was spent in counseling alone.  Our ultrasound findings were reviewed, the patient has full medical history was reviewed, and counseling was provided as documented above.  I very much appreciate having had the opportunity to participate in this patient's care.    Donna Rubio MD

## 2020-09-14 NOTE — PROGRESS NOTES
The patient location is: [place]    Visit type: audiovisual    Face to Face time with patient: 15  17 minutes of total time spent on the encounter, which includes face to face time and non-face to face time preparing to see the patient (eg, review of tests), Obtaining and/or reviewing separately obtained history, Documenting clinical information in the electronic or other health record, Independently interpreting results (not separately reported) and communicating results to the patient/family/caregiver, or Care coordination (not separately reported).         Each patient to whom he or she provides medical services by telemedicine is:  (1) informed of the relationship between the physician and patient and the respective role of any other health care provider with respect to management of the patient; and (2) notified that he or she may decline to receive medical services by telemedicine and may withdraw from such care at any time.         CHIEF COMPLAINT: Hypothyroidism  34-year-old female here for followup. Currently on Synthroid 150 mcg. On brandname. Just completed law school. Currently 19 weeks pregnant. Did anatomy Ultrasound today. Taking LT4 separate from prenatal. Has had some nausea and may have vomited LT4.       PAST MEDICAL HISTORY: Hypothyroid. PCOS.     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         ROS:   Constitutional: weight stable  Eyes: No recent visual changes  ENT: No dysphagia  Cardiovascular: Denies current anginal symptoms  Respiratory: Denies current respiratory difficulty  Gastrointestinal: Denies recent bowel disturbances  GenitoUrinary - No dysuria  Skin: No new skin rash  Neurologic: No focal neurologic complaints     PE: virtual visit      Results for JACQUELINE  SUMAYA MACHADO (MRN [id number]) as of 9/14/2020 15:58   Ref. Range 9/11/2020 07:43   TSH Latest Ref Range: 0.400 - 4.000 uIU/mL 2.764       A/P  1. Hypothyroidism- TPO+. Discussed thyroid and pregnancy. Goal TSH < 2.5. Has had some AM nausea. Will have her take LT4 at night and repeat in 4 weeks.     2. PCOS- being followed by OB.     FOLLOWUP  4 weeks- TSH, ft4 and Q 4 weeks until Feb  F/U 6 Monthswith TSH, FT4

## 2020-09-14 NOTE — LETTER
September 14, 2020      Sai Harris MD  [address] [address] [address]           [facility]  [address]  [facility] [address]  Phone: [phone]          Patient: Monika Connell   MR Number: [id number]   YOB: 1985   Date of Visit: 9/14/2020       Dear Dr. Sai Harris:    Thank you for referring Monika Connell to me for evaluation. Attached you will find relevant portions of my assessment and plan of care.    If you have questions, please do not hesitate to call me. I look forward to following Monika Connell along with you.    Sincerely,    Donna Rubio MD    Enclosure  CC:  No Recipients    If you would like to receive this communication electronically, please contact [email or web address] or [phone] to request more information on [employer] access.    For providers and/or their staff who would like to refer a patient to [facility], please contact us through our one-stop-shop provider referral line, [facility], at [phone].    If you feel you have received this communication in error or would no longer like to receive these types of communications, please e-mail [email or web address]

## 2020-09-29 ENCOUNTER — PATIENT MESSAGE (OUTPATIENT)
Dept: OBSTETRICS AND GYNECOLOGY | Facility: CLINIC | Age: 35
End: 2020-09-29

## 2020-09-30 ENCOUNTER — TELEPHONE (OUTPATIENT)
Dept: ENDOCRINOLOGY | Facility: CLINIC | Age: 35
End: 2020-09-30

## 2020-09-30 NOTE — TELEPHONE ENCOUNTER
Pt states since starting the thyroid medication at night, she's waking up with nausea and vomiting.  Please advise.

## 2020-10-02 ENCOUNTER — ROUTINE PRENATAL (OUTPATIENT)
Dept: OBSTETRICS AND GYNECOLOGY | Facility: CLINIC | Age: 35
End: 2020-10-02
Payer: COMMERCIAL

## 2020-10-02 VITALS — BODY MASS INDEX: 32.9 KG/M2 | WEIGHT: 179.88 LBS | DIASTOLIC BLOOD PRESSURE: 66 MMHG | SYSTOLIC BLOOD PRESSURE: 116 MMHG

## 2020-10-02 DIAGNOSIS — Z3A.21 21 WEEKS GESTATION OF PREGNANCY: ICD-10-CM

## 2020-10-02 DIAGNOSIS — E03.8 OTHER SPECIFIED HYPOTHYROIDISM: Primary | ICD-10-CM

## 2020-10-02 PROCEDURE — 99999 PR PBB SHADOW E&M-EST. PATIENT-LVL II: ICD-10-PCS | Mod: PBBFAC,,, | Performed by: OBSTETRICS & GYNECOLOGY

## 2020-10-02 PROCEDURE — 0502F SUBSEQUENT PRENATAL CARE: CPT | Mod: CPTII,S$GLB,, | Performed by: OBSTETRICS & GYNECOLOGY

## 2020-10-02 PROCEDURE — 99499 POCT URINE DIPSTICK WITHOUT MICROSCOPE: ICD-10-PCS | Mod: S$GLB,,, | Performed by: OBSTETRICS & GYNECOLOGY

## 2020-10-02 PROCEDURE — 99999 PR PBB SHADOW E&M-EST. PATIENT-LVL II: CPT | Mod: PBBFAC,,, | Performed by: OBSTETRICS & GYNECOLOGY

## 2020-10-02 PROCEDURE — 0502F PR SUBSEQUENT PRENATAL CARE: ICD-10-PCS | Mod: CPTII,S$GLB,, | Performed by: OBSTETRICS & GYNECOLOGY

## 2020-10-02 PROCEDURE — 99499 UNLISTED E&M SERVICE: CPT | Mod: S$GLB,,, | Performed by: OBSTETRICS & GYNECOLOGY

## 2020-10-02 NOTE — PROGRESS NOTES
Complaints today: none, Good fetal movements reported, No Bleeding or pains    /66   Wt 81.6 kg (179 lb 14.3 oz)   LMP 2020 (Exact Date)   BMI 32.90 kg/m²     34 y.o., at 21w6d by Estimated Date of Delivery: 21  Patient Active Problem List   Diagnosis    Hypothyroidism    Attention deficit disorder    Depression with anxiety     OB History    Para Term  AB Living   2 1 1     1   SAB TAB Ectopic Multiple Live Births           1      # Outcome Date GA Lbr Aman/2nd Weight Sex Delivery Anes PTL Lv   2 Current            1 Term 13 39w0d  3.459 kg (7 lb 10 oz) U CS-LTranv  N SHRADDHA      Birth Comments: System Generated. Please review and update pregnancy details.      Complications: Failure to Progress in First Stage       Dating reviewed    Allergies and problem list reviewed and updated    Medical and surgical history reviewed    Prenatal labs reviewed and updated    Physical Exam:  ABD: soft, gravid, nontender    Assessment:  21 weeks, doing well    Plan:    follow up 3 Weeks, kick counts, labor precautions   U/s with MFM as scheduled

## 2020-10-16 ENCOUNTER — LAB VISIT (OUTPATIENT)
Dept: LAB | Facility: HOSPITAL | Age: 35
End: 2020-10-16
Attending: INTERNAL MEDICINE
Payer: COMMERCIAL

## 2020-10-16 DIAGNOSIS — E03.9 HYPOTHYROIDISM, UNSPECIFIED TYPE: ICD-10-CM

## 2020-10-16 LAB — TSH SERPL DL<=0.005 MIU/L-ACNC: 1.84 UIU/ML (ref 0.4–4)

## 2020-10-16 PROCEDURE — 84443 ASSAY THYROID STIM HORMONE: CPT

## 2020-10-16 PROCEDURE — 36415 COLL VENOUS BLD VENIPUNCTURE: CPT | Mod: PO

## 2020-10-27 ENCOUNTER — PATIENT MESSAGE (OUTPATIENT)
Dept: ENDOCRINOLOGY | Facility: CLINIC | Age: 35
End: 2020-10-27

## 2020-10-30 ENCOUNTER — INITIAL CONSULT (OUTPATIENT)
Dept: MATERNAL FETAL MEDICINE | Facility: CLINIC | Age: 35
End: 2020-10-30
Payer: COMMERCIAL

## 2020-10-30 ENCOUNTER — IMMUNIZATION (OUTPATIENT)
Dept: FAMILY MEDICINE | Facility: CLINIC | Age: 35
End: 2020-10-30
Payer: COMMERCIAL

## 2020-10-30 ENCOUNTER — PROCEDURE VISIT (OUTPATIENT)
Dept: MATERNAL FETAL MEDICINE | Facility: CLINIC | Age: 35
End: 2020-10-30
Payer: COMMERCIAL

## 2020-10-30 ENCOUNTER — ROUTINE PRENATAL (OUTPATIENT)
Dept: OBSTETRICS AND GYNECOLOGY | Facility: CLINIC | Age: 35
End: 2020-10-30
Payer: COMMERCIAL

## 2020-10-30 VITALS
SYSTOLIC BLOOD PRESSURE: 126 MMHG | BODY MASS INDEX: 33.99 KG/M2 | WEIGHT: 185.88 LBS | DIASTOLIC BLOOD PRESSURE: 74 MMHG

## 2020-10-30 VITALS
WEIGHT: 186.06 LBS | BODY MASS INDEX: 34.03 KG/M2 | DIASTOLIC BLOOD PRESSURE: 84 MMHG | SYSTOLIC BLOOD PRESSURE: 120 MMHG

## 2020-10-30 DIAGNOSIS — E03.9 HYPOTHYROIDISM AFFECTING PREGNANCY IN SECOND TRIMESTER: ICD-10-CM

## 2020-10-30 DIAGNOSIS — Z36.89 ENCOUNTER FOR ULTRASOUND TO CHECK FETAL GROWTH: ICD-10-CM

## 2020-10-30 DIAGNOSIS — O35.EXX0 FETAL RENAL ANOMALY, SINGLE GESTATION: Primary | ICD-10-CM

## 2020-10-30 DIAGNOSIS — Z36.89 ENCOUNTER FOR ULTRASOUND TO ASSESS FETAL GROWTH: Primary | ICD-10-CM

## 2020-10-30 DIAGNOSIS — Z3A.25 25 WEEKS GESTATION OF PREGNANCY: Primary | ICD-10-CM

## 2020-10-30 DIAGNOSIS — O99.282 HYPOTHYROIDISM AFFECTING PREGNANCY IN SECOND TRIMESTER: ICD-10-CM

## 2020-10-30 PROCEDURE — 76816 PR  US,PREGNANT UTERUS,F/U,TRANSABD APP: ICD-10-PCS | Mod: S$GLB,,, | Performed by: OBSTETRICS & GYNECOLOGY

## 2020-10-30 PROCEDURE — 99999 PR PBB SHADOW E&M-EST. PATIENT-LVL II: CPT | Mod: PBBFAC,,, | Performed by: OBSTETRICS & GYNECOLOGY

## 2020-10-30 PROCEDURE — 99999 PR PBB SHADOW E&M-EST. PATIENT-LVL III: ICD-10-PCS | Mod: PBBFAC,,, | Performed by: OBSTETRICS & GYNECOLOGY

## 2020-10-30 PROCEDURE — 3008F PR BODY MASS INDEX (BMI) DOCUMENTED: ICD-10-PCS | Mod: CPTII,S$GLB,, | Performed by: OBSTETRICS & GYNECOLOGY

## 2020-10-30 PROCEDURE — 90686 IIV4 VACC NO PRSV 0.5 ML IM: CPT | Mod: S$GLB,,, | Performed by: INTERNAL MEDICINE

## 2020-10-30 PROCEDURE — 0502F SUBSEQUENT PRENATAL CARE: CPT | Mod: CPTII,S$GLB,, | Performed by: OBSTETRICS & GYNECOLOGY

## 2020-10-30 PROCEDURE — 99999 PR PBB SHADOW E&M-EST. PATIENT-LVL II: ICD-10-PCS | Mod: PBBFAC,,, | Performed by: OBSTETRICS & GYNECOLOGY

## 2020-10-30 PROCEDURE — 99213 OFFICE O/P EST LOW 20 MIN: CPT | Mod: 25,S$GLB,, | Performed by: OBSTETRICS & GYNECOLOGY

## 2020-10-30 PROCEDURE — 76816 OB US FOLLOW-UP PER FETUS: CPT | Mod: S$GLB,,, | Performed by: OBSTETRICS & GYNECOLOGY

## 2020-10-30 PROCEDURE — 90686 FLU VACCINE (QUAD) GREATER THAN OR EQUAL TO 3YO PRESERVATIVE FREE IM: ICD-10-PCS | Mod: S$GLB,,, | Performed by: INTERNAL MEDICINE

## 2020-10-30 PROCEDURE — 90471 FLU VACCINE (QUAD) GREATER THAN OR EQUAL TO 3YO PRESERVATIVE FREE IM: ICD-10-PCS | Mod: S$GLB,,, | Performed by: INTERNAL MEDICINE

## 2020-10-30 PROCEDURE — 99999 PR PBB SHADOW E&M-EST. PATIENT-LVL III: CPT | Mod: PBBFAC,,, | Performed by: OBSTETRICS & GYNECOLOGY

## 2020-10-30 PROCEDURE — 90471 IMMUNIZATION ADMIN: CPT | Mod: S$GLB,,, | Performed by: INTERNAL MEDICINE

## 2020-10-30 PROCEDURE — 99213 PR OFFICE/OUTPT VISIT, EST, LEVL III, 20-29 MIN: ICD-10-PCS | Mod: 25,S$GLB,, | Performed by: OBSTETRICS & GYNECOLOGY

## 2020-10-30 PROCEDURE — 0502F PR SUBSEQUENT PRENATAL CARE: ICD-10-PCS | Mod: CPTII,S$GLB,, | Performed by: OBSTETRICS & GYNECOLOGY

## 2020-10-30 PROCEDURE — 3008F BODY MASS INDEX DOCD: CPT | Mod: CPTII,S$GLB,, | Performed by: OBSTETRICS & GYNECOLOGY

## 2020-10-30 NOTE — PROGRESS NOTES
Complaints today: none, Good fetal movements reported, No Bleeding or pains    /74   Wt 84.3 kg (185 lb 13.6 oz)   LMP 2020 (Exact Date)   BMI 33.99 kg/m²     34 y.o., at 25w6d by Estimated Date of Delivery: 21  Patient Active Problem List   Diagnosis    Hypothyroidism    Attention deficit disorder    Depression with anxiety     OB History    Para Term  AB Living   2 1 1     1   SAB TAB Ectopic Multiple Live Births           1      # Outcome Date GA Lbr Aman/2nd Weight Sex Delivery Anes PTL Lv   2 Current            1 Term 13 39w0d  3.459 kg (7 lb 10 oz) U CS-LTranv  N SHRADDHA      Birth Comments: System Generated. Please review and update pregnancy details.      Complications: Failure to Progress in First Stage       Dating reviewed    Allergies and problem list reviewed and updated    Medical and surgical history reviewed    Prenatal labs reviewed and updated    Physical Exam:  ABD: soft, gravid, nontender    Assessment:  25 weeks, doing well.      Plan:   glucola  Schedule repeat c/section at 39 weeks   follow up 3 Weeks, bleeding/pain precautions ,  kick counts, labor precautions

## 2020-10-30 NOTE — PROGRESS NOTES
Please see imaging tab for Viewpoint study performed today          Est Pt Visit:  Hypothyroidism, depression/anxiety, attention deficit disorder, prior son born with micropenis  No interval change in history since LV:   Past medical history:  Attention deficit disorder, depression with anxiety, hypothyroidism  Past surgical history:  Prior  section  Past obstetric history   delivery for failure to progress 7 lb 10 oz male infant  Social history:  Denies tobacco, drug, or alcohol use.  Former smoker.  Genetic screening:  Cell free DNA screening low risk   Family history:  Paternal cousin with cleft palate, 2nd cousin with trisomy 21  Allergies:  Penicillin  Medications:  Prenatal vitamin, Synthroid 150 mcg daily              1.  Hypothyroidism   Ms. Connell was diagnosed with hypothyroidism in  and is currently on Synthroid 150 mcg. Her last TSH was 1.668 on 10/16/20. She currently denies symptoms of thyroid dysfunction.  Counseling:   See initial consult note    Recommendations at [facility]:  1. Continue synthroid therapy and titrate doses as needed by 25 mcg.  Repeat thyroid studies every 4 weeks until TSH normalizes.   2.  Once well controlled, TSH levels can be monitored q trimester. Many women require and increase in their medication dose during pregnancy.  Generally , patients should be discharged postpartum on their prepregnancy dose of synthroid.      2.  Depression/Anxiety:  The patient has suffered from anxiety and depression for her entire life.  She denies suicidal or homicidal ideation.  The patient is currently on no antidepressant agent.     3.  ADD:  The patient was diagnosed with  attention deficit disorder in 2018.  She stopped taking Adderall upon recognition of the pregnancy.  Off medication, the patient has some difficulty in focusing but is able to function well.  She works as an  and is currently in school to receive her master's degree.     4.  Prior son  born with micropenis:  The patient reports that her son was diagnosed with micropenis shortly after birth.  He has been seen by specialists at [facility].  Generally, micropenis  is diagnosed after birth when the stretched penile length falls more than 2.5 standard deviations below the mean for age.  Micropenis may occur as  an isolated finding or in association with other conditions, especially those involving hormone deficiencies.  Some of the potential factors causing micropenis due to hormonal deficiencies are hypogonadotropic hypogonadism resulting from an endocrine anomaly of the hypothalamic-pituitary-gonadal axis and androgen insensitivity syndrome with inadequate penile growth despite normal or excessive  androgen secretion.  Other potential causes of hypothalamic dysfunction such as Kallman's syndrome or Prader Willi syndrome are in the differential as well.  The most common cause of micropenis is failure of the hypothalamus to secrete gonadotropins.  Often times, hCG can be used to induce puberty instead of testosterone.       Both the patient and her  have seen specialists at [facility] and the patient has been counseled that her son will likely need HCG when he gets closer to the time of puberty.  Depending upon his exact etiology for micro penis, there may be some increased risk for this child as well.  The external genitalia appear normal on today's ultrasound examination, but micro penis is a diagnosis which is made after delivery when assessment of the penile length can be made.    Targeted ultrasound: normal at prior visit, mild RPD oted 5.4, 5.0 mm,  pt aware of limitations of US in diagnosing micropenis   cfDNA: low risk   Followup US today: see below      5.  Bilateral renal pelvis dilatation:  mild RPD on targeted US, cfDNA screening low risk.  Follow up US today as below.    Counseling: see prior visit note    US 10/30/2020:  Normal interval growth and amniotic fluid  volume, mild RPD (5.7 and 5.1 mm), anatomy not well seen previously appears normal         At today's visit, I spent approximately 15 min in face-to-face interview with the patient and greater than 50% of this time was spent in counseling alone.  Our ultrasound findings were reviewed, and questions addressed.  The US today is reassuring.  Return visit 4 weeks to reassess growth and fetal kidneys.  Pt here today with her mother      Donna Rubio MD

## 2020-11-02 ENCOUNTER — TELEPHONE (OUTPATIENT)
Dept: OBSTETRICS AND GYNECOLOGY | Facility: CLINIC | Age: 35
End: 2020-11-02

## 2020-11-06 ENCOUNTER — LAB VISIT (OUTPATIENT)
Dept: LAB | Facility: HOSPITAL | Age: 35
End: 2020-11-06
Attending: OBSTETRICS & GYNECOLOGY
Payer: COMMERCIAL

## 2020-11-06 ENCOUNTER — PATIENT MESSAGE (OUTPATIENT)
Dept: OBSTETRICS AND GYNECOLOGY | Facility: CLINIC | Age: 35
End: 2020-11-06

## 2020-11-06 ENCOUNTER — TELEPHONE (OUTPATIENT)
Dept: OBSTETRICS AND GYNECOLOGY | Facility: CLINIC | Age: 35
End: 2020-11-06

## 2020-11-06 DIAGNOSIS — Z3A.25 25 WEEKS GESTATION OF PREGNANCY: ICD-10-CM

## 2020-11-06 LAB — GLUCOSE SERPL-MCNC: 135 MG/DL (ref 70–140)

## 2020-11-06 PROCEDURE — 82950 GLUCOSE TEST: CPT

## 2020-11-06 PROCEDURE — 36415 COLL VENOUS BLD VENIPUNCTURE: CPT | Mod: PO

## 2020-11-06 NOTE — TELEPHONE ENCOUNTER
Explained to patient that she will need to be 39 weeks to schedule c/section. Discussed date options with Dr. Harris and patient will discuss further with Dr. Harris.     ----- Message from Aleida Hernandez sent at 2020 10:48 AM CST -----  Regarding: requesting call back  Contact: Patient  Type: Needs Medical Advice    Who Called:  Patient    Best Call Back Number: [phone]    Additional Information: Patient states that her  was scheduled without discussing with her prior. The date does not work for her & requesting call back to change the date.     Scheduled  but they need it to be - states this was approved by

## 2020-11-11 ENCOUNTER — ROUTINE PRENATAL (OUTPATIENT)
Dept: OBSTETRICS AND GYNECOLOGY | Facility: CLINIC | Age: 35
End: 2020-11-11
Payer: COMMERCIAL

## 2020-11-11 ENCOUNTER — PATIENT MESSAGE (OUTPATIENT)
Dept: OBSTETRICS AND GYNECOLOGY | Facility: CLINIC | Age: 35
End: 2020-11-11

## 2020-11-11 VITALS
SYSTOLIC BLOOD PRESSURE: 122 MMHG | BODY MASS INDEX: 34.07 KG/M2 | WEIGHT: 186.31 LBS | DIASTOLIC BLOOD PRESSURE: 78 MMHG

## 2020-11-11 DIAGNOSIS — Z3A.27 27 WEEKS GESTATION OF PREGNANCY: Primary | ICD-10-CM

## 2020-11-11 PROBLEM — O36.8190 DECREASED FETAL MOVEMENT: Status: ACTIVE | Noted: 2020-11-11

## 2020-11-11 PROCEDURE — 0502F SUBSEQUENT PRENATAL CARE: CPT | Mod: CPTII,S$GLB,, | Performed by: OBSTETRICS & GYNECOLOGY

## 2020-11-11 PROCEDURE — 99999 PR PBB SHADOW E&M-EST. PATIENT-LVL II: ICD-10-PCS | Mod: PBBFAC,,, | Performed by: OBSTETRICS & GYNECOLOGY

## 2020-11-11 PROCEDURE — 0502F PR SUBSEQUENT PRENATAL CARE: ICD-10-PCS | Mod: CPTII,S$GLB,, | Performed by: OBSTETRICS & GYNECOLOGY

## 2020-11-11 PROCEDURE — 99999 PR PBB SHADOW E&M-EST. PATIENT-LVL II: CPT | Mod: PBBFAC,,, | Performed by: OBSTETRICS & GYNECOLOGY

## 2020-11-11 NOTE — PROGRESS NOTES
Complaints today:decreased movments,No Bleeding or pains    /78   Wt 84.5 kg (186 lb 4.6 oz)   LMP 2020 (Exact Date)   BMI 34.07 kg/m²     35 y.o., at 27w4d by Estimated Date of Delivery: 21  Patient Active Problem List   Diagnosis    Hypothyroidism    Attention deficit disorder    Depression with anxiety    Decreased fetal movement     OB History    Para Term  AB Living   2 1 1     1   SAB TAB Ectopic Multiple Live Births           1      # Outcome Date GA Lbr Aman/2nd Weight Sex Delivery Anes PTL Lv   2 Current            1 Term 13 39w0d  3.459 kg (7 lb 10 oz) U CS-LTranv  N SHRADDHA      Birth Comments: System Generated. Please review and update pregnancy details.      Complications: Failure to Progress in First Stage       Dating reviewed    Allergies and problem list reviewed and updated    Medical and surgical history reviewed    Prenatal labs reviewed and updated    Physical Exam:  ABD: soft, gravid, nontender    Assessment:  27 weeks, doing well  Equivocal NST    Plan:   To L&D for BPP   follow up 1Weeks,  kick counts, labor precautions

## 2020-11-13 ENCOUNTER — LAB VISIT (OUTPATIENT)
Dept: LAB | Facility: HOSPITAL | Age: 35
End: 2020-11-13
Attending: INTERNAL MEDICINE
Payer: COMMERCIAL

## 2020-11-13 DIAGNOSIS — E03.9 HYPOTHYROIDISM, UNSPECIFIED TYPE: ICD-10-CM

## 2020-11-13 LAB
T4 FREE SERPL-MCNC: 1.07 NG/DL (ref 0.71–1.51)
TSH SERPL DL<=0.005 MIU/L-ACNC: 2.09 UIU/ML (ref 0.4–4)

## 2020-11-13 PROCEDURE — 84443 ASSAY THYROID STIM HORMONE: CPT

## 2020-11-13 PROCEDURE — 36415 COLL VENOUS BLD VENIPUNCTURE: CPT | Mod: PO

## 2020-11-13 PROCEDURE — 84439 ASSAY OF FREE THYROXINE: CPT

## 2020-11-17 ENCOUNTER — PATIENT OUTREACH (OUTPATIENT)
Dept: ADMINISTRATIVE | Facility: OTHER | Age: 35
End: 2020-11-17

## 2020-11-17 NOTE — PROGRESS NOTES
Health Maintenance Due   Topic Date Due    Hepatitis C Screening  1985    TETANUS VACCINE  03/17/2018     Updates were requested from care everywhere.  Chart was reviewed for overdue Proactive [facility] Encounters (ELLIOT) topics (CRS, Breast Cancer Screening, Eye exam)  Health Maintenance has been updated.  LINKS immunization registry triggered.  LINKS not responding.

## 2020-11-18 ENCOUNTER — PATIENT MESSAGE (OUTPATIENT)
Dept: ENDOCRINOLOGY | Facility: CLINIC | Age: 35
End: 2020-11-18

## 2020-11-18 ENCOUNTER — OFFICE VISIT (OUTPATIENT)
Dept: ENDOCRINOLOGY | Facility: CLINIC | Age: 35
End: 2020-11-18
Payer: COMMERCIAL

## 2020-11-18 DIAGNOSIS — E03.9 HYPOTHYROIDISM, UNSPECIFIED TYPE: Primary | ICD-10-CM

## 2020-11-18 PROCEDURE — 99213 OFFICE O/P EST LOW 20 MIN: CPT | Mod: 95,,, | Performed by: INTERNAL MEDICINE

## 2020-11-18 PROCEDURE — 99213 PR OFFICE/OUTPT VISIT, EST, LEVL III, 20-29 MIN: ICD-10-PCS | Mod: 95,,, | Performed by: INTERNAL MEDICINE

## 2020-11-18 NOTE — PROGRESS NOTES
The patient location is: [place]    Visit type: audiovisual    Face to Face time with patient: 12  16 minutes of total time spent on the encounter, which includes face to face time and non-face to face time preparing to see the patient (eg, review of tests), Obtaining and/or reviewing separately obtained history, Documenting clinical information in the electronic or other health record, Independently interpreting results (not separately reported) and communicating results to the patient/family/caregiver, or Care coordination (not separately reported).         Each patient to whom he or she provides medical services by telemedicine is:  (1) informed of the relationship between the physician and patient and the respective role of any other health care provider with respect to management of the patient; and (2) notified that he or she may decline to receive medical services by telemedicine and may withdraw from such care at any time.         CHIEF COMPLAINT: Hypothyroidism  35 y.o.  female here for followup. Currently on Synthroid 150 mcg. On brandname.  Currently 28.5 weeks pregnant. Seeing [facility] due to a concern over kidney imaging on fetal Ultrasound. Last Ultrasound appeared ok and scheduled to see them 1 more time. No N/V. No palpitations. No tremors.  scheduled .           PAST MEDICAL HISTORY: Hypothyroid. PCOS.     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         ROS:   Constitutional: weight increased per patient  Eyes: No recent visual changes  ENT: No dysphagia  Cardiovascular: No CP  Respiratory: No SOB.   Gastrointestinal: Denies recent bowel disturbances  GenitoUrinary - No dysuria  Skin: No new skin rash  Neurologic: No focal neurologic complaints     PE: virtual  visit        Results for SUMAYA PHILLIPS (MRN [id number]) as of 11/18/2020 16:02   Ref. Range 11/13/2020 08:36   TSH Latest Ref Range: 0.400 - 4.000 uIU/mL 2.092   Free T4 Latest Ref Range: 0.71 - 1.51 ng/dL 1.07       A/P  1. Hypothyroidism- TPO+. Discussed thyroid and pregnancy. Goal TSH < 2.5. Symptomatically doing well. Continue current Tx,     2. PCOS- being followed by OB.     FOLLOWUP  AVS

## 2020-11-20 ENCOUNTER — PATIENT MESSAGE (OUTPATIENT)
Dept: MATERNAL FETAL MEDICINE | Facility: CLINIC | Age: 35
End: 2020-11-20

## 2020-11-20 ENCOUNTER — ROUTINE PRENATAL (OUTPATIENT)
Dept: OBSTETRICS AND GYNECOLOGY | Facility: CLINIC | Age: 35
End: 2020-11-20
Payer: COMMERCIAL

## 2020-11-20 VITALS
BODY MASS INDEX: 34.44 KG/M2 | WEIGHT: 188.25 LBS | SYSTOLIC BLOOD PRESSURE: 110 MMHG | DIASTOLIC BLOOD PRESSURE: 70 MMHG

## 2020-11-20 DIAGNOSIS — Z3A.28 28 WEEKS GESTATION OF PREGNANCY: Primary | ICD-10-CM

## 2020-11-20 LAB
BILIRUB SERPL-MCNC: NORMAL MG/DL
BLOOD URINE, POC: NORMAL
CLARITY, POC UA: NORMAL
COLOR, POC UA: NORMAL
GLUCOSE UR QL STRIP: 1
KETONES UR QL STRIP: NORMAL
LEUKOCYTE ESTERASE URINE, POC: NORMAL
NITRITE, POC UA: NORMAL
PH, POC UA: 5
PROTEIN, POC: NORMAL
SPECIFIC GRAVITY, POC UA: NORMAL
UROBILINOGEN, POC UA: NORMAL

## 2020-11-20 PROCEDURE — 0502F SUBSEQUENT PRENATAL CARE: CPT | Mod: CPTII,S$GLB,, | Performed by: OBSTETRICS & GYNECOLOGY

## 2020-11-20 PROCEDURE — 99999 PR PBB SHADOW E&M-EST. PATIENT-LVL II: CPT | Mod: PBBFAC,,, | Performed by: OBSTETRICS & GYNECOLOGY

## 2020-11-20 PROCEDURE — 99999 PR PBB SHADOW E&M-EST. PATIENT-LVL II: ICD-10-PCS | Mod: PBBFAC,,, | Performed by: OBSTETRICS & GYNECOLOGY

## 2020-11-20 PROCEDURE — 0502F PR SUBSEQUENT PRENATAL CARE: ICD-10-PCS | Mod: CPTII,S$GLB,, | Performed by: OBSTETRICS & GYNECOLOGY

## 2020-11-21 ENCOUNTER — PATIENT MESSAGE (OUTPATIENT)
Dept: ENDOCRINOLOGY | Facility: CLINIC | Age: 35
End: 2020-11-21

## 2020-12-04 ENCOUNTER — ROUTINE PRENATAL (OUTPATIENT)
Dept: OBSTETRICS AND GYNECOLOGY | Facility: CLINIC | Age: 35
End: 2020-12-04
Payer: COMMERCIAL

## 2020-12-04 VITALS — DIASTOLIC BLOOD PRESSURE: 80 MMHG | WEIGHT: 190.5 LBS | BODY MASS INDEX: 34.84 KG/M2 | SYSTOLIC BLOOD PRESSURE: 110 MMHG

## 2020-12-04 DIAGNOSIS — Z3A.30 30 WEEKS GESTATION OF PREGNANCY: Primary | ICD-10-CM

## 2020-12-04 PROCEDURE — 99999 PR PBB SHADOW E&M-EST. PATIENT-LVL II: ICD-10-PCS | Mod: PBBFAC,,, | Performed by: OBSTETRICS & GYNECOLOGY

## 2020-12-04 PROCEDURE — 0502F SUBSEQUENT PRENATAL CARE: CPT | Mod: CPTII,S$GLB,, | Performed by: OBSTETRICS & GYNECOLOGY

## 2020-12-04 PROCEDURE — 99999 PR PBB SHADOW E&M-EST. PATIENT-LVL II: CPT | Mod: PBBFAC,,, | Performed by: OBSTETRICS & GYNECOLOGY

## 2020-12-04 PROCEDURE — 0502F PR SUBSEQUENT PRENATAL CARE: ICD-10-PCS | Mod: CPTII,S$GLB,, | Performed by: OBSTETRICS & GYNECOLOGY

## 2020-12-04 NOTE — PROGRESS NOTES
Complaints today: none, Good fetal movements reported, No Bleeding or pains    /80   Wt 86.4 kg (190 lb 7.6 oz)   LMP 2020 (Exact Date)   BMI 34.84 kg/m²     35 y.o., at 30w6d by Estimated Date of Delivery: 21  Patient Active Problem List   Diagnosis    Hypothyroidism    Attention deficit disorder    Depression with anxiety    Decreased fetal movement     OB History    Para Term  AB Living   2 1 1     1   SAB TAB Ectopic Multiple Live Births           1      # Outcome Date GA Lbr Aman/2nd Weight Sex Delivery Anes PTL Lv   2 Current            1 Term 13 39w0d  3.459 kg (7 lb 10 oz) U CS-LTranv  N SHRADDHA      Birth Comments: System Generated. Please review and update pregnancy details.      Complications: Failure to Progress in First Stage       Dating reviewed    Allergies and problem list reviewed and updated    Medical and surgical history reviewed    Prenatal labs reviewed and updated    Physical Exam:  ABD: soft, gravid, nontender    Assessment:  30 weeks,doing well    Plan:    follow up 2 Weeks, kick counts, labor precautions

## 2020-12-08 ENCOUNTER — PROCEDURE VISIT (OUTPATIENT)
Dept: MATERNAL FETAL MEDICINE | Facility: CLINIC | Age: 35
End: 2020-12-08
Payer: COMMERCIAL

## 2020-12-08 ENCOUNTER — PATIENT MESSAGE (OUTPATIENT)
Dept: OBSTETRICS AND GYNECOLOGY | Facility: CLINIC | Age: 35
End: 2020-12-08

## 2020-12-08 ENCOUNTER — OFFICE VISIT (OUTPATIENT)
Dept: MATERNAL FETAL MEDICINE | Facility: CLINIC | Age: 35
End: 2020-12-08
Payer: COMMERCIAL

## 2020-12-08 VITALS
BODY MASS INDEX: 35.08 KG/M2 | DIASTOLIC BLOOD PRESSURE: 74 MMHG | WEIGHT: 191.81 LBS | SYSTOLIC BLOOD PRESSURE: 112 MMHG

## 2020-12-08 DIAGNOSIS — O35.EXX0 ENCOUNTER FOR REPEAT ULTRASOUND OF FETAL PYELECTASIS, ANTEPARTUM, NOT APPLICABLE OR UNSPECIFIED FETUS: Primary | ICD-10-CM

## 2020-12-08 DIAGNOSIS — Z36.89 ENCOUNTER FOR ULTRASOUND TO ASSESS FETAL GROWTH: ICD-10-CM

## 2020-12-08 PROCEDURE — 3008F PR BODY MASS INDEX (BMI) DOCUMENTED: ICD-10-PCS | Mod: CPTII,S$GLB,, | Performed by: OBSTETRICS & GYNECOLOGY

## 2020-12-08 PROCEDURE — 76816 OB US FOLLOW-UP PER FETUS: CPT | Mod: S$GLB,,, | Performed by: OBSTETRICS & GYNECOLOGY

## 2020-12-08 PROCEDURE — 76816 PR  US,PREGNANT UTERUS,F/U,TRANSABD APP: ICD-10-PCS | Mod: S$GLB,,, | Performed by: OBSTETRICS & GYNECOLOGY

## 2020-12-08 PROCEDURE — 99213 OFFICE O/P EST LOW 20 MIN: CPT | Mod: 25,S$GLB,, | Performed by: OBSTETRICS & GYNECOLOGY

## 2020-12-08 PROCEDURE — 99213 PR OFFICE/OUTPT VISIT, EST, LEVL III, 20-29 MIN: ICD-10-PCS | Mod: 25,S$GLB,, | Performed by: OBSTETRICS & GYNECOLOGY

## 2020-12-08 PROCEDURE — 3008F BODY MASS INDEX DOCD: CPT | Mod: CPTII,S$GLB,, | Performed by: OBSTETRICS & GYNECOLOGY

## 2020-12-08 PROCEDURE — 1126F AMNT PAIN NOTED NONE PRSNT: CPT | Mod: S$GLB,,, | Performed by: OBSTETRICS & GYNECOLOGY

## 2020-12-08 PROCEDURE — 99999 PR PBB SHADOW E&M-EST. PATIENT-LVL II: ICD-10-PCS | Mod: PBBFAC,,, | Performed by: OBSTETRICS & GYNECOLOGY

## 2020-12-08 PROCEDURE — 99999 PR PBB SHADOW E&M-EST. PATIENT-LVL II: CPT | Mod: PBBFAC,,, | Performed by: OBSTETRICS & GYNECOLOGY

## 2020-12-08 PROCEDURE — 1126F PR PAIN SEVERITY QUANTIFIED, NO PAIN PRESENT: ICD-10-PCS | Mod: S$GLB,,, | Performed by: OBSTETRICS & GYNECOLOGY

## 2020-12-08 NOTE — PROGRESS NOTES
Please see imaging tab for Viewpoint study performed today            Est Pt Visit:  Hypothyroidism, depression/anxiety, attention deficit disorder, prior son born with micropenis  No interval change in history since LV:   Past medical history:  Attention deficit disorder, depression with anxiety, hypothyroidism  Past surgical history:  Prior  section  Past obstetric history   delivery for failure to progress 7 lb 10 oz male infant  Social history:  Denies tobacco, drug, or alcohol use.  Former smoker.  Genetic screening:  Cell free DNA screening low risk   Family history:  Paternal cousin with cleft palate, 2nd cousin with trisomy 21  Allergies:  Penicillin  Medications:  Prenatal vitamin, Synthroid 150 mcg daily              1.  Hypothyroidism   Ms. Connell was diagnosed with hypothyroidism in  and is currently on Synthroid 150 mcg. Her last TSH was 1.668 on 10/16/20. She currently denies symptoms of thyroid dysfunction.  Counseling:   See initial consult note  20: FT4  1.07, TSH 2.09      Recommendations at [facility]:  1. Continue synthroid therapy and titrate doses as needed by 25 mcg.  Repeat thyroid studies every 4 weeks until TSH normalizes.   2.  Once well controlled, TSH levels can be monitored q trimester. Many women require and increase in their medication dose during pregnancy.  Generally , patients should be discharged postpartum on their prepregnancy dose of synthroid.      2.  Depression/Anxiety:  The patient has suffered from anxiety and depression for her entire life.  She denies suicidal or homicidal ideation.  The patient is currently on no antidepressant agent.  No interval changes.      3.  ADD:  The patient was diagnosed with  attention deficit disorder in 2018.  She stopped taking Adderall upon recognition of the pregnancy.  Off medication, the patient has some difficulty in focusing but is able to function well.  She works as an  and is currently in  school to receive her master's degree.  No change since LV.      4.  Prior son born with micropenis:  The patient reports that her son was diagnosed with micropenis shortly after birth.  He has been seen by specialists at [facility].  Generally, micropenis  is diagnosed after birth when the stretched penile length falls more than 2.5 standard deviations below the mean for age.  Micropenis may occur as  an isolated finding or in association with other conditions, especially those involving hormone deficiencies.  Some of the potential factors causing micropenis due to hormonal deficiencies are hypogonadotropic hypogonadism resulting from an endocrine anomaly of the hypothalamic-pituitary-gonadal axis and androgen insensitivity syndrome with inadequate penile growth despite normal or excessive  androgen secretion.  Other potential causes of hypothalamic dysfunction such as Kallman's syndrome or Prader Willi syndrome are in the differential as well.  The most common cause of micropenis is failure of the hypothalamus to secrete gonadotropins.  Often times, hCG can be used to induce puberty instead of testosterone.       Both the patient and her  have seen specialists at [facility] and the patient has been counseled that her son will likely need HCG when he gets closer to the time of puberty.  Depending upon his exact etiology for micro penis, there may be some increased risk for this child as well.  The external genitalia appear normal on today's ultrasound examination, but micro penis is a diagnosis which is made after delivery when assessment of the penile length can be made.     Targeted ultrasound: normal at prior visit, mild RPD oted 5.4, 5.0 mm,  pt aware of limitations of US in diagnosing micropenis   cfDNA: low risk   Followup US 10/30/20:  Normal interval growth and amniotic fluid volume, mild RPD (5.7 and 5.1 mm), anatomy not well seen previously appears normal   12/8/20 US:   See  below     5.  Bilateral renal pelvis dilatation:  mild RPD on targeted US, cfDNA screening low risk.  Follow up US today as below.    Counseling: see prior visit note     US 10/30/2020:  Normal interval growth and amniotic fluid volume, mild RPD (5.7 and 5.1 mm), anatomy not well seen previously appears normal   US 12/8/20:  Normal interval growth and amniotic fluid volume, mild RPD is stable L 7 mm, R 4.1 mm.  No evidence of significant obstructive process.     Recommend:  Pt to notify her Pediatrician of finding, no further follow up visits are needed.         At today's visit, I spent approximately 15 min in face-to-face interview with the patient and greater than 50% of this time was spent in counseling alone.  Our ultrasound findings were reviewed, and questions addressed.       Donna Rubio MD

## 2020-12-11 ENCOUNTER — LAB VISIT (OUTPATIENT)
Dept: LAB | Facility: HOSPITAL | Age: 35
End: 2020-12-11
Attending: INTERNAL MEDICINE
Payer: COMMERCIAL

## 2020-12-11 DIAGNOSIS — E03.9 HYPOTHYROIDISM, UNSPECIFIED TYPE: ICD-10-CM

## 2020-12-11 LAB
T4 FREE SERPL-MCNC: 1.12 NG/DL (ref 0.71–1.51)
TSH SERPL DL<=0.005 MIU/L-ACNC: 1.6 UIU/ML (ref 0.4–4)

## 2020-12-11 PROCEDURE — 84443 ASSAY THYROID STIM HORMONE: CPT

## 2020-12-11 PROCEDURE — 84439 ASSAY OF FREE THYROXINE: CPT

## 2020-12-11 PROCEDURE — 36415 COLL VENOUS BLD VENIPUNCTURE: CPT | Mod: PO

## 2020-12-18 ENCOUNTER — PATIENT MESSAGE (OUTPATIENT)
Dept: OBSTETRICS AND GYNECOLOGY | Facility: CLINIC | Age: 35
End: 2020-12-18

## 2020-12-18 ENCOUNTER — ROUTINE PRENATAL (OUTPATIENT)
Dept: OBSTETRICS AND GYNECOLOGY | Facility: CLINIC | Age: 35
End: 2020-12-18
Payer: COMMERCIAL

## 2020-12-18 ENCOUNTER — PATIENT OUTREACH (OUTPATIENT)
Dept: ADMINISTRATIVE | Facility: OTHER | Age: 35
End: 2020-12-18

## 2020-12-18 VITALS
WEIGHT: 191.56 LBS | SYSTOLIC BLOOD PRESSURE: 120 MMHG | DIASTOLIC BLOOD PRESSURE: 74 MMHG | BODY MASS INDEX: 35.04 KG/M2

## 2020-12-18 DIAGNOSIS — Z3A.32 32 WEEKS GESTATION OF PREGNANCY: Primary | ICD-10-CM

## 2020-12-18 LAB
BILIRUB SERPL-MCNC: NORMAL MG/DL
BLOOD URINE, POC: NORMAL
CLARITY, POC UA: NORMAL
COLOR, POC UA: NORMAL
GLUCOSE UR QL STRIP: 2
KETONES UR QL STRIP: 2
LEUKOCYTE ESTERASE URINE, POC: NORMAL
NITRITE, POC UA: NORMAL
PH, POC UA: 5
PROTEIN, POC: NORMAL
SPECIFIC GRAVITY, POC UA: NORMAL
UROBILINOGEN, POC UA: NORMAL

## 2020-12-18 PROCEDURE — 0502F PR SUBSEQUENT PRENATAL CARE: ICD-10-PCS | Mod: CPTII,S$GLB,, | Performed by: OBSTETRICS & GYNECOLOGY

## 2020-12-18 PROCEDURE — 99999 PR PBB SHADOW E&M-EST. PATIENT-LVL III: CPT | Mod: PBBFAC,,, | Performed by: OBSTETRICS & GYNECOLOGY

## 2020-12-18 PROCEDURE — 99999 PR PBB SHADOW E&M-EST. PATIENT-LVL III: ICD-10-PCS | Mod: PBBFAC,,, | Performed by: OBSTETRICS & GYNECOLOGY

## 2020-12-18 PROCEDURE — 0502F SUBSEQUENT PRENATAL CARE: CPT | Mod: CPTII,S$GLB,, | Performed by: OBSTETRICS & GYNECOLOGY

## 2020-12-18 NOTE — PROGRESS NOTES
Complaints today: none, Good fetal movements reported, No Bleeding or pains    /74   Wt 86.9 kg (191 lb 9.3 oz)   LMP 2020 (Exact Date)   BMI 35.04 kg/m²     35 y.o., at 32w6d by Estimated Date of Delivery: 21  Patient Active Problem List   Diagnosis    Hypothyroidism    Attention deficit disorder    Depression with anxiety    Decreased fetal movement     OB History    Para Term  AB Living   2 1 1     1   SAB TAB Ectopic Multiple Live Births           1      # Outcome Date GA Lbr Aman/2nd Weight Sex Delivery Anes PTL Lv   2 Current            1 Term 13 39w0d  3.459 kg (7 lb 10 oz) U CS-LTranv  N SHRADDHA      Birth Comments: System Generated. Please review and update pregnancy details.      Complications: Failure to Progress in First Stage       Dating reviewed    Allergies and problem list reviewed and updated    Medical and surgical history reviewed    Prenatal labs reviewed and updated    Physical Exam:  ABD: soft, gravid, nontender    Assessment:  32 weeks,doing well    Plan:    follow up 2-3 Weeks, bleeding/pain precautions,  kick counts, labor precautions   U/s and GBS at follow up

## 2020-12-19 NOTE — PROGRESS NOTES
Health Maintenance Due   Topic Date Due    Hepatitis C Screening  1985    TETANUS VACCINE  03/17/2018     Updates were requested from care everywhere.  Chart was reviewed for overdue Proactive [facility] Encounters (ELLIOT) topics (CRS, Breast Cancer Screening, Eye exam)  Health Maintenance has been updated.  LINKS immunization registry triggered.  Immunizations were reconciled.

## 2020-12-22 ENCOUNTER — OFFICE VISIT (OUTPATIENT)
Dept: DERMATOLOGY | Facility: CLINIC | Age: 35
End: 2020-12-22
Payer: COMMERCIAL

## 2020-12-22 VITALS — HEIGHT: 62 IN | BODY MASS INDEX: 35.15 KG/M2 | WEIGHT: 191 LBS

## 2020-12-22 DIAGNOSIS — D48.5 NEOPLASM OF UNCERTAIN BEHAVIOR OF SKIN: Primary | ICD-10-CM

## 2020-12-22 PROCEDURE — 99999 PR PBB SHADOW E&M-EST. PATIENT-LVL III: CPT | Mod: PBBFAC,,, | Performed by: DERMATOLOGY

## 2020-12-22 PROCEDURE — 99499 NO LOS: ICD-10-PCS | Mod: S$GLB,,, | Performed by: DERMATOLOGY

## 2020-12-22 PROCEDURE — 99999 PR PBB SHADOW E&M-EST. PATIENT-LVL III: ICD-10-PCS | Mod: PBBFAC,,, | Performed by: DERMATOLOGY

## 2020-12-22 PROCEDURE — 99499 UNLISTED E&M SERVICE: CPT | Mod: S$GLB,,, | Performed by: DERMATOLOGY

## 2020-12-22 PROCEDURE — 11102 PR TANGENTIAL BIOPSY, SKIN, SINGLE LESION: ICD-10-PCS | Mod: S$GLB,,, | Performed by: DERMATOLOGY

## 2020-12-22 PROCEDURE — 1126F AMNT PAIN NOTED NONE PRSNT: CPT | Mod: S$GLB,,, | Performed by: DERMATOLOGY

## 2020-12-22 PROCEDURE — 1126F PR PAIN SEVERITY QUANTIFIED, NO PAIN PRESENT: ICD-10-PCS | Mod: S$GLB,,, | Performed by: DERMATOLOGY

## 2020-12-22 PROCEDURE — 88305 TISSUE EXAM BY PATHOLOGIST: ICD-10-PCS | Mod: 26,,, | Performed by: PATHOLOGY

## 2020-12-22 PROCEDURE — 88305 TISSUE EXAM BY PATHOLOGIST: CPT | Performed by: PATHOLOGY

## 2020-12-22 PROCEDURE — 3008F PR BODY MASS INDEX (BMI) DOCUMENTED: ICD-10-PCS | Mod: CPTII,S$GLB,, | Performed by: DERMATOLOGY

## 2020-12-22 PROCEDURE — 11102 TANGNTL BX SKIN SINGLE LES: CPT | Mod: S$GLB,,, | Performed by: DERMATOLOGY

## 2020-12-22 PROCEDURE — 3008F BODY MASS INDEX DOCD: CPT | Mod: CPTII,S$GLB,, | Performed by: DERMATOLOGY

## 2020-12-22 PROCEDURE — 88305 TISSUE EXAM BY PATHOLOGIST: CPT | Mod: 26,,, | Performed by: PATHOLOGY

## 2020-12-22 NOTE — PROGRESS NOTES
Subjective:       Patient ID:  Monika Connell is a 35 y.o. female who presents for   Chief Complaint   Patient presents with    Lesion     35 yr old F present for lesion to L upper back. She feels that this is an irritated mole that she picks at when she is stressed. This lesion is not painful, does not bleed or itch. She has not used any OTC or prescription medications to treat.     C/o Lesion to L upper back X 1 years.  Change in size? Yes, bigger since pregnant  Change in Shape? no   Change in color? no   Itches? no    Spontaneous bleeding? no   Currently treating? no     no Phx of NMSC.  no Fhx of melanoma.    Past Medical History:  No date: ADHD (attention deficit hyperactivity disorder)  No date: Allergy  No date: Anxiety  No date: Depression  No date: Thyroid disease      Review of Systems   Constitutional: Negative for fever, chills and fatigue.   Eyes: Negative for itching, eye watering and eye irritation.   Respiratory: Negative for cough and shortness of breath.    Gastrointestinal: Negative for nausea, vomiting and diarrhea.   Skin: Positive for activity-related sunscreen use. Negative for itching, rash, dry skin and daily sunscreen use.        Objective:    Physical Exam   Constitutional: She appears well-developed and well-nourished. No distress.   Neurological: She is alert and oriented to person, place, and time. She is not disoriented.   Psychiatric: She has a normal mood and affect.   Skin:                 Diagram Legend     Erythematous scaling macule/papule c/w actinic keratosis       Vascular papule c/w angioma      Pigmented verrucoid papule/plaque c/w seborrheic keratosis      Yellow umbilicated papule c/w sebaceous hyperplasia      Irregularly shaped tan macule c/w lentigo     1-2 mm smooth white papules consistent with Milia      Movable subcutaneous cyst with punctum c/w epidermal inclusion cyst      Subcutaneous movable cyst c/w pilar cyst      Firm pink to brown papule c/w  dermatofibroma      Pedunculated fleshy papule(s) c/w skin tag(s)      Evenly pigmented macule c/w junctional nevus     Mildly variegated pigmented, slightly irregular-bordered macule c/w mildly atypical nevus      Flesh colored to evenly pigmented papule c/w intradermal nevus       Pink pearly papule/plaque c/w basal cell carcinoma      Erythematous hyperkeratotic cursted plaque c/w SCC      Surgical scar with no sign of skin cancer recurrence      Open and closed comedones      Inflammatory papules and pustules      Verrucoid papule consistent consistent with wart     Erythematous eczematous patches and plaques     Dystrophic onycholytic nail with subungual debris c/w onychomycosis     Umbilicated papule    Erythematous-base heme-crusted tan verrucoid plaque consistent with inflamed seborrheic keratosis     Erythematous Silvery Scaling Plaque c/w Psoriasis     See annotation      Assessment / Plan:      Pathology Orders:     Normal Orders This Visit    Specimen to Pathology, Dermatology     Comments:    Number of Specimens:->1  ------------------------->-------------------------  Spec 1 Procedure:->Biopsy  Spec 1 Clinical Impression:->angioma  Spec 1 Source:->left upper back    Questions:    Procedure Type: Dermatology and skin neoplasms    Number of Specimens: 1    ------------------------: -------------------------    Spec 1 Procedure: Biopsy    Spec 1 Clinical Impression: angioma    Spec 1 Source: left upper back        Neoplasm of uncertain behavior of skin  -     Specimen to Pathology, Dermatology    Shave biopsy procedure note:    Shave biopsy performed after verbal consent including risk of infection, scar, recurrence, need for additional treatment of site. Area prepped with alcohol, anesthetized with approximately 1.0cc of 1% lidocaine without epinephrine. Lesional tissue shaved with razor blade. Hemostasis achieved with application of aluminum chloride followed by hyfrecation. No complications. Dressing  applied. Wound care explained.             No follow-ups on file.

## 2020-12-29 ENCOUNTER — PATIENT MESSAGE (OUTPATIENT)
Dept: OBSTETRICS AND GYNECOLOGY | Facility: CLINIC | Age: 35
End: 2020-12-29

## 2020-12-29 LAB
FINAL PATHOLOGIC DIAGNOSIS: NORMAL
GROSS: NORMAL

## 2021-01-05 ENCOUNTER — PATIENT MESSAGE (OUTPATIENT)
Dept: FAMILY MEDICINE | Facility: CLINIC | Age: 36
End: 2021-01-05

## 2021-01-05 ENCOUNTER — PATIENT MESSAGE (OUTPATIENT)
Dept: OBSTETRICS AND GYNECOLOGY | Facility: CLINIC | Age: 36
End: 2021-01-05

## 2021-01-05 DIAGNOSIS — Z23 NEED FOR TDAP VACCINATION: Primary | ICD-10-CM

## 2021-01-06 ENCOUNTER — CLINICAL SUPPORT (OUTPATIENT)
Dept: FAMILY MEDICINE | Facility: CLINIC | Age: 36
End: 2021-01-06
Payer: COMMERCIAL

## 2021-01-06 DIAGNOSIS — Z23 NEED FOR TDAP VACCINATION: ICD-10-CM

## 2021-01-06 PROCEDURE — 90715 TDAP VACCINE GREATER THAN OR EQUAL TO 7YO IM: ICD-10-PCS | Mod: S$GLB,,, | Performed by: INTERNAL MEDICINE

## 2021-01-06 PROCEDURE — 90471 IMMUNIZATION ADMIN: CPT | Mod: S$GLB,,, | Performed by: INTERNAL MEDICINE

## 2021-01-06 PROCEDURE — 90471 TDAP VACCINE GREATER THAN OR EQUAL TO 7YO IM: ICD-10-PCS | Mod: S$GLB,,, | Performed by: INTERNAL MEDICINE

## 2021-01-06 PROCEDURE — 99999 PR PBB SHADOW E&M-EST. PATIENT-LVL I: ICD-10-PCS | Mod: PBBFAC,,,

## 2021-01-06 PROCEDURE — 90715 TDAP VACCINE 7 YRS/> IM: CPT | Mod: S$GLB,,, | Performed by: INTERNAL MEDICINE

## 2021-01-06 PROCEDURE — 99999 PR PBB SHADOW E&M-EST. PATIENT-LVL I: CPT | Mod: PBBFAC,,,

## 2021-01-07 ENCOUNTER — ROUTINE PRENATAL (OUTPATIENT)
Dept: OBSTETRICS AND GYNECOLOGY | Facility: CLINIC | Age: 36
End: 2021-01-07
Payer: COMMERCIAL

## 2021-01-07 ENCOUNTER — PATIENT MESSAGE (OUTPATIENT)
Dept: OBSTETRICS AND GYNECOLOGY | Facility: CLINIC | Age: 36
End: 2021-01-07

## 2021-01-07 ENCOUNTER — PATIENT MESSAGE (OUTPATIENT)
Dept: ADMINISTRATIVE | Facility: OTHER | Age: 36
End: 2021-01-07

## 2021-01-07 ENCOUNTER — HOSPITAL ENCOUNTER (OUTPATIENT)
Dept: RADIOLOGY | Facility: HOSPITAL | Age: 36
Discharge: HOME | End: 2021-01-07
Attending: OBSTETRICS & GYNECOLOGY
Payer: COMMERCIAL

## 2021-01-07 VITALS
DIASTOLIC BLOOD PRESSURE: 74 MMHG | WEIGHT: 201.06 LBS | BODY MASS INDEX: 36.77 KG/M2 | SYSTOLIC BLOOD PRESSURE: 122 MMHG

## 2021-01-07 DIAGNOSIS — Z3A.32 32 WEEKS GESTATION OF PREGNANCY: ICD-10-CM

## 2021-01-07 DIAGNOSIS — Z3A.35 35 WEEKS GESTATION OF PREGNANCY: Primary | ICD-10-CM

## 2021-01-07 PROCEDURE — 0502F SUBSEQUENT PRENATAL CARE: CPT | Mod: CPTII,S$GLB,, | Performed by: OBSTETRICS & GYNECOLOGY

## 2021-01-07 PROCEDURE — 76805 OB US >/= 14 WKS SNGL FETUS: CPT | Mod: 26,,, | Performed by: RADIOLOGY

## 2021-01-07 PROCEDURE — 87081 CULTURE SCREEN ONLY: CPT

## 2021-01-07 PROCEDURE — 76805 US OB 14+ WEEKS, TRANSABDOM, SINGLE GESTATION: ICD-10-PCS | Mod: 26,,, | Performed by: RADIOLOGY

## 2021-01-07 PROCEDURE — 0502F PR SUBSEQUENT PRENATAL CARE: ICD-10-PCS | Mod: CPTII,S$GLB,, | Performed by: OBSTETRICS & GYNECOLOGY

## 2021-01-07 PROCEDURE — 99999 PR PBB SHADOW E&M-EST. PATIENT-LVL II: ICD-10-PCS | Mod: PBBFAC,,, | Performed by: OBSTETRICS & GYNECOLOGY

## 2021-01-07 PROCEDURE — 99999 PR PBB SHADOW E&M-EST. PATIENT-LVL II: CPT | Mod: PBBFAC,,, | Performed by: OBSTETRICS & GYNECOLOGY

## 2021-01-07 PROCEDURE — 76805 OB US >/= 14 WKS SNGL FETUS: CPT | Mod: TC,PN

## 2021-01-08 ENCOUNTER — LAB VISIT (OUTPATIENT)
Dept: LAB | Facility: HOSPITAL | Age: 36
End: 2021-01-08
Attending: INTERNAL MEDICINE
Payer: COMMERCIAL

## 2021-01-08 ENCOUNTER — PATIENT MESSAGE (OUTPATIENT)
Dept: OBSTETRICS AND GYNECOLOGY | Facility: CLINIC | Age: 36
End: 2021-01-08

## 2021-01-08 DIAGNOSIS — E03.9 HYPOTHYROIDISM, UNSPECIFIED TYPE: ICD-10-CM

## 2021-01-08 LAB
T4 FREE SERPL-MCNC: 1.05 NG/DL (ref 0.71–1.51)
TSH SERPL DL<=0.005 MIU/L-ACNC: 3.48 UIU/ML (ref 0.4–4)

## 2021-01-08 PROCEDURE — 84443 ASSAY THYROID STIM HORMONE: CPT

## 2021-01-08 PROCEDURE — 84439 ASSAY OF FREE THYROXINE: CPT

## 2021-01-08 PROCEDURE — 36415 COLL VENOUS BLD VENIPUNCTURE: CPT | Mod: PO

## 2021-01-11 LAB — BACTERIA SPEC AEROBE CULT: NORMAL

## 2021-01-12 ENCOUNTER — PATIENT MESSAGE (OUTPATIENT)
Dept: ENDOCRINOLOGY | Facility: CLINIC | Age: 36
End: 2021-01-12

## 2021-01-12 ENCOUNTER — OFFICE VISIT (OUTPATIENT)
Dept: DERMATOLOGY | Facility: CLINIC | Age: 36
End: 2021-01-12
Payer: COMMERCIAL

## 2021-01-12 VITALS — RESPIRATION RATE: 20 BRPM | BODY MASS INDEX: 36.77 KG/M2 | HEIGHT: 62 IN

## 2021-01-12 DIAGNOSIS — L81.9 POSTINFLAMMATORY PIGMENTARY CHANGES: Primary | ICD-10-CM

## 2021-01-12 DIAGNOSIS — E03.9 HYPOTHYROIDISM, UNSPECIFIED TYPE: Primary | ICD-10-CM

## 2021-01-12 DIAGNOSIS — D22.9 MULTIPLE BENIGN NEVI: ICD-10-CM

## 2021-01-12 DIAGNOSIS — L91.8 INFLAMED SKIN TAG: ICD-10-CM

## 2021-01-12 PROCEDURE — 99213 OFFICE O/P EST LOW 20 MIN: CPT | Mod: 25,S$GLB,, | Performed by: DERMATOLOGY

## 2021-01-12 PROCEDURE — 11200 PR REMOVAL OF SKIN TAGS, UP TO 15: ICD-10-PCS | Mod: S$GLB,,, | Performed by: DERMATOLOGY

## 2021-01-12 PROCEDURE — 11200 RMVL SKIN TAGS UP TO&INC 15: CPT | Mod: S$GLB,,, | Performed by: DERMATOLOGY

## 2021-01-12 PROCEDURE — 1126F AMNT PAIN NOTED NONE PRSNT: CPT | Mod: S$GLB,,, | Performed by: DERMATOLOGY

## 2021-01-12 PROCEDURE — 99213 PR OFFICE/OUTPT VISIT, EST, LEVL III, 20-29 MIN: ICD-10-PCS | Mod: 25,S$GLB,, | Performed by: DERMATOLOGY

## 2021-01-12 PROCEDURE — 3008F BODY MASS INDEX DOCD: CPT | Mod: CPTII,S$GLB,, | Performed by: DERMATOLOGY

## 2021-01-12 PROCEDURE — 99999 PR PBB SHADOW E&M-EST. PATIENT-LVL III: CPT | Mod: PBBFAC,,, | Performed by: DERMATOLOGY

## 2021-01-12 PROCEDURE — 1126F PR PAIN SEVERITY QUANTIFIED, NO PAIN PRESENT: ICD-10-PCS | Mod: S$GLB,,, | Performed by: DERMATOLOGY

## 2021-01-12 PROCEDURE — 3008F PR BODY MASS INDEX (BMI) DOCUMENTED: ICD-10-PCS | Mod: CPTII,S$GLB,, | Performed by: DERMATOLOGY

## 2021-01-12 PROCEDURE — 99999 PR PBB SHADOW E&M-EST. PATIENT-LVL III: ICD-10-PCS | Mod: PBBFAC,,, | Performed by: DERMATOLOGY

## 2021-01-12 RX ORDER — LEVOTHYROXINE SODIUM 150 UG/1
TABLET ORAL
Qty: 35 TABLET | Refills: 11 | Status: SHIPPED | OUTPATIENT
Start: 2021-01-12 | End: 2021-01-15

## 2021-01-14 ENCOUNTER — PATIENT MESSAGE (OUTPATIENT)
Dept: OBSTETRICS AND GYNECOLOGY | Facility: CLINIC | Age: 36
End: 2021-01-14

## 2021-01-15 ENCOUNTER — TELEPHONE (OUTPATIENT)
Dept: ENDOCRINOLOGY | Facility: CLINIC | Age: 36
End: 2021-01-15

## 2021-01-15 ENCOUNTER — ROUTINE PRENATAL (OUTPATIENT)
Dept: OBSTETRICS AND GYNECOLOGY | Facility: CLINIC | Age: 36
End: 2021-01-15
Payer: COMMERCIAL

## 2021-01-15 VITALS
WEIGHT: 198.63 LBS | BODY MASS INDEX: 36.33 KG/M2 | DIASTOLIC BLOOD PRESSURE: 70 MMHG | SYSTOLIC BLOOD PRESSURE: 130 MMHG

## 2021-01-15 DIAGNOSIS — Z3A.36 36 WEEKS GESTATION OF PREGNANCY: Primary | ICD-10-CM

## 2021-01-15 DIAGNOSIS — E03.9 HYPOTHYROIDISM, UNSPECIFIED TYPE: Primary | ICD-10-CM

## 2021-01-15 LAB
BILIRUB SERPL-MCNC: NEGATIVE MG/DL
BLOOD URINE, POC: NEGATIVE
CLARITY, POC UA: CLEAR
COLOR, POC UA: YELLOW
GLUCOSE UR QL STRIP: NORMAL
KETONES UR QL STRIP: NEGATIVE
LEUKOCYTE ESTERASE URINE, POC: NEGATIVE
NITRITE, POC UA: NEGATIVE
PH, POC UA: 8
PROTEIN, POC: NORMAL
SPECIFIC GRAVITY, POC UA: NORMAL
UROBILINOGEN, POC UA: NEGATIVE

## 2021-01-15 PROCEDURE — 0502F SUBSEQUENT PRENATAL CARE: CPT | Mod: CPTII,S$GLB,, | Performed by: OBSTETRICS & GYNECOLOGY

## 2021-01-15 PROCEDURE — 99999 PR PBB SHADOW E&M-EST. PATIENT-LVL II: ICD-10-PCS | Mod: PBBFAC,,, | Performed by: OBSTETRICS & GYNECOLOGY

## 2021-01-15 PROCEDURE — 0502F PR SUBSEQUENT PRENATAL CARE: ICD-10-PCS | Mod: CPTII,S$GLB,, | Performed by: OBSTETRICS & GYNECOLOGY

## 2021-01-15 PROCEDURE — 99999 PR PBB SHADOW E&M-EST. PATIENT-LVL II: CPT | Mod: PBBFAC,,, | Performed by: OBSTETRICS & GYNECOLOGY

## 2021-01-15 RX ORDER — LEVOTHYROXINE SODIUM 175 UG/1
175 TABLET ORAL
Qty: 30 TABLET | Refills: 11 | Status: SHIPPED | OUTPATIENT
Start: 2021-01-15 | End: 2021-01-15

## 2021-01-15 RX ORDER — LEVOTHYROXINE SODIUM 150 UG/1
TABLET ORAL
Qty: 35 TABLET | Refills: 11 | Status: SHIPPED | OUTPATIENT
Start: 2021-01-15 | End: 2021-01-27 | Stop reason: SDUPTHER

## 2021-01-21 ENCOUNTER — ROUTINE PRENATAL (OUTPATIENT)
Dept: OBSTETRICS AND GYNECOLOGY | Facility: CLINIC | Age: 36
End: 2021-01-21
Payer: COMMERCIAL

## 2021-01-21 VITALS — BODY MASS INDEX: 37.1 KG/M2 | WEIGHT: 202.81 LBS | DIASTOLIC BLOOD PRESSURE: 78 MMHG | SYSTOLIC BLOOD PRESSURE: 124 MMHG

## 2021-01-21 DIAGNOSIS — Z3A.37 37 WEEKS GESTATION OF PREGNANCY: Primary | ICD-10-CM

## 2021-01-21 LAB
BILIRUB SERPL-MCNC: NORMAL MG/DL
BLOOD URINE, POC: NORMAL
CLARITY, POC UA: NORMAL
COLOR, POC UA: NORMAL
GLUCOSE UR QL STRIP: 4
KETONES UR QL STRIP: 1
LEUKOCYTE ESTERASE URINE, POC: NORMAL
NITRITE, POC UA: NORMAL
PH, POC UA: 6
PROTEIN, POC: NORMAL
SPECIFIC GRAVITY, POC UA: NORMAL
UROBILINOGEN, POC UA: 1

## 2021-01-21 PROCEDURE — 0502F PR SUBSEQUENT PRENATAL CARE: ICD-10-PCS | Mod: CPTII,S$GLB,, | Performed by: OBSTETRICS & GYNECOLOGY

## 2021-01-21 PROCEDURE — 99999 PR PBB SHADOW E&M-EST. PATIENT-LVL II: ICD-10-PCS | Mod: PBBFAC,,, | Performed by: OBSTETRICS & GYNECOLOGY

## 2021-01-21 PROCEDURE — 0502F SUBSEQUENT PRENATAL CARE: CPT | Mod: CPTII,S$GLB,, | Performed by: OBSTETRICS & GYNECOLOGY

## 2021-01-21 PROCEDURE — 99999 PR PBB SHADOW E&M-EST. PATIENT-LVL II: CPT | Mod: PBBFAC,,, | Performed by: OBSTETRICS & GYNECOLOGY

## 2021-01-26 ENCOUNTER — PATIENT MESSAGE (OUTPATIENT)
Dept: ENDOCRINOLOGY | Facility: CLINIC | Age: 36
End: 2021-01-26

## 2021-01-27 ENCOUNTER — PATIENT MESSAGE (OUTPATIENT)
Dept: ENDOCRINOLOGY | Facility: CLINIC | Age: 36
End: 2021-01-27

## 2021-01-27 ENCOUNTER — TELEPHONE (OUTPATIENT)
Dept: ENDOCRINOLOGY | Facility: CLINIC | Age: 36
End: 2021-01-27

## 2021-01-27 ENCOUNTER — PATIENT MESSAGE (OUTPATIENT)
Dept: OBSTETRICS AND GYNECOLOGY | Facility: CLINIC | Age: 36
End: 2021-01-27

## 2021-01-27 RX ORDER — LEVOTHYROXINE SODIUM 150 MCG
TABLET ORAL
Qty: 35 TABLET | Refills: 11 | Status: SHIPPED | OUTPATIENT
Start: 2021-01-27 | End: 2021-03-05

## 2021-01-28 ENCOUNTER — TELEPHONE (OUTPATIENT)
Dept: ENDOCRINOLOGY | Facility: CLINIC | Age: 36
End: 2021-01-28

## 2021-01-28 ENCOUNTER — PATIENT MESSAGE (OUTPATIENT)
Dept: ENDOCRINOLOGY | Facility: CLINIC | Age: 36
End: 2021-01-28

## 2021-01-28 NOTE — PROGRESS NOTES
CHIEF COMPLAINT: Hypothyroidism  33-year-old female here for followup. Currently on Synthroid 112 mcg. On brandname. Has some dry skin. + Hair loss. Has irregular menstrual cycle. No hirsutism.           PAST MEDICAL HISTORY: Hypothyroid. PCOS.     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         ROS:   Constitutional: weight stable since May  Eyes: No recent visual changes  ENT: No dysphagia  Cardiovascular: Denies current anginal symptoms  Respiratory: Denies current respiratory difficulty  Gastrointestinal: Denies recent bowel disturbances  GenitoUrinary - No dysuria  Skin: No new skin rash  Neurologic: No focal neurologic complaints     PE:  GENERAL: Well developed, well nourished  NECK: Supple neck, normal thyroid  LYMPHATIC: No cervical or supraclavicular lymphadenopathy  CARDIOVASCULAR: Normal heart sounds, no pedal edema  RESPIRATORY: Normal effort, clear to auscultation   No tremors        Results for SUMAYA PHILLIPS (MRN [id number]) as of 7/5/2019 14:02   Ref. Range 6/3/2019 09:10   TSH Latest Ref Range: 0.400 - 4.000 uIU/mL 0.921               A/P  1. Hypothyroidism- TPO+. Discussed that unsure if hair loss is thyroid related with normal TSH. Discussed consider seeing derm to r/o other causes.     2. PCOS- being followed by OB.     FOLLOWUP  F/U 1 year with TSH, FT4     Yes

## 2021-01-29 ENCOUNTER — ROUTINE PRENATAL (OUTPATIENT)
Dept: OBSTETRICS AND GYNECOLOGY | Facility: CLINIC | Age: 36
End: 2021-01-29
Payer: COMMERCIAL

## 2021-01-29 VITALS — WEIGHT: 203.5 LBS | BODY MASS INDEX: 37.22 KG/M2 | SYSTOLIC BLOOD PRESSURE: 124 MMHG | DIASTOLIC BLOOD PRESSURE: 80 MMHG

## 2021-01-29 DIAGNOSIS — Z3A.38 38 WEEKS GESTATION OF PREGNANCY: Primary | ICD-10-CM

## 2021-01-29 PROCEDURE — 0502F PR SUBSEQUENT PRENATAL CARE: ICD-10-PCS | Mod: CPTII,S$GLB,, | Performed by: OBSTETRICS & GYNECOLOGY

## 2021-01-29 PROCEDURE — 99999 PR PBB SHADOW E&M-EST. PATIENT-LVL II: CPT | Mod: PBBFAC,,, | Performed by: OBSTETRICS & GYNECOLOGY

## 2021-01-29 PROCEDURE — 0502F SUBSEQUENT PRENATAL CARE: CPT | Mod: CPTII,S$GLB,, | Performed by: OBSTETRICS & GYNECOLOGY

## 2021-01-29 PROCEDURE — 99999 PR PBB SHADOW E&M-EST. PATIENT-LVL II: ICD-10-PCS | Mod: PBBFAC,,, | Performed by: OBSTETRICS & GYNECOLOGY

## 2021-02-01 PROBLEM — O34.211 MATERNAL CARE DUE TO LOW TRANSVERSE UTERINE SCAR FROM PREVIOUS CESAREAN DELIVERY: Status: ACTIVE | Noted: 2021-02-01

## 2021-02-04 ENCOUNTER — PATIENT MESSAGE (OUTPATIENT)
Dept: FAMILY MEDICINE | Facility: CLINIC | Age: 36
End: 2021-02-04

## 2021-02-04 DIAGNOSIS — J34.89 NASAL VESTIBULITIS: Primary | ICD-10-CM

## 2021-02-04 RX ORDER — MUPIROCIN 20 MG/G
OINTMENT TOPICAL 2 TIMES DAILY
Qty: 30 G | Refills: 1 | Status: SHIPPED | OUTPATIENT
Start: 2021-02-04 | End: 2021-02-14

## 2021-02-05 ENCOUNTER — PATIENT MESSAGE (OUTPATIENT)
Dept: FAMILY MEDICINE | Facility: CLINIC | Age: 36
End: 2021-02-05

## 2021-02-05 DIAGNOSIS — J34.89 NASAL VESTIBULITIS: Primary | ICD-10-CM

## 2021-02-05 RX ORDER — CLINDAMYCIN HYDROCHLORIDE 150 MG/1
450 CAPSULE ORAL EVERY 8 HOURS
Qty: 63 CAPSULE | Refills: 0 | Status: SHIPPED | OUTPATIENT
Start: 2021-02-05 | End: 2021-02-12

## 2021-02-06 ENCOUNTER — PATIENT MESSAGE (OUTPATIENT)
Dept: ENDOCRINOLOGY | Facility: CLINIC | Age: 36
End: 2021-02-06

## 2021-02-06 ENCOUNTER — PATIENT MESSAGE (OUTPATIENT)
Dept: OBSTETRICS AND GYNECOLOGY | Facility: CLINIC | Age: 36
End: 2021-02-06

## 2021-02-07 ENCOUNTER — PATIENT MESSAGE (OUTPATIENT)
Dept: OBSTETRICS AND GYNECOLOGY | Facility: CLINIC | Age: 36
End: 2021-02-07

## 2021-02-08 ENCOUNTER — PATIENT MESSAGE (OUTPATIENT)
Dept: FAMILY MEDICINE | Facility: CLINIC | Age: 36
End: 2021-02-08

## 2021-02-08 ENCOUNTER — PATIENT MESSAGE (OUTPATIENT)
Dept: OBSTETRICS AND GYNECOLOGY | Facility: CLINIC | Age: 36
End: 2021-02-08

## 2021-02-08 DIAGNOSIS — L30.9 DERMATITIS: Primary | ICD-10-CM

## 2021-02-08 RX ORDER — TRIAMCINOLONE ACETONIDE 1 MG/G
CREAM TOPICAL 2 TIMES DAILY
Qty: 30 G | Refills: 0 | Status: SHIPPED | OUTPATIENT
Start: 2021-02-08 | End: 2022-04-27

## 2021-02-12 ENCOUNTER — POSTPARTUM VISIT (OUTPATIENT)
Dept: OBSTETRICS AND GYNECOLOGY | Facility: CLINIC | Age: 36
End: 2021-02-12
Payer: COMMERCIAL

## 2021-02-12 VITALS
DIASTOLIC BLOOD PRESSURE: 86 MMHG | SYSTOLIC BLOOD PRESSURE: 128 MMHG | BODY MASS INDEX: 33.39 KG/M2 | WEIGHT: 181.44 LBS | HEIGHT: 62 IN

## 2021-02-12 PROCEDURE — 99999 PR PBB SHADOW E&M-EST. PATIENT-LVL III: CPT | Mod: PBBFAC,,, | Performed by: OBSTETRICS & GYNECOLOGY

## 2021-02-12 PROCEDURE — 99999 PR PBB SHADOW E&M-EST. PATIENT-LVL III: ICD-10-PCS | Mod: PBBFAC,,, | Performed by: OBSTETRICS & GYNECOLOGY

## 2021-02-12 RX ORDER — SERTRALINE HYDROCHLORIDE 50 MG/1
TABLET, FILM COATED ORAL
Qty: 35 TABLET | Refills: 0 | Status: SHIPPED | OUTPATIENT
Start: 2021-02-12 | End: 2021-03-12 | Stop reason: SDUPTHER

## 2021-02-26 ENCOUNTER — PATIENT MESSAGE (OUTPATIENT)
Dept: ENDOCRINOLOGY | Facility: CLINIC | Age: 36
End: 2021-02-26

## 2021-03-04 ENCOUNTER — LAB VISIT (OUTPATIENT)
Dept: LAB | Facility: HOSPITAL | Age: 36
End: 2021-03-04
Attending: INTERNAL MEDICINE
Payer: COMMERCIAL

## 2021-03-04 DIAGNOSIS — E03.9 HYPOTHYROIDISM, UNSPECIFIED TYPE: ICD-10-CM

## 2021-03-04 LAB
T4 FREE SERPL-MCNC: 1.75 NG/DL (ref 0.71–1.51)
TSH SERPL DL<=0.005 MIU/L-ACNC: 0.04 UIU/ML (ref 0.4–4)

## 2021-03-04 PROCEDURE — 84443 ASSAY THYROID STIM HORMONE: CPT | Performed by: INTERNAL MEDICINE

## 2021-03-04 PROCEDURE — 36415 COLL VENOUS BLD VENIPUNCTURE: CPT | Mod: PO | Performed by: INTERNAL MEDICINE

## 2021-03-04 PROCEDURE — 84439 ASSAY OF FREE THYROXINE: CPT | Performed by: INTERNAL MEDICINE

## 2021-03-05 ENCOUNTER — TELEPHONE (OUTPATIENT)
Dept: ENDOCRINOLOGY | Facility: CLINIC | Age: 36
End: 2021-03-05

## 2021-03-05 DIAGNOSIS — E03.9 HYPOTHYROIDISM, UNSPECIFIED TYPE: Primary | ICD-10-CM

## 2021-03-05 RX ORDER — LEVOTHYROXINE SODIUM 137 UG/1
137 TABLET ORAL
Qty: 30 TABLET | Refills: 11 | Status: SHIPPED | OUTPATIENT
Start: 2021-03-05 | End: 2021-04-19

## 2021-03-11 ENCOUNTER — IMMUNIZATION (OUTPATIENT)
Dept: FAMILY MEDICINE | Facility: CLINIC | Age: 36
End: 2021-03-11

## 2021-03-11 DIAGNOSIS — Z23 NEED FOR VACCINATION: Primary | ICD-10-CM

## 2021-03-11 PROCEDURE — 0001A COVID-19, MRNA, LNP-S, PF, 30 MCG/0.3 ML DOSE VACCINE: CPT | Mod: CV19,S$GLB,, | Performed by: FAMILY MEDICINE

## 2021-03-11 PROCEDURE — 91300 COVID-19, MRNA, LNP-S, PF, 30 MCG/0.3 ML DOSE VACCINE: ICD-10-PCS | Mod: S$GLB,,, | Performed by: FAMILY MEDICINE

## 2021-03-11 PROCEDURE — 91300 COVID-19, MRNA, LNP-S, PF, 30 MCG/0.3 ML DOSE VACCINE: CPT | Mod: S$GLB,,, | Performed by: FAMILY MEDICINE

## 2021-03-11 PROCEDURE — 0001A COVID-19, MRNA, LNP-S, PF, 30 MCG/0.3 ML DOSE VACCINE: ICD-10-PCS | Mod: CV19,S$GLB,, | Performed by: FAMILY MEDICINE

## 2021-03-12 ENCOUNTER — POSTPARTUM VISIT (OUTPATIENT)
Dept: OBSTETRICS AND GYNECOLOGY | Facility: CLINIC | Age: 36
End: 2021-03-12
Payer: COMMERCIAL

## 2021-03-12 VITALS
HEIGHT: 62 IN | BODY MASS INDEX: 32.33 KG/M2 | WEIGHT: 175.69 LBS | SYSTOLIC BLOOD PRESSURE: 118 MMHG | DIASTOLIC BLOOD PRESSURE: 80 MMHG

## 2021-03-12 PROCEDURE — 99999 PR PBB SHADOW E&M-EST. PATIENT-LVL III: CPT | Mod: PBBFAC,,, | Performed by: OBSTETRICS & GYNECOLOGY

## 2021-03-12 PROCEDURE — 99999 PR PBB SHADOW E&M-EST. PATIENT-LVL III: ICD-10-PCS | Mod: PBBFAC,,, | Performed by: OBSTETRICS & GYNECOLOGY

## 2021-03-12 RX ORDER — SERTRALINE HYDROCHLORIDE 50 MG/1
TABLET, FILM COATED ORAL
Qty: 90 TABLET | Refills: 1 | Status: SHIPPED | OUTPATIENT
Start: 2021-03-12 | End: 2022-05-10

## 2021-03-25 ENCOUNTER — PATIENT MESSAGE (OUTPATIENT)
Dept: ADMINISTRATIVE | Facility: HOSPITAL | Age: 36
End: 2021-03-25

## 2021-04-01 ENCOUNTER — IMMUNIZATION (OUTPATIENT)
Dept: FAMILY MEDICINE | Facility: CLINIC | Age: 36
End: 2021-04-01
Payer: COMMERCIAL

## 2021-04-01 DIAGNOSIS — Z23 NEED FOR VACCINATION: Primary | ICD-10-CM

## 2021-04-01 PROCEDURE — 0002A COVID-19, MRNA, LNP-S, PF, 30 MCG/0.3 ML DOSE VACCINE: CPT | Mod: PBBFAC | Performed by: FAMILY MEDICINE

## 2021-04-01 PROCEDURE — 91300 COVID-19, MRNA, LNP-S, PF, 30 MCG/0.3 ML DOSE VACCINE: CPT | Mod: PBBFAC | Performed by: FAMILY MEDICINE

## 2021-04-16 ENCOUNTER — LAB VISIT (OUTPATIENT)
Dept: LAB | Facility: HOSPITAL | Age: 36
End: 2021-04-16
Attending: INTERNAL MEDICINE
Payer: COMMERCIAL

## 2021-04-16 ENCOUNTER — PATIENT MESSAGE (OUTPATIENT)
Dept: ENDOCRINOLOGY | Facility: CLINIC | Age: 36
End: 2021-04-16

## 2021-04-16 DIAGNOSIS — E03.9 HYPOTHYROIDISM, UNSPECIFIED TYPE: ICD-10-CM

## 2021-04-16 LAB
T4 FREE SERPL-MCNC: 1.69 NG/DL (ref 0.71–1.51)
TSH SERPL DL<=0.005 MIU/L-ACNC: 0.07 UIU/ML (ref 0.4–4)

## 2021-04-16 PROCEDURE — 84443 ASSAY THYROID STIM HORMONE: CPT | Performed by: INTERNAL MEDICINE

## 2021-04-16 PROCEDURE — 84439 ASSAY OF FREE THYROXINE: CPT | Performed by: INTERNAL MEDICINE

## 2021-04-16 PROCEDURE — 36415 COLL VENOUS BLD VENIPUNCTURE: CPT | Mod: PO | Performed by: INTERNAL MEDICINE

## 2021-04-19 ENCOUNTER — PATIENT MESSAGE (OUTPATIENT)
Dept: ENDOCRINOLOGY | Facility: CLINIC | Age: 36
End: 2021-04-19

## 2021-04-19 ENCOUNTER — OFFICE VISIT (OUTPATIENT)
Dept: ENDOCRINOLOGY | Facility: CLINIC | Age: 36
End: 2021-04-19
Payer: COMMERCIAL

## 2021-04-19 DIAGNOSIS — E03.9 HYPOTHYROIDISM, UNSPECIFIED TYPE: Primary | ICD-10-CM

## 2021-04-19 PROCEDURE — 99213 PR OFFICE/OUTPT VISIT, EST, LEVL III, 20-29 MIN: ICD-10-PCS | Mod: 95,,, | Performed by: INTERNAL MEDICINE

## 2021-04-19 PROCEDURE — 99213 OFFICE O/P EST LOW 20 MIN: CPT | Mod: 95,,, | Performed by: INTERNAL MEDICINE

## 2021-04-19 RX ORDER — LEVOTHYROXINE SODIUM 112 UG/1
112 TABLET ORAL
Qty: 30 TABLET | Refills: 11 | Status: SHIPPED | OUTPATIENT
Start: 2021-04-19 | End: 2021-06-03 | Stop reason: SDUPTHER

## 2021-05-08 ENCOUNTER — PATIENT MESSAGE (OUTPATIENT)
Dept: ADMINISTRATIVE | Facility: OTHER | Age: 36
End: 2021-05-08

## 2021-05-28 ENCOUNTER — LAB VISIT (OUTPATIENT)
Dept: LAB | Facility: HOSPITAL | Age: 36
End: 2021-05-28
Attending: INTERNAL MEDICINE
Payer: COMMERCIAL

## 2021-05-28 DIAGNOSIS — E03.9 HYPOTHYROIDISM, UNSPECIFIED TYPE: ICD-10-CM

## 2021-05-28 LAB — TSH SERPL DL<=0.005 MIU/L-ACNC: 3.6 UIU/ML (ref 0.4–4)

## 2021-05-28 PROCEDURE — 36415 COLL VENOUS BLD VENIPUNCTURE: CPT | Mod: PO | Performed by: INTERNAL MEDICINE

## 2021-05-28 PROCEDURE — 84443 ASSAY THYROID STIM HORMONE: CPT | Performed by: INTERNAL MEDICINE

## 2021-06-03 ENCOUNTER — PATIENT MESSAGE (OUTPATIENT)
Dept: ENDOCRINOLOGY | Facility: CLINIC | Age: 36
End: 2021-06-03

## 2021-06-03 DIAGNOSIS — E03.9 HYPOTHYROIDISM, UNSPECIFIED TYPE: Primary | ICD-10-CM

## 2021-06-03 RX ORDER — LEVOTHYROXINE SODIUM 112 UG/1
TABLET ORAL
Qty: 34 TABLET | Refills: 11 | Status: SHIPPED | OUTPATIENT
Start: 2021-06-03 | End: 2021-08-05

## 2021-07-19 ENCOUNTER — PATIENT MESSAGE (OUTPATIENT)
Dept: ENDOCRINOLOGY | Facility: CLINIC | Age: 36
End: 2021-07-19

## 2021-07-23 ENCOUNTER — OFFICE VISIT (OUTPATIENT)
Dept: OBSTETRICS AND GYNECOLOGY | Facility: CLINIC | Age: 36
End: 2021-07-23
Payer: COMMERCIAL

## 2021-07-23 ENCOUNTER — LAB VISIT (OUTPATIENT)
Dept: LAB | Facility: HOSPITAL | Age: 36
End: 2021-07-23
Payer: COMMERCIAL

## 2021-07-23 ENCOUNTER — LAB VISIT (OUTPATIENT)
Dept: LAB | Facility: HOSPITAL | Age: 36
End: 2021-07-23
Attending: OBSTETRICS & GYNECOLOGY
Payer: COMMERCIAL

## 2021-07-23 VITALS
DIASTOLIC BLOOD PRESSURE: 70 MMHG | BODY MASS INDEX: 32.74 KG/M2 | WEIGHT: 177.94 LBS | SYSTOLIC BLOOD PRESSURE: 110 MMHG | HEIGHT: 62 IN

## 2021-07-23 DIAGNOSIS — Z01.419 GYNECOLOGIC EXAM NORMAL: Primary | ICD-10-CM

## 2021-07-23 DIAGNOSIS — N94.10 DYSPAREUNIA IN FEMALE: ICD-10-CM

## 2021-07-23 DIAGNOSIS — E03.9 HYPOTHYROIDISM, UNSPECIFIED TYPE: ICD-10-CM

## 2021-07-23 LAB
ERYTHROCYTE [DISTWIDTH] IN BLOOD BY AUTOMATED COUNT: 12.3 % (ref 11.5–14.5)
HCT VFR BLD AUTO: 40.1 % (ref 37–48.5)
HGB BLD-MCNC: 13.9 G/DL (ref 12–16)
MCH RBC QN AUTO: 30.9 PG (ref 27–31)
MCHC RBC AUTO-ENTMCNC: 34.7 G/DL (ref 32–36)
MCV RBC AUTO: 89 FL (ref 82–98)
PLATELET # BLD AUTO: 314 K/UL (ref 150–450)
PMV BLD AUTO: 10 FL (ref 9.2–12.9)
RBC # BLD AUTO: 4.5 M/UL (ref 4–5.4)
WBC # BLD AUTO: 7.06 K/UL (ref 3.9–12.7)

## 2021-07-23 PROCEDURE — 99395 PREV VISIT EST AGE 18-39: CPT | Mod: S$GLB,,, | Performed by: OBSTETRICS & GYNECOLOGY

## 2021-07-23 PROCEDURE — 86039 ANTINUCLEAR ANTIBODIES (ANA): CPT | Performed by: OBSTETRICS & GYNECOLOGY

## 2021-07-23 PROCEDURE — 83001 ASSAY OF GONADOTROPIN (FSH): CPT | Performed by: OBSTETRICS & GYNECOLOGY

## 2021-07-23 PROCEDURE — 3008F BODY MASS INDEX DOCD: CPT | Mod: CPTII,S$GLB,, | Performed by: OBSTETRICS & GYNECOLOGY

## 2021-07-23 PROCEDURE — 36415 COLL VENOUS BLD VENIPUNCTURE: CPT | Mod: PO | Performed by: INTERNAL MEDICINE

## 2021-07-23 PROCEDURE — 99395 PR PREVENTIVE VISIT,EST,18-39: ICD-10-PCS | Mod: S$GLB,,, | Performed by: OBSTETRICS & GYNECOLOGY

## 2021-07-23 PROCEDURE — 88175 CYTOPATH C/V AUTO FLUID REDO: CPT | Performed by: OBSTETRICS & GYNECOLOGY

## 2021-07-23 PROCEDURE — 85027 COMPLETE CBC AUTOMATED: CPT | Performed by: OBSTETRICS & GYNECOLOGY

## 2021-07-23 PROCEDURE — 3008F PR BODY MASS INDEX (BMI) DOCUMENTED: ICD-10-PCS | Mod: CPTII,S$GLB,, | Performed by: OBSTETRICS & GYNECOLOGY

## 2021-07-23 PROCEDURE — 84443 ASSAY THYROID STIM HORMONE: CPT | Performed by: INTERNAL MEDICINE

## 2021-07-23 PROCEDURE — 99999 PR PBB SHADOW E&M-EST. PATIENT-LVL III: CPT | Mod: PBBFAC,,, | Performed by: OBSTETRICS & GYNECOLOGY

## 2021-07-23 PROCEDURE — 99999 PR PBB SHADOW E&M-EST. PATIENT-LVL III: ICD-10-PCS | Mod: PBBFAC,,, | Performed by: OBSTETRICS & GYNECOLOGY

## 2021-07-23 PROCEDURE — 84443 ASSAY THYROID STIM HORMONE: CPT | Mod: 91 | Performed by: OBSTETRICS & GYNECOLOGY

## 2021-07-23 PROCEDURE — 84439 ASSAY OF FREE THYROXINE: CPT | Performed by: OBSTETRICS & GYNECOLOGY

## 2021-07-23 PROCEDURE — 86235 NUCLEAR ANTIGEN ANTIBODY: CPT | Mod: 59 | Performed by: OBSTETRICS & GYNECOLOGY

## 2021-07-23 PROCEDURE — 36415 COLL VENOUS BLD VENIPUNCTURE: CPT | Mod: PO | Performed by: OBSTETRICS & GYNECOLOGY

## 2021-07-23 PROCEDURE — 1126F PR PAIN SEVERITY QUANTIFIED, NO PAIN PRESENT: ICD-10-PCS | Mod: CPTII,S$GLB,, | Performed by: OBSTETRICS & GYNECOLOGY

## 2021-07-23 PROCEDURE — 1126F AMNT PAIN NOTED NONE PRSNT: CPT | Mod: CPTII,S$GLB,, | Performed by: OBSTETRICS & GYNECOLOGY

## 2021-07-23 PROCEDURE — 86038 ANTINUCLEAR ANTIBODIES: CPT | Performed by: OBSTETRICS & GYNECOLOGY

## 2021-07-23 PROCEDURE — 82670 ASSAY OF TOTAL ESTRADIOL: CPT | Performed by: OBSTETRICS & GYNECOLOGY

## 2021-07-24 LAB
ESTRADIOL SERPL-MCNC: 23 PG/ML
FSH SERPL-ACNC: 8.7 MIU/ML
T4 FREE SERPL-MCNC: 1.44 NG/DL (ref 0.71–1.51)
TSH SERPL DL<=0.005 MIU/L-ACNC: 3.16 UIU/ML (ref 0.4–4)
TSH SERPL DL<=0.005 MIU/L-ACNC: 3.79 UIU/ML (ref 0.4–4)

## 2021-07-26 ENCOUNTER — PATIENT MESSAGE (OUTPATIENT)
Dept: OBSTETRICS AND GYNECOLOGY | Facility: CLINIC | Age: 36
End: 2021-07-26

## 2021-07-26 LAB
ANA PATTERN 1: NORMAL
ANA SER QL IF: POSITIVE
ANA TITR SER IF: NORMAL {TITER}

## 2021-07-27 LAB
ANTI SM ANTIBODY: 0.06 RATIO (ref 0–0.99)
ANTI SM/RNP ANTIBODY: 0.06 RATIO (ref 0–0.99)
ANTI-SM INTERPRETATION: NEGATIVE
ANTI-SM/RNP INTERPRETATION: NEGATIVE
ANTI-SSA ANTIBODY: 0.05 RATIO (ref 0–0.99)
ANTI-SSA INTERPRETATION: NEGATIVE
ANTI-SSB ANTIBODY: 0.05 RATIO (ref 0–0.99)
ANTI-SSB INTERPRETATION: NEGATIVE
DSDNA AB SER-ACNC: NORMAL [IU]/ML

## 2021-07-28 ENCOUNTER — PATIENT MESSAGE (OUTPATIENT)
Dept: ENDOCRINOLOGY | Facility: CLINIC | Age: 36
End: 2021-07-28

## 2021-07-28 DIAGNOSIS — E03.9 HYPOTHYROIDISM, UNSPECIFIED TYPE: Primary | ICD-10-CM

## 2021-07-31 LAB
FINAL PATHOLOGIC DIAGNOSIS: NORMAL
Lab: NORMAL

## 2021-08-05 RX ORDER — LEVOTHYROXINE SODIUM 125 UG/1
125 TABLET ORAL
Qty: 30 TABLET | Refills: 11 | Status: SHIPPED | OUTPATIENT
Start: 2021-08-05 | End: 2022-01-31 | Stop reason: SDUPTHER

## 2021-08-09 ENCOUNTER — PATIENT MESSAGE (OUTPATIENT)
Dept: ENDOCRINOLOGY | Facility: CLINIC | Age: 36
End: 2021-08-09

## 2021-08-09 DIAGNOSIS — E03.9 HYPOTHYROIDISM, UNSPECIFIED TYPE: Primary | ICD-10-CM

## 2021-08-09 RX ORDER — LIOTHYRONINE SODIUM 5 UG/1
5 TABLET ORAL 2 TIMES DAILY
COMMUNITY
End: 2022-01-31 | Stop reason: SDUPTHER

## 2022-01-30 ENCOUNTER — PATIENT MESSAGE (OUTPATIENT)
Dept: ENDOCRINOLOGY | Facility: CLINIC | Age: 37
End: 2022-01-30
Payer: COMMERCIAL

## 2022-01-31 RX ORDER — LIOTHYRONINE SODIUM 5 UG/1
5 TABLET ORAL 2 TIMES DAILY
Qty: 60 TABLET | Refills: 11 | Status: SHIPPED | OUTPATIENT
Start: 2022-01-31 | End: 2022-10-07

## 2022-01-31 RX ORDER — LEVOTHYROXINE SODIUM 125 UG/1
125 TABLET ORAL
Qty: 30 TABLET | Refills: 11 | Status: SHIPPED | OUTPATIENT
Start: 2022-01-31 | End: 2022-02-02

## 2022-02-02 RX ORDER — LEVOTHYROXINE SODIUM 112 UG/1
112 TABLET ORAL
Qty: 30 TABLET | Refills: 11 | Status: SHIPPED | OUTPATIENT
Start: 2022-02-02 | End: 2022-02-08 | Stop reason: SDUPTHER

## 2022-03-02 ENCOUNTER — IMMUNIZATION (OUTPATIENT)
Dept: INTERNAL MEDICINE | Facility: CLINIC | Age: 37
End: 2022-03-02
Payer: COMMERCIAL

## 2022-03-02 DIAGNOSIS — Z23 NEED FOR VACCINATION: Primary | ICD-10-CM

## 2022-03-02 PROCEDURE — 91305 COVID-19, MRNA, LNP-S, PF, 30 MCG/0.3 ML DOSE VACCINE (PFIZER): CPT | Mod: S$GLB,,, | Performed by: INTERNAL MEDICINE

## 2022-03-02 PROCEDURE — 91305 COVID-19, MRNA, LNP-S, PF, 30 MCG/0.3 ML DOSE VACCINE (PFIZER): ICD-10-PCS | Mod: S$GLB,,, | Performed by: INTERNAL MEDICINE

## 2022-03-07 ENCOUNTER — PATIENT MESSAGE (OUTPATIENT)
Dept: OBSTETRICS AND GYNECOLOGY | Facility: CLINIC | Age: 37
End: 2022-03-07
Payer: COMMERCIAL

## 2022-04-27 ENCOUNTER — PATIENT MESSAGE (OUTPATIENT)
Dept: ADMINISTRATIVE | Facility: HOSPITAL | Age: 37
End: 2022-04-27
Payer: COMMERCIAL

## 2022-04-29 ENCOUNTER — OFFICE VISIT (OUTPATIENT)
Dept: PRIMARY CARE CLINIC | Facility: CLINIC | Age: 37
End: 2022-04-29
Payer: COMMERCIAL

## 2022-04-29 DIAGNOSIS — J22 BACTERIAL LOWER RESPIRATORY INFECTION: Primary | ICD-10-CM

## 2022-04-29 DIAGNOSIS — B96.89 BACTERIAL LOWER RESPIRATORY INFECTION: Primary | ICD-10-CM

## 2022-04-29 PROCEDURE — 99213 PR OFFICE/OUTPT VISIT, EST, LEVL III, 20-29 MIN: ICD-10-PCS | Mod: 95,,, | Performed by: PHYSICIAN ASSISTANT

## 2022-04-29 PROCEDURE — 1159F PR MEDICATION LIST DOCUMENTED IN MEDICAL RECORD: ICD-10-PCS | Mod: CPTII,95,, | Performed by: PHYSICIAN ASSISTANT

## 2022-04-29 PROCEDURE — 1160F RVW MEDS BY RX/DR IN RCRD: CPT | Mod: CPTII,95,, | Performed by: PHYSICIAN ASSISTANT

## 2022-04-29 PROCEDURE — 1160F PR REVIEW ALL MEDS BY PRESCRIBER/CLIN PHARMACIST DOCUMENTED: ICD-10-PCS | Mod: CPTII,95,, | Performed by: PHYSICIAN ASSISTANT

## 2022-04-29 PROCEDURE — 1159F MED LIST DOCD IN RCRD: CPT | Mod: CPTII,95,, | Performed by: PHYSICIAN ASSISTANT

## 2022-04-29 PROCEDURE — 99213 OFFICE O/P EST LOW 20 MIN: CPT | Mod: 95,,, | Performed by: PHYSICIAN ASSISTANT

## 2022-04-29 RX ORDER — AZITHROMYCIN 250 MG/1
TABLET, FILM COATED ORAL
Qty: 6 TABLET | Refills: 0 | Status: SHIPPED | OUTPATIENT
Start: 2022-04-29 | End: 2022-05-04

## 2022-04-29 RX ORDER — PROMETHAZINE HYDROCHLORIDE AND DEXTROMETHORPHAN HYDROBROMIDE 6.25; 15 MG/5ML; MG/5ML
5 SYRUP ORAL NIGHTLY PRN
Qty: 118 ML | Refills: 0 | Status: SHIPPED | OUTPATIENT
Start: 2022-04-29 | End: 2022-05-09

## 2022-04-29 NOTE — PROGRESS NOTES
Subjective:      Patient ID: Monika Connell is a 36 y.o. female.    Chief Complaint: URI    The patient location is: Home   The chief complaint leading to consultation is: URI     Visit type: audiovisual    Face to Face time with patient: 6 minutes   8 minutes of total time spent on the encounter, which includes face to face time and non-face to face time preparing to see the patient (eg, review of tests), Obtaining and/or reviewing separately obtained history, Documenting clinical information in the electronic or other health record, Independently interpreting results (not separately reported) and communicating results to the patient/family/caregiver, or Care coordination (not separately reported).         Each patient to whom he or she provides medical services by telemedicine is:  (1) informed of the relationship between the physician and patient and the respective role of any other health care provider with respect to management of the patient; and (2) notified that he or she may decline to receive medical services by telemedicine and may withdraw from such care at any time.    Notes:   Monika Connell is a 36 y.o.female who presents to video visit for URI visit.  In [place] last week visiting brother.  Woke up with chest congestion while in [place].  For past week or so, coughing up mucous.  Worse at night.  Chest feels like on fire.  Taken two negative COVID tests.  No fevers.      Review of Systems   Constitutional: Negative for activity change and unexpected weight change.   HENT: Negative for hearing loss, rhinorrhea and trouble swallowing.    Eyes: Negative for discharge and visual disturbance.   Respiratory: Positive for cough. Negative for chest tightness and wheezing.    Cardiovascular: Negative for chest pain and palpitations.   Gastrointestinal: Negative for blood in stool, constipation, diarrhea and vomiting.   Endocrine: Negative for polydipsia and polyuria.   Genitourinary: Negative for difficulty  urinating, dysuria, hematuria and menstrual problem.   Musculoskeletal: Negative for arthralgias, joint swelling and neck pain.   Neurological: Negative for weakness and headaches.   Psychiatric/Behavioral: Negative for confusion and dysphoric mood.       Objective:   There were no vitals taken for this visit.  Physical Exam  Constitutional:       General: She is not in acute distress.     Appearance: She is well-developed. She is not diaphoretic.   HENT:      Head: Normocephalic and atraumatic.      Right Ear: External ear normal.      Left Ear: External ear normal.      Nose: Nose normal. No rhinorrhea.   Pulmonary:      Effort: Pulmonary effort is normal. No respiratory distress.   Neurological:      Mental Status: She is alert and oriented to person, place, and time.      Motor: No abnormal muscle tone.   Psychiatric:         Mood and Affect: Mood normal.         Behavior: Behavior normal.         Thought Content: Thought content normal.       Assessment:      1. Bacterial lower respiratory infection       Plan:   Bacterial lower respiratory infection  -     azithromycin (Z-FRED) 250 MG tablet; Take 2 tablets by mouth on day 1; Take 1 tablet by mouth on days 2-5  Dispense: 6 tablet; Refill: 0  -     promethazine-dextromethorphan (PROMETHAZINE-DM) 6.25-15 mg/5 mL Syrp; Take 5 mLs by mouth nightly as needed.  Dispense: 118 mL; Refill: 0    discussed symptoms most likely started as viral URI but now that cough has not improved/feels like chest on fire and symptoms greater than 1 week, discussed starting an antibiotic to treat for possible secondary infection.  Offered CXR - patient declined at this time.  Promethazine dm for night time cough.  Increase fluids. Can take OTC mucinex to thin mucous       Brenda Crenshaw PA-C   Physician Assistant   [facility]

## 2022-05-27 ENCOUNTER — PATIENT MESSAGE (OUTPATIENT)
Dept: FAMILY MEDICINE | Facility: CLINIC | Age: 37
End: 2022-05-27
Payer: COMMERCIAL

## 2022-10-07 ENCOUNTER — OFFICE VISIT (OUTPATIENT)
Dept: FAMILY MEDICINE | Facility: CLINIC | Age: 37
End: 2022-10-07
Payer: COMMERCIAL

## 2022-10-07 ENCOUNTER — LAB VISIT (OUTPATIENT)
Dept: LAB | Facility: HOSPITAL | Age: 37
End: 2022-10-07
Attending: INTERNAL MEDICINE
Payer: COMMERCIAL

## 2022-10-07 VITALS
BODY MASS INDEX: 33.18 KG/M2 | HEIGHT: 62 IN | HEART RATE: 85 BPM | WEIGHT: 180.31 LBS | SYSTOLIC BLOOD PRESSURE: 119 MMHG | TEMPERATURE: 98 F | DIASTOLIC BLOOD PRESSURE: 82 MMHG

## 2022-10-07 DIAGNOSIS — F98.8 ATTENTION DEFICIT DISORDER, UNSPECIFIED HYPERACTIVITY PRESENCE: ICD-10-CM

## 2022-10-07 DIAGNOSIS — Z11.59 ENCOUNTER FOR HEPATITIS C SCREENING TEST FOR LOW RISK PATIENT: ICD-10-CM

## 2022-10-07 DIAGNOSIS — Z13.220 ENCOUNTER FOR LIPID SCREENING FOR CARDIOVASCULAR DISEASE: ICD-10-CM

## 2022-10-07 DIAGNOSIS — Z00.00 ENCOUNTER FOR ANNUAL HEALTH EXAMINATION: ICD-10-CM

## 2022-10-07 DIAGNOSIS — F41.8 DEPRESSION WITH ANXIETY: ICD-10-CM

## 2022-10-07 DIAGNOSIS — Z13.6 ENCOUNTER FOR LIPID SCREENING FOR CARDIOVASCULAR DISEASE: ICD-10-CM

## 2022-10-07 DIAGNOSIS — Z23 INFLUENZA VACCINE NEEDED: ICD-10-CM

## 2022-10-07 DIAGNOSIS — E03.8 OTHER SPECIFIED HYPOTHYROIDISM: ICD-10-CM

## 2022-10-07 DIAGNOSIS — E03.9 HYPOTHYROIDISM, UNSPECIFIED TYPE: ICD-10-CM

## 2022-10-07 DIAGNOSIS — Z00.00 ENCOUNTER FOR ANNUAL HEALTH EXAMINATION: Primary | ICD-10-CM

## 2022-10-07 LAB
ALBUMIN SERPL BCP-MCNC: 4.2 G/DL (ref 3.5–5.2)
ALP SERPL-CCNC: 67 U/L (ref 55–135)
ALT SERPL W/O P-5'-P-CCNC: 15 U/L (ref 10–44)
ANION GAP SERPL CALC-SCNC: 12 MMOL/L (ref 8–16)
AST SERPL-CCNC: 22 U/L (ref 10–40)
BILIRUB SERPL-MCNC: 0.9 MG/DL (ref 0.1–1)
BUN SERPL-MCNC: 12 MG/DL (ref 6–20)
CALCIUM SERPL-MCNC: 9.1 MG/DL (ref 8.7–10.5)
CHLORIDE SERPL-SCNC: 101 MMOL/L (ref 95–110)
CHOLEST SERPL-MCNC: 283 MG/DL (ref 120–199)
CHOLEST/HDLC SERPL: 3.8 {RATIO} (ref 2–5)
CO2 SERPL-SCNC: 22 MMOL/L (ref 23–29)
CREAT SERPL-MCNC: 0.7 MG/DL (ref 0.5–1.4)
ERYTHROCYTE [DISTWIDTH] IN BLOOD BY AUTOMATED COUNT: 13.9 % (ref 11.5–14.5)
EST. GFR  (NO RACE VARIABLE): >60 ML/MIN/1.73 M^2
GLUCOSE SERPL-MCNC: 73 MG/DL (ref 70–110)
HCT VFR BLD AUTO: 39.4 % (ref 37–48.5)
HCV AB SERPL QL IA: NORMAL
HDLC SERPL-MCNC: 75 MG/DL (ref 40–75)
HDLC SERPL: 26.5 % (ref 20–50)
HGB BLD-MCNC: 12.6 G/DL (ref 12–16)
LDLC SERPL CALC-MCNC: 196.2 MG/DL (ref 63–159)
MCH RBC QN AUTO: 28.5 PG (ref 27–31)
MCHC RBC AUTO-ENTMCNC: 32 G/DL (ref 32–36)
MCV RBC AUTO: 89 FL (ref 82–98)
NONHDLC SERPL-MCNC: 208 MG/DL
PLATELET # BLD AUTO: 284 K/UL (ref 150–450)
PMV BLD AUTO: 9.4 FL (ref 9.2–12.9)
POTASSIUM SERPL-SCNC: 4.2 MMOL/L (ref 3.5–5.1)
PROT SERPL-MCNC: 7.1 G/DL (ref 6–8.4)
RBC # BLD AUTO: 4.42 M/UL (ref 4–5.4)
SODIUM SERPL-SCNC: 135 MMOL/L (ref 136–145)
T3 SERPL-MCNC: 85 NG/DL (ref 60–180)
T4 FREE SERPL-MCNC: 1.03 NG/DL (ref 0.71–1.51)
TRIGL SERPL-MCNC: 59 MG/DL (ref 30–150)
TSH SERPL DL<=0.005 MIU/L-ACNC: 7.13 UIU/ML (ref 0.4–4)
WBC # BLD AUTO: 5.24 K/UL (ref 3.9–12.7)

## 2022-10-07 PROCEDURE — 3008F PR BODY MASS INDEX (BMI) DOCUMENTED: ICD-10-PCS | Mod: CPTII,S$GLB,, | Performed by: INTERNAL MEDICINE

## 2022-10-07 PROCEDURE — 99999 PR PBB SHADOW E&M-EST. PATIENT-LVL III: ICD-10-PCS | Mod: PBBFAC,,, | Performed by: INTERNAL MEDICINE

## 2022-10-07 PROCEDURE — 3008F BODY MASS INDEX DOCD: CPT | Mod: CPTII,S$GLB,, | Performed by: INTERNAL MEDICINE

## 2022-10-07 PROCEDURE — 1159F PR MEDICATION LIST DOCUMENTED IN MEDICAL RECORD: ICD-10-PCS | Mod: CPTII,S$GLB,, | Performed by: INTERNAL MEDICINE

## 2022-10-07 PROCEDURE — 80061 LIPID PANEL: CPT | Performed by: INTERNAL MEDICINE

## 2022-10-07 PROCEDURE — 36415 COLL VENOUS BLD VENIPUNCTURE: CPT | Mod: PO | Performed by: INTERNAL MEDICINE

## 2022-10-07 PROCEDURE — 84480 ASSAY TRIIODOTHYRONINE (T3): CPT | Performed by: INTERNAL MEDICINE

## 2022-10-07 PROCEDURE — 3079F DIAST BP 80-89 MM HG: CPT | Mod: CPTII,S$GLB,, | Performed by: INTERNAL MEDICINE

## 2022-10-07 PROCEDURE — 90471 IMMUNIZATION ADMIN: CPT | Mod: S$GLB,,, | Performed by: INTERNAL MEDICINE

## 2022-10-07 PROCEDURE — 99395 PREV VISIT EST AGE 18-39: CPT | Mod: 25,S$GLB,, | Performed by: INTERNAL MEDICINE

## 2022-10-07 PROCEDURE — 84443 ASSAY THYROID STIM HORMONE: CPT | Performed by: INTERNAL MEDICINE

## 2022-10-07 PROCEDURE — 80053 COMPREHEN METABOLIC PANEL: CPT | Performed by: INTERNAL MEDICINE

## 2022-10-07 PROCEDURE — 90686 IIV4 VACC NO PRSV 0.5 ML IM: CPT | Mod: S$GLB,,, | Performed by: INTERNAL MEDICINE

## 2022-10-07 PROCEDURE — 90686 FLU VACCINE (QUAD) GREATER THAN OR EQUAL TO 3YO PRESERVATIVE FREE IM: ICD-10-PCS | Mod: S$GLB,,, | Performed by: INTERNAL MEDICINE

## 2022-10-07 PROCEDURE — 3074F SYST BP LT 130 MM HG: CPT | Mod: CPTII,S$GLB,, | Performed by: INTERNAL MEDICINE

## 2022-10-07 PROCEDURE — 85027 COMPLETE CBC AUTOMATED: CPT | Performed by: INTERNAL MEDICINE

## 2022-10-07 PROCEDURE — 90471 FLU VACCINE (QUAD) GREATER THAN OR EQUAL TO 3YO PRESERVATIVE FREE IM: ICD-10-PCS | Mod: S$GLB,,, | Performed by: INTERNAL MEDICINE

## 2022-10-07 PROCEDURE — 84439 ASSAY OF FREE THYROXINE: CPT | Performed by: INTERNAL MEDICINE

## 2022-10-07 PROCEDURE — 3079F PR MOST RECENT DIASTOLIC BLOOD PRESSURE 80-89 MM HG: ICD-10-PCS | Mod: CPTII,S$GLB,, | Performed by: INTERNAL MEDICINE

## 2022-10-07 PROCEDURE — 99999 PR PBB SHADOW E&M-EST. PATIENT-LVL III: CPT | Mod: PBBFAC,,, | Performed by: INTERNAL MEDICINE

## 2022-10-07 PROCEDURE — 3074F PR MOST RECENT SYSTOLIC BLOOD PRESSURE < 130 MM HG: ICD-10-PCS | Mod: CPTII,S$GLB,, | Performed by: INTERNAL MEDICINE

## 2022-10-07 PROCEDURE — 86803 HEPATITIS C AB TEST: CPT | Performed by: INTERNAL MEDICINE

## 2022-10-07 PROCEDURE — 99395 PR PREVENTIVE VISIT,EST,18-39: ICD-10-PCS | Mod: 25,S$GLB,, | Performed by: INTERNAL MEDICINE

## 2022-10-07 PROCEDURE — 1159F MED LIST DOCD IN RCRD: CPT | Mod: CPTII,S$GLB,, | Performed by: INTERNAL MEDICINE

## 2022-10-07 RX ORDER — DEXTROAMPHETAMINE SACCHARATE, AMPHETAMINE ASPARTATE, DEXTROAMPHETAMINE SULFATE AND AMPHETAMINE SULFATE 5; 5; 5; 5 MG/1; MG/1; MG/1; MG/1
1 TABLET ORAL 2 TIMES DAILY
COMMUNITY
Start: 2022-10-04 | End: 2022-10-07

## 2022-10-07 RX ORDER — SERTRALINE HYDROCHLORIDE 25 MG/1
25 TABLET, FILM COATED ORAL DAILY
COMMUNITY
Start: 2022-09-06 | End: 2022-10-07 | Stop reason: SDUPTHER

## 2022-10-07 RX ORDER — DEXTROAMPHETAMINE SACCHARATE, AMPHETAMINE ASPARTATE, DEXTROAMPHETAMINE SULFATE AND AMPHETAMINE SULFATE 5; 5; 5; 5 MG/1; MG/1; MG/1; MG/1
1 TABLET ORAL 2 TIMES DAILY
Qty: 60 TABLET | Refills: 0 | Status: SHIPPED | OUTPATIENT
Start: 2022-10-07 | End: 2022-11-06

## 2022-10-07 RX ORDER — SERTRALINE HYDROCHLORIDE 50 MG/1
50 TABLET, FILM COATED ORAL DAILY
Qty: 90 TABLET | Refills: 3 | Status: SHIPPED | OUTPATIENT
Start: 2022-10-07 | End: 2023-10-09

## 2022-10-07 RX ORDER — SERTRALINE HYDROCHLORIDE 25 MG/1
25 TABLET, FILM COATED ORAL DAILY
Qty: 90 TABLET | Refills: 3 | Status: SHIPPED | OUTPATIENT
Start: 2022-10-07 | End: 2024-01-10

## 2022-10-07 RX ORDER — LEVOTHYROXINE SODIUM 112 UG/1
112 TABLET ORAL
Qty: 90 TABLET | Refills: 3 | Status: SHIPPED | OUTPATIENT
Start: 2022-10-07 | End: 2022-10-10

## 2022-10-07 RX ORDER — DEXTROAMPHETAMINE SACCHARATE, AMPHETAMINE ASPARTATE, DEXTROAMPHETAMINE SULFATE AND AMPHETAMINE SULFATE 5; 5; 5; 5 MG/1; MG/1; MG/1; MG/1
1 TABLET ORAL 2 TIMES DAILY
Qty: 60 TABLET | Refills: 0 | Status: SHIPPED | OUTPATIENT
Start: 2022-10-07 | End: 2023-02-13 | Stop reason: SDUPTHER

## 2022-10-07 NOTE — PROGRESS NOTES
This note is specifically for wellness visit performed today.     WELLNESS EXAM      Patient ID: Monika Connell is a 36 y.o. female.  has a past medical history of ADHD (attention deficit hyperactivity disorder), Allergy, Anxiety, Depression, and Thyroid disease.     Chief Complaint:  Encounter for wellness exam    Well Adult Physical: Patient here for a comprehensive physical exam. Patient reports no problems or complaints today in clinic.     Health Maintenance Topics with due status: Not Due       Topic Last Completion Date    TETANUS VACCINE 2021    Cervical Cancer Screening 2021        ==============================================    Health Maintenance Due   Topic Date Due    Hepatitis C Screening  Never done    COVID-19 Vaccine (4 - Booster for Pfizer series) 2022       Past Medical History:  Past Medical History:   Diagnosis Date    ADHD (attention deficit hyperactivity disorder)     Allergy     Anxiety     Depression     Thyroid disease      Past Surgical History:   Procedure Laterality Date     SECTION       SECTION N/A 2021    Procedure:  SECTION;  Surgeon: Sai Harris MD;  Location: [facility] L&[facility];  Service: OB/GYN;  Laterality: N/A;    EYE SURGERY  one at 7 yo and one at 9 yo     Review of patient's allergies indicates:   Allergen Reactions    Pcn [penicillins] Hives    Clindamycin Itching and Rash     Current Outpatient Medications on File Prior to Visit   Medication Sig Dispense Refill    multivit with min-folic acid 200 mcg Chew Take 1 tablet by mouth once daily.      [DISCONTINUED] dextroamphetamine-amphetamine (ADDERALL) 20 mg tablet Take 1 tablet by mouth 2 (two) times daily.      [DISCONTINUED] sertraline (ZOLOFT) 25 MG tablet Take 25 mg by mouth once daily.      [DISCONTINUED] sertraline (ZOLOFT) 50 MG tablet TAKE 1 TABLET(50 MG) BY MOUTH EVERY DAY AT BEDTIME FOR 1 WEEK THEN TAKE 2 TABLETS BY MOUTH EVERY NIGHT AT BEDTIME (Patient taking  differently: Take 50 mg by mouth once daily.) 30 tablet 0    [DISCONTINUED] SYNTHROID 112 mcg tablet Take 1 tablet (112 mcg total) by mouth before breakfast. Brand Synthroid 30 tablet 11    [DISCONTINUED] liothyronine (CYTOMEL) 5 MCG Tab Take 1 tablet (5 mcg total) by mouth 2 (two) times a day. (Patient not taking: Reported on 10/7/2022) 60 tablet 11     No current facility-administered medications on file prior to visit.     Social History     Socioeconomic History    Marital status:    Occupational History     Employer: OTHER   Tobacco Use    Smoking status: Former     Types: Cigarettes     Quit date: 11/1/2011     Years since quitting: 10.9    Smokeless tobacco: Never   Substance and Sexual Activity    Alcohol use: No    Drug use: Never    Sexual activity: Yes     Partners: Male     Birth control/protection: Condom     Family History   Problem Relation Age of Onset    Breast cancer Paternal Grandmother     Ovarian cancer Neg Hx        Review of Systems   Constitutional: Negative for chills, fever and unexpected weight change.   HENT: Negative for ear pain and sore throat.    Eyes: Negative for redness and visual disturbance.   Respiratory: Negative for cough and shortness of breath.    Cardiovascular: Negative for chest pain and palpitations.   Gastrointestinal: Negative for nausea and vomiting.   Musculoskeletal: Negative for arthralgias and myalgias.   Skin: Negative for rash and wound.   Neurological: Negative for weakness and headaches.         Objective:      Vitals:    10/07/22 0727   BP: 119/82   Pulse: 85   Temp: 97.7 °F (36.5 °C)     Body mass index is 32.98 kg/m².     Physical Exam   Constitutional: Oriented to person, place, and time. Appears well-developed and well-nourished. No distress.   HENT:   Head: Normocephalic and atraumatic.   Eyes: Pupils are equal, round, and reactive to light. EOM are normal.   Neck: Normal range of motion. Neck supple.   Cardiovascular: Normal rate, regular rhythm,  normal heart sounds and intact distal pulses.   No murmur heard.  Pulmonary/Chest: Effort normal and breath sounds normal. No respiratory distress. No wheezes.   Musculoskeletal: Normal range of motion. Exhibits no edema.   Neurological: Alert and oriented to person, place, and time. No cranial nerve deficit.   Skin: Skin is warm and dry. Capillary refill takes less than 2 seconds.   Psychiatric: Normal mood and affect. Behavior is normal.   Nursing note and vitals reviewed.      All labs, imaging and procedures performed since last visit have been personally reviewed.    Assessment / Plan:      Patient here for annual wellness exam. Health maintenance was reviewed and ordered.    Complete history and physical was completed today.  Complete and thorough medication reconciliation was performed.  Discussed risks and benefits of medications.  Advised patient on orders and health maintenance.  We discussed old records and old labs if available.  Will request any records not available through epic.  Continue current medications listed on your summary sheet.    All questions were answered. Patient had no further concerns. Advised of diagnoses and plan. Follow up as planned or return sooner if symptoms persist or worsen.     Problem List Items Addressed This Visit          Psychiatric    Attention deficit disorder    Overview     -chronic; stable on current medication  -currently on adderall 20 mg BID  -denies AE of medications  -The patient was checked in the [facility] Board of Pharmacy's  Prescription Monitoring Program. There appears to be no incongruities with the patient's verbalized history.             Relevant Medications    dextroamphetamine-amphetamine (ADDERALL) 20 mg tablet    dextroamphetamine-amphetamine (ADDERALL) 20 mg tablet    dextroamphetamine-amphetamine (ADDERALL) 20 mg tablet    Depression with anxiety    Overview     -Previously followed by Dr. Lopez with Psychiatry but no longer seeing  psychiatry  -symptoms controlled with zoloft 75 mg QD  -denies AE of medications  -plan to continue             Relevant Medications    sertraline (ZOLOFT) 25 MG tablet    sertraline (ZOLOFT) 50 MG tablet       Endocrine    Hypothyroidism    Overview     Lab Results   Component Value Date    TSH 3.158 07/23/2021   Followed by Dr. Pool Jackson. On Synthroid 112mcg.          Relevant Medications    SYNTHROID 112 mcg tablet     Other Visit Diagnoses       Encounter for annual health examination    -  Primary    Relevant Orders    CBC Without Differential    Comprehensive Metabolic Panel    Lipid Panel    Hepatitis C Antibody    Encounter for lipid screening for cardiovascular disease        Relevant Orders    Lipid Panel    Encounter for hepatitis C screening test for low risk patient        Relevant Orders    Hepatitis C Antibody    Influenza vaccine needed        Relevant Orders    Influenza - Quadrivalent (PF) (Completed)            Follow up in about 3 months (around 1/7/2023) for F/U with me; labs today: cbc,cmp,lipid,hep c; pool labs: tsh,t4,t3.    Kika Casarez MD   Answers submitted by the patient for this visit:  Review of Systems Questionnaire (Submitted on 10/7/2022)  activity change: No  unexpected weight change: No  neck pain: No  hearing loss: No  rhinorrhea: No  trouble swallowing: No  eye discharge: No  visual disturbance: No  chest tightness: No  wheezing: No  chest pain: No  palpitations: No  blood in stool: No  constipation: No  vomiting: No  diarrhea: No  polydipsia: No  polyuria: No  difficulty urinating: No  hematuria: No  menstrual problem: No  dysuria: No  joint swelling: No  arthralgias: No  headaches: No  weakness: No  confusion: No  dysphoric mood: No

## 2022-10-10 ENCOUNTER — PATIENT MESSAGE (OUTPATIENT)
Dept: ENDOCRINOLOGY | Facility: CLINIC | Age: 37
End: 2022-10-10
Payer: COMMERCIAL

## 2022-10-10 ENCOUNTER — TELEPHONE (OUTPATIENT)
Dept: ENDOCRINOLOGY | Facility: CLINIC | Age: 37
End: 2022-10-10
Payer: COMMERCIAL

## 2022-10-10 DIAGNOSIS — E03.9 HYPOTHYROIDISM, UNSPECIFIED TYPE: Primary | ICD-10-CM

## 2022-10-10 RX ORDER — LEVOTHYROXINE SODIUM 125 UG/1
125 TABLET ORAL
Qty: 30 TABLET | Refills: 11 | Status: SHIPPED | OUTPATIENT
Start: 2022-10-10 | End: 2022-12-11

## 2022-10-11 ENCOUNTER — PATIENT MESSAGE (OUTPATIENT)
Dept: ENDOCRINOLOGY | Facility: CLINIC | Age: 37
End: 2022-10-11
Payer: COMMERCIAL

## 2022-10-15 ENCOUNTER — PATIENT MESSAGE (OUTPATIENT)
Dept: FAMILY MEDICINE | Facility: CLINIC | Age: 37
End: 2022-10-15
Payer: COMMERCIAL

## 2022-10-15 DIAGNOSIS — E78.2 MIXED HYPERLIPIDEMIA: ICD-10-CM

## 2022-10-16 NOTE — PROGRESS NOTES
Results have been released via [employer]. Please verify that these have been viewed by patient. If not, please call patient with results.    I have reviewed your recent results.    CBC NORMAL-The CBC appears to be stable at this time with no sign of major anemia, abnormal white count or platelet abnormality.    CMP/BMP NORMAL-The electrolytes all appear stable at this time.  This includes kidney functions along with routine electrolytes like sugar, potassium and sodium.  If it was a CMP test, the liver enzymes were noted to be stable also.    LIPID ABNORMAL-Your LDL is very high. Medication is recommended for LDL>190. Due to your elevated risk with these numbers, I would recommend starting a daily cholesterol medication called a statin. I also recommend continued healthy lifestyle choices, such as diet and exercise. Diet recommendations include the DASH or mediterranean  diets.                                                                                                              If you are agreeable to starting medication, I will call this into your pharmacy. We will then need to recheck your cholesterol levels in 8 weeks after starting medication to make sure you've had an appropriate response to the medication.     Please let me know if you have any additional questions or concerns about above.

## 2022-10-17 PROBLEM — E78.2 MIXED HYPERLIPIDEMIA: Status: ACTIVE | Noted: 2022-10-17

## 2022-10-17 RX ORDER — ROSUVASTATIN CALCIUM 20 MG/1
20 TABLET, COATED ORAL DAILY
Qty: 90 TABLET | Refills: 3 | Status: SHIPPED | OUTPATIENT
Start: 2022-10-17 | End: 2022-12-05

## 2022-10-17 NOTE — TELEPHONE ENCOUNTER
I have signed for the following orders AND/OR meds.  Please call the patient and ask the patient to schedule the testing AND/OR inform about any medications that were sent. Medications have been sent to pharmacy listed below      No orders of the defined types were placed in this encounter.      Medications Ordered This Encounter   Medications    rosuvastatin (CRESTOR) 20 MG tablet     Sig: Take 1 tablet (20 mg total) by mouth once daily.     Dispense:  90 tablet     Refill:  3         [facility][id number] - [place], [place] - [address] AT [place] [address] & [address] [address]  Phone: [phone] Fax: [phone]    [facility] #[id number] - [place] [id number] AT [place] [address] & C [facility]  [address]  [facility] [address]  Phone: [phone] Fax: [phone]

## 2022-10-18 ENCOUNTER — PATIENT MESSAGE (OUTPATIENT)
Dept: FAMILY MEDICINE | Facility: CLINIC | Age: 37
End: 2022-10-18
Payer: COMMERCIAL

## 2022-10-18 DIAGNOSIS — R63.5 WEIGHT GAIN: Primary | ICD-10-CM

## 2022-10-21 NOTE — TELEPHONE ENCOUNTER
Please add labs to Lipid in December. You can also move her TSH to that date as well so she doesn't have to come in twice for labs.    I have signed for the following orders AND/OR meds.  Please call the patient and ask the patient to schedule the testing AND/OR inform about any medications that were sent. Medications have been sent to pharmacy listed below      Orders Placed This Encounter   Procedures    Hemoglobin A1C     Standing Status:   Future     Standing Expiration Date:   12/18/2023    Insulin, random     Standing Status:   Future     Standing Expiration Date:   12/20/2023              [id number] [place] - [address] AT [facility] OF [facility] [address]  [address]  [facility] [address]  Phone: [phone] Fax: [phone]    [email or web address][id number] - [place] - [address] AT [address]  [address]  [place] [address]  Phone: [phone] Fax: [phone]

## 2022-10-29 ENCOUNTER — PATIENT MESSAGE (OUTPATIENT)
Dept: FAMILY MEDICINE | Facility: CLINIC | Age: 37
End: 2022-10-29
Payer: COMMERCIAL

## 2022-12-05 ENCOUNTER — OFFICE VISIT (OUTPATIENT)
Dept: OPTOMETRY | Facility: CLINIC | Age: 37
End: 2022-12-05
Payer: COMMERCIAL

## 2022-12-05 ENCOUNTER — OFFICE VISIT (OUTPATIENT)
Dept: OBSTETRICS AND GYNECOLOGY | Facility: CLINIC | Age: 37
End: 2022-12-05
Payer: COMMERCIAL

## 2022-12-05 ENCOUNTER — LAB VISIT (OUTPATIENT)
Dept: LAB | Facility: HOSPITAL | Age: 37
End: 2022-12-05
Attending: INTERNAL MEDICINE
Payer: COMMERCIAL

## 2022-12-05 VITALS
DIASTOLIC BLOOD PRESSURE: 82 MMHG | BODY MASS INDEX: 32.78 KG/M2 | SYSTOLIC BLOOD PRESSURE: 124 MMHG | WEIGHT: 179.25 LBS

## 2022-12-05 DIAGNOSIS — E03.9 HYPOTHYROIDISM, UNSPECIFIED TYPE: ICD-10-CM

## 2022-12-05 DIAGNOSIS — Z13.6 ENCOUNTER FOR LIPID SCREENING FOR CARDIOVASCULAR DISEASE: ICD-10-CM

## 2022-12-05 DIAGNOSIS — Z46.0 FITTING AND ADJUSTMENT OF SPECTACLES AND CONTACT LENSES: Primary | ICD-10-CM

## 2022-12-05 DIAGNOSIS — Z00.00 ENCOUNTER FOR ANNUAL HEALTH EXAMINATION: ICD-10-CM

## 2022-12-05 DIAGNOSIS — Z13.220 ENCOUNTER FOR LIPID SCREENING FOR CARDIOVASCULAR DISEASE: ICD-10-CM

## 2022-12-05 DIAGNOSIS — R63.5 WEIGHT GAIN: ICD-10-CM

## 2022-12-05 DIAGNOSIS — Z12.4 SCREENING FOR MALIGNANT NEOPLASM OF CERVIX: Primary | ICD-10-CM

## 2022-12-05 DIAGNOSIS — H52.7 REFRACTIVE ERROR: Primary | ICD-10-CM

## 2022-12-05 DIAGNOSIS — Z01.419 ENCOUNTER FOR ANNUAL ROUTINE GYNECOLOGICAL EXAMINATION: ICD-10-CM

## 2022-12-05 LAB
CHOLEST SERPL-MCNC: 274 MG/DL (ref 120–199)
CHOLEST/HDLC SERPL: 3.3 {RATIO} (ref 2–5)
ESTIMATED AVG GLUCOSE: 100 MG/DL (ref 68–131)
HBA1C MFR BLD: 5.1 % (ref 4–5.6)
HDLC SERPL-MCNC: 83 MG/DL (ref 40–75)
HDLC SERPL: 30.3 % (ref 20–50)
INSULIN COLLECTION INTERVAL: NORMAL
INSULIN SERPL-ACNC: 10.3 UU/ML
LDLC SERPL CALC-MCNC: 178.6 MG/DL (ref 63–159)
NONHDLC SERPL-MCNC: 191 MG/DL
T4 FREE SERPL-MCNC: 1.14 NG/DL (ref 0.71–1.51)
TRIGL SERPL-MCNC: 62 MG/DL (ref 30–150)
TSH SERPL DL<=0.005 MIU/L-ACNC: 4.04 UIU/ML (ref 0.4–4)

## 2022-12-05 PROCEDURE — 92310 CONTACT LENS FITTING OU: CPT | Mod: CSM,S$GLB,, | Performed by: OPTOMETRIST

## 2022-12-05 PROCEDURE — 1159F PR MEDICATION LIST DOCUMENTED IN MEDICAL RECORD: ICD-10-PCS | Mod: CPTII,S$GLB,, | Performed by: OPTOMETRIST

## 2022-12-05 PROCEDURE — 88175 CYTOPATH C/V AUTO FLUID REDO: CPT | Performed by: OBSTETRICS & GYNECOLOGY

## 2022-12-05 PROCEDURE — 92015 PR REFRACTION: ICD-10-PCS | Mod: S$GLB,,, | Performed by: OPTOMETRIST

## 2022-12-05 PROCEDURE — 92310 PR CONTACT LENS FITTING (NO CHANGE): ICD-10-PCS | Mod: CSM,S$GLB,, | Performed by: OPTOMETRIST

## 2022-12-05 PROCEDURE — 80061 LIPID PANEL: CPT | Performed by: INTERNAL MEDICINE

## 2022-12-05 PROCEDURE — 99395 PREV VISIT EST AGE 18-39: CPT | Mod: S$GLB,,, | Performed by: OBSTETRICS & GYNECOLOGY

## 2022-12-05 PROCEDURE — 3074F SYST BP LT 130 MM HG: CPT | Mod: CPTII,S$GLB,, | Performed by: OBSTETRICS & GYNECOLOGY

## 2022-12-05 PROCEDURE — 1160F PR REVIEW ALL MEDS BY PRESCRIBER/CLIN PHARMACIST DOCUMENTED: ICD-10-PCS | Mod: CPTII,S$GLB,, | Performed by: OBSTETRICS & GYNECOLOGY

## 2022-12-05 PROCEDURE — 92004 COMPRE OPH EXAM NEW PT 1/>: CPT | Mod: S$GLB,,, | Performed by: OPTOMETRIST

## 2022-12-05 PROCEDURE — 99999 PR PBB SHADOW E&M-EST. PATIENT-LVL II: CPT | Mod: PBBFAC,,, | Performed by: OPTOMETRIST

## 2022-12-05 PROCEDURE — 83036 HEMOGLOBIN GLYCOSYLATED A1C: CPT | Performed by: INTERNAL MEDICINE

## 2022-12-05 PROCEDURE — 99999 PR PBB SHADOW E&M-EST. PATIENT-LVL III: CPT | Mod: PBBFAC,,, | Performed by: OBSTETRICS & GYNECOLOGY

## 2022-12-05 PROCEDURE — 1160F PR REVIEW ALL MEDS BY PRESCRIBER/CLIN PHARMACIST DOCUMENTED: ICD-10-PCS | Mod: CPTII,S$GLB,, | Performed by: OPTOMETRIST

## 2022-12-05 PROCEDURE — 1159F MED LIST DOCD IN RCRD: CPT | Mod: CPTII,S$GLB,, | Performed by: OPTOMETRIST

## 2022-12-05 PROCEDURE — 92004 PR EYE EXAM, NEW PATIENT,COMPREHESV: ICD-10-PCS | Mod: S$GLB,,, | Performed by: OPTOMETRIST

## 2022-12-05 PROCEDURE — 1160F RVW MEDS BY RX/DR IN RCRD: CPT | Mod: CPTII,S$GLB,, | Performed by: OPTOMETRIST

## 2022-12-05 PROCEDURE — 1159F MED LIST DOCD IN RCRD: CPT | Mod: CPTII,S$GLB,, | Performed by: OBSTETRICS & GYNECOLOGY

## 2022-12-05 PROCEDURE — 99395 PR PREVENTIVE VISIT,EST,18-39: ICD-10-PCS | Mod: S$GLB,,, | Performed by: OBSTETRICS & GYNECOLOGY

## 2022-12-05 PROCEDURE — 84439 ASSAY OF FREE THYROXINE: CPT | Performed by: INTERNAL MEDICINE

## 2022-12-05 PROCEDURE — 3079F DIAST BP 80-89 MM HG: CPT | Mod: CPTII,S$GLB,, | Performed by: OBSTETRICS & GYNECOLOGY

## 2022-12-05 PROCEDURE — 99999 PR PBB SHADOW E&M-EST. PATIENT-LVL III: ICD-10-PCS | Mod: PBBFAC,,, | Performed by: OBSTETRICS & GYNECOLOGY

## 2022-12-05 PROCEDURE — 1160F RVW MEDS BY RX/DR IN RCRD: CPT | Mod: CPTII,S$GLB,, | Performed by: OBSTETRICS & GYNECOLOGY

## 2022-12-05 PROCEDURE — 1159F PR MEDICATION LIST DOCUMENTED IN MEDICAL RECORD: ICD-10-PCS | Mod: CPTII,S$GLB,, | Performed by: OBSTETRICS & GYNECOLOGY

## 2022-12-05 PROCEDURE — 83525 ASSAY OF INSULIN: CPT | Performed by: INTERNAL MEDICINE

## 2022-12-05 PROCEDURE — 3074F PR MOST RECENT SYSTOLIC BLOOD PRESSURE < 130 MM HG: ICD-10-PCS | Mod: CPTII,S$GLB,, | Performed by: OBSTETRICS & GYNECOLOGY

## 2022-12-05 PROCEDURE — 3008F PR BODY MASS INDEX (BMI) DOCUMENTED: ICD-10-PCS | Mod: CPTII,S$GLB,, | Performed by: OBSTETRICS & GYNECOLOGY

## 2022-12-05 PROCEDURE — 84443 ASSAY THYROID STIM HORMONE: CPT | Performed by: INTERNAL MEDICINE

## 2022-12-05 PROCEDURE — 99999 PR PBB SHADOW E&M-EST. PATIENT-LVL III: CPT | Mod: PBBFAC,,, | Performed by: OPTOMETRIST

## 2022-12-05 PROCEDURE — 3008F BODY MASS INDEX DOCD: CPT | Mod: CPTII,S$GLB,, | Performed by: OBSTETRICS & GYNECOLOGY

## 2022-12-05 PROCEDURE — 92015 DETERMINE REFRACTIVE STATE: CPT | Mod: S$GLB,,, | Performed by: OPTOMETRIST

## 2022-12-05 PROCEDURE — 99999 PR PBB SHADOW E&M-EST. PATIENT-LVL III: ICD-10-PCS | Mod: PBBFAC,,, | Performed by: OPTOMETRIST

## 2022-12-05 PROCEDURE — 99999 PR PBB SHADOW E&M-EST. PATIENT-LVL II: ICD-10-PCS | Mod: PBBFAC,,, | Performed by: OPTOMETRIST

## 2022-12-05 PROCEDURE — 3079F PR MOST RECENT DIASTOLIC BLOOD PRESSURE 80-89 MM HG: ICD-10-PCS | Mod: CPTII,S$GLB,, | Performed by: OBSTETRICS & GYNECOLOGY

## 2022-12-05 RX ORDER — PROGESTERONE 200 MG/1
200 CAPSULE ORAL NIGHTLY
Qty: 12 CAPSULE | Refills: 11 | Status: SHIPPED | OUTPATIENT
Start: 2022-12-05 | End: 2023-12-05

## 2022-12-05 NOTE — PROGRESS NOTES
HPI     Concerns About Ocular Health     Additional comments: Ocular health exam with clfit           Comments    DLE: x 18 months    Pt states no va complaints at this time.           Last edited by Cheryle Quintana on 12/5/2022  1:56 PM.            Assessment /Plan     For exam results, see Encounter Report.    Refractive error      Spectacle Rx given and Contact lens Rx released to pt. Daily wear only advised, with education to risks of extended wear.  Discussed lens care, compliance and solutions. RTC yearly contact lens follow up.  , discussed different options for glasses. RTC 1 year routine eye exam.

## 2022-12-05 NOTE — PROGRESS NOTES
Chief Complaint   Patient presents with    [facility]       History and Physical:  Patient's last menstrual period was 10/16/2022.       Monika Connell is a 37 y.o.  WF who presents today for her routine annual GYN exam. The patient has no Gynecology complaints today. annual      Allergies:   Review of patient's allergies indicates:   Allergen Reactions    Pcn [penicillins] Hives    Clindamycin Itching and Rash       Past Medical History:   Diagnosis Date    ADHD (attention deficit hyperactivity disorder)     Allergy     Anxiety     Depression     Thyroid disease        Past Surgical History:   Procedure Laterality Date     SECTION       SECTION N/A 2021    Procedure:  SECTION;  Surgeon: Sai Harris MD;  [facility];  Service: OB/GYN;  Laterality: N/A;    EYE SURGERY  one at 5 yo and one at 11 yo       MEDS:   Current Outpatient Medications on File Prior to Visit   Medication Sig Dispense Refill    dextroamphetamine-amphetamine (ADDERALL) 20 mg tablet Take 1 tablet by mouth 2 (two) times daily. 60 tablet 0    levothyroxine (SYNTHROID) 125 MCG tablet Take 1 tablet (125 mcg total) by mouth before breakfast. 30 tablet 11    multivit with min-folic acid 200 mcg Chew Take 1 tablet by mouth once daily.      sertraline (ZOLOFT) 25 MG tablet Take 1 tablet (25 mg total) by mouth once daily. 90 tablet 3    sertraline (ZOLOFT) 50 MG tablet Take 1 tablet (50 mg total) by mouth once daily. 90 tablet 3    [DISCONTINUED] rosuvastatin (CRESTOR) 20 MG tablet Take 1 tablet (20 mg total) by mouth once daily. (Patient not taking: Reported on 2022) 90 tablet 3     No current facility-administered medications on file prior to visit.       OB History          2    Para   2    Term   2            AB        Living   2         SAB        IAB        Ectopic        Multiple   0    Live Births   2                 Social History     Socioeconomic History     Marital status:    Occupational History     Employer: OTHER   Tobacco Use    Smoking status: Former     Types: Cigarettes     Quit date: 2011     Years since quittin.1    Smokeless tobacco: Never   Substance and Sexual Activity    Alcohol use: No    Drug use: Never    Sexual activity: Yes     Partners: Male     Birth control/protection: Condom       Family History   Problem Relation Age of Onset    Macular degeneration Maternal Grandmother     Breast cancer Paternal Grandmother     Macular degeneration Paternal Grandfather     Ovarian cancer Neg Hx     Glaucoma Neg Hx          Past medical and surgical history reviewed.   I have reviewed the patient's medical history in detail and updated the computerized patient record.        Review of System:   General: no chills, fever, night sweats, weight gain or weight loss  Psychological: no depression or suicidal ideation  Breasts: no new or changing breast lumps, nipple discharge or masses.  Respiratory: no cough, shortness of breath, or wheezing  Cardiovascular: no chest pain or dyspnea on exertion  Gastrointestinal: no abdominal pain, change in bowel habits, or black or bloody stools  Genito-Urinary: no incontinence, urinary frequency/urgency or vulvar/vaginal symptoms, pelvic pain or abnormal vaginal bleeding.  Musculoskeletal: no gait disturbance or muscular weakness      Physical Exam:   /82   Wt 81.3 kg (179 lb 3.7 oz)   LMP 10/16/2022   BMI 32.78 kg/m²   Constitutional: She appears alert and responsive. She appears well-developed, well-groomed, and well-nourished. No distress.   HENT:   Head: Normocephalic and atraumatic.   Eyes: Conjunctivae and EOM are normal. No scleral icterus.   Neck: Symmetrical. Normal range of motion. Neck supple. No tracheal deviation present. THYROID:  without masses or tenderness.  Cardiovascular: Normal rate, no rhythm abnormality noted. Extremities without swelling or edema, warm.    Pulmonary/Chest: Normal  respiratory Effort. No distress or retractions. She exhibits no tenderness.  Breasts: are symmetrical.no masses    Right breast exhibits no inverted nipple, no mass, no nipple discharge, no skin change and no tenderness.   Left breast exhibits no inverted nipple, no mass, no nipple discharge, no skin change and no tenderness.  Abdominal: Soft. She exhibits no distension, hernias or masses. There is no tenderness. No enlargement of liver edge or spleen.  There is no rebound and no guarding.   Genitourinary:    External rectal exam shows no thrombosed external hemorrhoids, no lesions.     Pelvic exam was performed with patient supine.   No labial fusion, and symmetrical.    There is no rash, lesion or injury on the right labia.   There is no rash, lesion or injury on the left labia.   No bleeding and no signs of injury around the vaginal introitus, urethral meatus is normal size and without prolapse or lesions, urethra well supported. The cervix is visualized with no discharge, lesions or friability.   No vaginal discharge found.    No significant Cystocele, Enterocele or rectocele, and cervix and uterus well supported.   Bimanual exam:   The urethra is normal to palpation and there are no palpable vaginal wall masses.   Uterus is not deviated, not enlarged, not fixed, normal shape and not tender.   Cervix exhibits no motion tenderness.    Right adnexum displays no mass or nodularity and no tenderness.   Left adnexum displays no mass or nodularity and no tenderness.  Musculoskeletal: Normal range of motion.   Lymphadenopathy: No inguinal adenopathy present.   Neurological: She is alert and oriented to person, place, and time. Coordination normal.   Skin: Skin is warm and dry. She is not diaphoretic. No rashes, lesions or ulcers.   Psychiatric: She has a normal mood and affect, oriented to person, place, and time.      Assessment:   Normal annual GYN exam  1. Screening for malignant neoplasm of cervix        2.  Encounter for annual routine gynecological examination          Depression on Zoloft  Stopped breast feeding 6/21  Irreg menses, spaced then heavy  Plan:   PAP  Prom 200mg x 12d x 3mos  Follow up in 1 year.  Patient informed will be contacted with results within 2 weeks. Encouraged to please call back or email if she has not heard from us by then.

## 2022-12-06 ENCOUNTER — PATIENT MESSAGE (OUTPATIENT)
Dept: OPTOMETRY | Facility: CLINIC | Age: 37
End: 2022-12-06
Payer: COMMERCIAL

## 2022-12-10 LAB
FINAL PATHOLOGIC DIAGNOSIS: NORMAL
Lab: NORMAL

## 2022-12-11 ENCOUNTER — TELEPHONE (OUTPATIENT)
Dept: ENDOCRINOLOGY | Facility: CLINIC | Age: 37
End: 2022-12-11
Payer: COMMERCIAL

## 2022-12-11 DIAGNOSIS — E03.9 HYPOTHYROIDISM, UNSPECIFIED TYPE: Primary | ICD-10-CM

## 2022-12-11 RX ORDER — LEVOTHYROXINE SODIUM 137 UG/1
137 TABLET ORAL
Qty: 30 TABLET | Refills: 11 | Status: SHIPPED | OUTPATIENT
Start: 2022-12-11 | End: 2024-01-23 | Stop reason: SDUPTHER

## 2022-12-12 NOTE — TELEPHONE ENCOUNTER
Spoke to pt and adv of Dr Lanza's previous message. Scheduled labs. Adv date, time and location, voiced understanding.

## 2022-12-28 NOTE — PROGRESS NOTES
Results have been released via [employer]. Please verify that these have been viewed by patient. If not, please call patient with results.    I have sent a message to them with the following interpretation (see below).    I have reviewed your recent results.    Insulin and a1c were both normal.    The cholesterol improved some but still elevated. Were you able to restart the cholesterol medication? If you did and still experience side effects, then I recommend that we try a different statin medication instead.    Please do not hesitate to call or message with any additional questions or concerns.    Kika Casarez MD

## 2022-12-29 ENCOUNTER — TELEPHONE (OUTPATIENT)
Dept: FAMILY MEDICINE | Facility: CLINIC | Age: 37
End: 2022-12-29
Payer: COMMERCIAL

## 2022-12-29 DIAGNOSIS — E78.2 MIXED HYPERLIPIDEMIA: Primary | ICD-10-CM

## 2022-12-29 RX ORDER — PRAVASTATIN SODIUM 20 MG/1
20 TABLET ORAL DAILY
Qty: 90 TABLET | Refills: 3 | Status: SHIPPED | OUTPATIENT
Start: 2022-12-29 | End: 2023-06-13

## 2022-12-29 NOTE — TELEPHONE ENCOUNTER
----- Message from Skyla Tripp LPN sent at 12/28/2022  4:15 PM CST -----  Patient informed of results. She says she did not restart the cholesterol medication. She only stopped taking it and noticed that the side effects subsided. She would be interested in trying a different statin medication if recommended.

## 2022-12-29 NOTE — TELEPHONE ENCOUNTER
Pravastatin sent instead    I have signed for the following orders AND/OR meds.  Please call the patient and ask the patient to schedule the testing AND/OR inform about any medications that were sent. Medications have been sent to pharmacy listed below      No orders of the defined types were placed in this encounter.      Medications Ordered This Encounter   Medications    pravastatin (PRAVACHOL) 20 MG tablet     Sig: Take 1 tablet (20 mg total) by mouth once daily.     Dispense:  90 tablet     Refill:  3         [facility] #[id number] - [place], [place] - [address] AT [place] [address] & [address] [address]  Phone: [phone] Fax: [phone]    [id number] - [place] [address] AT [place] [address] & [address] [facility]  [address]  [facility] [address]  Phone: [phone] Fax: [phone]

## 2023-02-13 ENCOUNTER — OFFICE VISIT (OUTPATIENT)
Dept: FAMILY MEDICINE | Facility: CLINIC | Age: 38
End: 2023-02-13
Payer: COMMERCIAL

## 2023-02-13 ENCOUNTER — PATIENT MESSAGE (OUTPATIENT)
Dept: FAMILY MEDICINE | Facility: CLINIC | Age: 38
End: 2023-02-13

## 2023-02-13 DIAGNOSIS — F98.8 ATTENTION DEFICIT DISORDER, UNSPECIFIED HYPERACTIVITY PRESENCE: ICD-10-CM

## 2023-02-13 PROCEDURE — 1160F PR REVIEW ALL MEDS BY PRESCRIBER/CLIN PHARMACIST DOCUMENTED: ICD-10-PCS | Mod: CPTII,95,, | Performed by: INTERNAL MEDICINE

## 2023-02-13 PROCEDURE — 1159F MED LIST DOCD IN RCRD: CPT | Mod: CPTII,95,, | Performed by: INTERNAL MEDICINE

## 2023-02-13 PROCEDURE — 1160F RVW MEDS BY RX/DR IN RCRD: CPT | Mod: CPTII,95,, | Performed by: INTERNAL MEDICINE

## 2023-02-13 PROCEDURE — 99213 OFFICE O/P EST LOW 20 MIN: CPT | Mod: 95,,, | Performed by: INTERNAL MEDICINE

## 2023-02-13 PROCEDURE — 99213 PR OFFICE/OUTPT VISIT, EST, LEVL III, 20-29 MIN: ICD-10-PCS | Mod: 95,,, | Performed by: INTERNAL MEDICINE

## 2023-02-13 PROCEDURE — 1159F PR MEDICATION LIST DOCUMENTED IN MEDICAL RECORD: ICD-10-PCS | Mod: CPTII,95,, | Performed by: INTERNAL MEDICINE

## 2023-02-13 RX ORDER — DEXTROAMPHETAMINE SACCHARATE, AMPHETAMINE ASPARTATE, DEXTROAMPHETAMINE SULFATE AND AMPHETAMINE SULFATE 5; 5; 5; 5 MG/1; MG/1; MG/1; MG/1
1 TABLET ORAL 2 TIMES DAILY
Qty: 60 TABLET | Refills: 0 | Status: SHIPPED | OUTPATIENT
Start: 2023-02-13 | End: 2023-04-05 | Stop reason: SDUPTHER

## 2023-02-13 NOTE — PROGRESS NOTES
Primary Care Telemedicine Note  The patient location is:  Patient Home   The chief complaint leading to consultation is: ADHD follow up  Total time spent with patient: 10 min    Visit type: Virtual visit with synchronous audio only and video  Each patient to whom he or she provides medical services by telemedicine is:  (1) informed of the relationship between the physician and patient and the respective role of any other health care provider with respect to management of the patient; and (2) notified that he or she may decline to receive medical services by telemedicine and may withdraw from such care at any time.      Assessment/Plan:    Problem List Items Addressed This Visit          Psychiatric    Attention deficit disorder    Overview     -chronic; stable on current medication  -currently on adderall 20 mg BID  -denies AE of medications  -The patient was checked in the [facility] Board of Pharmacy's  Prescription Monitoring Program. There appears to be no incongruities with the patient's verbalized history.             Relevant Medications    dextroamphetamine-amphetamine (ADDERALL) 20 mg tablet       Follow up in 3 months    Kika Casarez MD  _____________________________________________________________________________________________________________________________________________________    CC: ADHD follow up    HPI:    Patient is an established patient who presents today via virtual visit.    The patient is being seen via telehealth today to discuss the chronic use of attention deficit medications.  The patient is enrolled in the Telemedicine ADD Program and receives 3/4 refills via Telehealth with 1/4 being in person.  This visit is 1/3 of the telemedicine visits in that rotation.  The patient has is tracking blood pressures and pulses at home.  The patient has been on current medicine for the last several years and has been stable on the current dose during that time.  The patient has been  compliant on the medicine and is not receiving narcotics from any other physician.  The  for the State of [place] has been viewed.  The patient denies any complications from taking the medicine.  The patient's weight and appetite have been stable and has not had difficulties with agitation.  The patient reports improvement in the symptoms with the current dose.      No new complaints today.    Past Medical History:  Past Medical History:   Diagnosis Date    ADHD (attention deficit hyperactivity disorder)     Allergy     Anxiety     Depression     Thyroid disease      Past Surgical History:   Procedure Laterality Date     SECTION       SECTION N/A 2021    Procedure:  SECTION;  Surgeon: Sai Harris MD;  Location: [facility] L&D;  Service: OB/GYN;  Laterality: N/A;    EYE SURGERY  one at 7 yo and one at 9 yo     Review of patient's allergies indicates:   Allergen Reactions    Pcn [penicillins] Hives    Clindamycin Itching and Rash     Social History     Tobacco Use    Smoking status: Former     Types: Cigarettes     Quit date: 2011     Years since quittin.2    Smokeless tobacco: Never   Substance Use Topics    Alcohol use: No    Drug use: Never     Family History   Problem Relation Age of Onset    Macular degeneration Maternal Grandmother     Breast cancer Paternal Grandmother     Macular degeneration Paternal Grandfather     Ovarian cancer Neg Hx     Glaucoma Neg Hx      Current Outpatient Medications on File Prior to Visit   Medication Sig Dispense Refill    multivit with min-folic acid 200 mcg Chew Take 1 tablet by mouth once daily.      pravastatin (PRAVACHOL) 20 MG tablet Take 1 tablet (20 mg total) by mouth once daily. 90 tablet 3    progesterone (PROMETRIUM) 200 MG capsule Take 1 capsule (200 mg total) by mouth nightly. 12 capsule 11    sertraline (ZOLOFT) 25 MG tablet Take 1 tablet (25 mg total) by mouth once daily. 90 tablet 3    sertraline (ZOLOFT) 50 MG tablet Take 1  tablet (50 mg total) by mouth once daily. 90 tablet 3    SYNTHROID 137 mcg Tab tablet Take 1 tablet (137 mcg total) by mouth before breakfast. 30 tablet 11    [DISCONTINUED] dextroamphetamine-amphetamine (ADDERALL) 20 mg tablet Take 1 tablet by mouth 2 (two) times daily. 60 tablet 0     No current facility-administered medications on file prior to visit.       Review of Systems   Constitutional:  Negative for chills, fever and malaise/fatigue.   HENT:  Negative for hearing loss.    Eyes:  Negative for discharge.   Respiratory:  Negative for cough, shortness of breath and wheezing.    Cardiovascular:  Negative for chest pain, palpitations and leg swelling.   Gastrointestinal:  Negative for abdominal pain, blood in stool, constipation, diarrhea and vomiting.   Genitourinary:  Negative for dysuria, frequency and hematuria.   Musculoskeletal:  Negative for back pain, joint pain and neck pain.   Neurological:  Negative for weakness and headaches.   Endo/Heme/Allergies:  Negative for polydipsia.   Psychiatric/Behavioral:  Negative for depression. The patient is not nervous/anxious.        Physical Exam   No flowsheet data found.  No flowsheet data found.      No flowsheet data found.    Physical Exam  Constitutional:       General: She is not in acute distress.     Appearance: She is well-developed.   HENT:      Head: Normocephalic and atraumatic.   Pulmonary:      Effort: Pulmonary effort is normal. No respiratory distress.   Abdominal:      General: There is no distension.   Musculoskeletal:         General: Normal range of motion.      Cervical back: Normal range of motion.   Neurological:      Mental Status: She is alert and oriented to person, place, and time.   Psychiatric:         Mood and Affect: Mood normal.         Behavior: Behavior normal.       The patient's Health Maintenance was reviewed and the following appears to be due at this time:  Health Maintenance Due   Topic Date Due    COVID-19 Vaccine (4 -  Booster for Pfizer series) 04/27/2022   Answers submitted by the patient for this visit:  Review of Systems Questionnaire (Submitted on 2/13/2023)  activity change: No  unexpected weight change: No  rhinorrhea: No  trouble swallowing: No  visual disturbance: No  chest tightness: No  polyuria: No  difficulty urinating: No  menstrual problem: No  joint swelling: No  arthralgias: No  confusion: No  dysphoric mood: No

## 2023-03-08 ENCOUNTER — PATIENT MESSAGE (OUTPATIENT)
Dept: FAMILY MEDICINE | Facility: CLINIC | Age: 38
End: 2023-03-08
Payer: COMMERCIAL

## 2023-03-08 DIAGNOSIS — E03.8 OTHER SPECIFIED HYPOTHYROIDISM: Primary | ICD-10-CM

## 2023-03-08 NOTE — TELEPHONE ENCOUNTER
I have signed for the following orders AND/OR meds.  Please call the patient and ask the patient to schedule the testing AND/OR inform about any medications that were sent. Medications have been sent to pharmacy listed below      Orders Placed This Encounter   Procedures    TSH     Standing Status:   Future     Standing Expiration Date:   5/6/2024    Hemoglobin A1C     Standing Status:   Future     Standing Expiration Date:   5/6/2024              [id number] [place] - [address] AT [address]  [address] [facility] [address]  Phone: [phone] Fax: [phone]    [facility] #[id number] - [place] - [address] AT [place] [address] & [address] [facility]  [address]  [facility] [address]  Phone: [phone] Fax: [phone]    [facility], [facility] [address] [facility]  [address] [address] [address]  Phone: [phone] Fax: [phone]

## 2023-03-11 ENCOUNTER — PATIENT MESSAGE (OUTPATIENT)
Dept: FAMILY MEDICINE | Facility: CLINIC | Age: 38
End: 2023-03-11
Payer: COMMERCIAL

## 2023-03-11 DIAGNOSIS — E03.8 OTHER SPECIFIED HYPOTHYROIDISM: Primary | ICD-10-CM

## 2023-03-20 NOTE — TELEPHONE ENCOUNTER
I think the only labs she is due for is the TSH. I sent to [facility]    I have signed for the following orders AND/OR meds.  Please call the patient and ask the patient to schedule the testing AND/OR inform about any medications that were sent. Medications have been sent to pharmacy listed below      Orders Placed This Encounter   Procedures    TSH     Standing Status:   Future     Number of Occurrences:   1     Standing Expiration Date:   3/19/2024              [id number] [place] - [address] AT [facility] [address]  [address] [facility] [address]  Phone: [phone] Fax: [phone]    [facility] #[id number] - [place] - [address] AT [place] [address] & [address] [facility]  [address]  [facility] [address]  Phone: [phone] Fax: [phone]    [facility], [facility] [address] [facility]  [address] [address] [address]  Phone: [phone] Fax: [phone]

## 2023-03-22 ENCOUNTER — PATIENT MESSAGE (OUTPATIENT)
Dept: FAMILY MEDICINE | Facility: CLINIC | Age: 38
End: 2023-03-22
Payer: COMMERCIAL

## 2023-03-22 DIAGNOSIS — E78.2 MIXED HYPERLIPIDEMIA: Primary | ICD-10-CM

## 2023-03-24 NOTE — TELEPHONE ENCOUNTER
I have signed for the following orders AND/OR meds.  Please call the patient and ask the patient to schedule the testing AND/OR inform about any medications that were sent. Medications have been sent to pharmacy listed below      Orders Placed This Encounter   Procedures    Lipid Panel     Standing Status:   Future     Number of Occurrences:   1     Standing Expiration Date:   3/23/2024              [facility][id number] - [facility] [place] - [address] AT [facility] OF [facility] [address] [place]  [address] [facility] [address]  Phone: [phone] Fax: [phone]    [facility] #[id number] - [place] - [address] AT [facility] OF [facility] [address] & [address] [facility]  [address]  [facility] [address]  Phone: [phone] Fax: [phone]    [facility] [address] [facility]  [address] [address] [address]  Phone: [phone] Fax: [phone]

## 2023-06-09 DIAGNOSIS — F98.8 ATTENTION DEFICIT DISORDER, UNSPECIFIED HYPERACTIVITY PRESENCE: ICD-10-CM

## 2023-06-09 NOTE — TELEPHONE ENCOUNTER
No care due was identified.  [facility] Embedded Care Due Messages. Reference number: [id number].   6/09/2023 7:58:48 AM CDT

## 2023-06-11 RX ORDER — DEXTROAMPHETAMINE SACCHARATE, AMPHETAMINE ASPARTATE, DEXTROAMPHETAMINE SULFATE AND AMPHETAMINE SULFATE 5; 5; 5; 5 MG/1; MG/1; MG/1; MG/1
1 TABLET ORAL 2 TIMES DAILY
Qty: 60 TABLET | Refills: 0 | OUTPATIENT
Start: 2023-06-11 | End: 2023-07-11

## 2023-06-13 ENCOUNTER — OFFICE VISIT (OUTPATIENT)
Dept: FAMILY MEDICINE | Facility: CLINIC | Age: 38
End: 2023-06-13
Payer: COMMERCIAL

## 2023-06-13 DIAGNOSIS — F98.8 ATTENTION DEFICIT DISORDER, UNSPECIFIED HYPERACTIVITY PRESENCE: Primary | ICD-10-CM

## 2023-06-13 PROCEDURE — 99213 PR OFFICE/OUTPT VISIT, EST, LEVL III, 20-29 MIN: ICD-10-PCS | Mod: 95,,, | Performed by: PHYSICIAN ASSISTANT

## 2023-06-13 PROCEDURE — 99213 OFFICE O/P EST LOW 20 MIN: CPT | Mod: 95,,, | Performed by: PHYSICIAN ASSISTANT

## 2023-06-13 RX ORDER — DEXTROAMPHETAMINE SACCHARATE, AMPHETAMINE ASPARTATE, DEXTROAMPHETAMINE SULFATE AND AMPHETAMINE SULFATE 5; 5; 5; 5 MG/1; MG/1; MG/1; MG/1
1 TABLET ORAL 2 TIMES DAILY
Qty: 60 TABLET | Refills: 0 | Status: SHIPPED | OUTPATIENT
Start: 2023-06-13 | End: 2023-07-13

## 2023-06-13 RX ORDER — DEXTROAMPHETAMINE SACCHARATE, AMPHETAMINE ASPARTATE, DEXTROAMPHETAMINE SULFATE AND AMPHETAMINE SULFATE 5; 5; 5; 5 MG/1; MG/1; MG/1; MG/1
1 TABLET ORAL 2 TIMES DAILY
Qty: 60 TABLET | Refills: 0 | Status: SHIPPED | OUTPATIENT
Start: 2023-07-13 | End: 2023-08-12

## 2023-06-13 RX ORDER — DEXTROAMPHETAMINE SACCHARATE, AMPHETAMINE ASPARTATE, DEXTROAMPHETAMINE SULFATE AND AMPHETAMINE SULFATE 5; 5; 5; 5 MG/1; MG/1; MG/1; MG/1
1 TABLET ORAL 2 TIMES DAILY
Qty: 60 TABLET | Refills: 0 | Status: SHIPPED | OUTPATIENT
Start: 2023-08-12 | End: 2023-09-11

## 2023-06-13 NOTE — PROGRESS NOTES
Primary Care Telemedicine Note  The patient location is: Patient Home   The chief complaint leading to consultation is: Attention Deficit Disorder  Total time spent with patient: 10 minutes    Visit type: Virtual visit with synchronous audio only and video  Each patient to whom he or she provides medical services by telemedicine is: (1) informed of the relationship between the physician and patient and the respective role of any other health care provider with respect to management of the patient; and (2) notified that he or she may decline to receive medical services by telemedicine and may withdraw from such care at any time.      Assessment/Plan:    Problem List Items Addressed This Visit          Psychiatric    Attention deficit disorder - Primary    Overview     -chronic; stable on current medication  -currently on adderall 20 mg BID  -denies AE of medications  -The patient was checked in the [facility] Board of Pharmacy's  Prescription Monitoring Program. There appears to be no incongruities with the patient's verbalized history.             Relevant Medications    dextroamphetamine-amphetamine (ADDERALL) 20 mg tablet    dextroamphetamine-amphetamine (ADDERALL) 20 mg tablet (Start on 7/13/2023)    dextroamphetamine-amphetamine (ADDERALL) 20 mg tablet (Start on 8/12/2023)       Follow up in about 3 months (around 9/13/2023), or if symptoms worsen or fail to improve, for Virtual Visit.    Aleida Wallace PA-C  _____________________________________________________________________________________________________________________________________________________    CC: Attention Deficit Disorder    HPI: The patient is being seen via telehealth today to discuss the chronic use of attention deficit medications. The patient is enrolled in the Telemedicine ADD Program and receives 3/4 refills via Telehealth with 1/4 being in person. This visit is 2/3 of the telemedicine visits in that rotation. The patient has is  tracking blood pressures and pulses at home. The patient has been on current medicine for the last several years and has been stable on the current dose during that time. The patient has been compliant on the medicine and is not receiving narcotics from any other physician. The  for the State of [place] has been viewed. The patient denies any complications from taking the medicine. The patient's weight and appetite have been stable and has not had difficulties with agitation. The patient reports improvement in the symptoms with the current dose. No other complaints today.     Past Medical History:   Diagnosis Date    ADHD (attention deficit hyperactivity disorder)     Allergy     Anxiety     Depression     Thyroid disease      Past Surgical History:   Procedure Laterality Date     SECTION       SECTION N/A 2021    Procedure:  SECTION;  Surgeon: Sai Harris MD;  Location: [facility] L&D;  Service: OB/GYN;  Laterality: N/A;    EYE SURGERY  one at 5 yo and one at 11 yo     Review of patient's allergies indicates:   Allergen Reactions    Pcn [penicillins] Hives    Clindamycin Itching and Rash     Social History     Tobacco Use    Smoking status: Former     Types: Cigarettes     Quit date: 2011     Years since quittin.6    Smokeless tobacco: Never   Substance Use Topics    Alcohol use: No    Drug use: Never     Family History   Problem Relation Age of Onset    Macular degeneration Maternal Grandmother     Breast cancer Paternal Grandmother     Macular degeneration Paternal Grandfather     Ovarian cancer Neg Hx     Glaucoma Neg Hx      Current Outpatient Medications on File Prior to Visit   Medication Sig Dispense Refill    multivit with min-folic acid 200 mcg Chew Take 1 tablet by mouth once daily.      progesterone (PROMETRIUM) 200 MG capsule Take 1 capsule (200 mg total) by mouth nightly. 12 capsule 11    sertraline (ZOLOFT) 25 MG tablet Take 1 tablet (25 mg total) by mouth once  daily. 90 tablet 3    sertraline (ZOLOFT) 50 MG tablet Take 1 tablet (50 mg total) by mouth once daily. 90 tablet 3    SYNTHROID 137 mcg Tab tablet Take 1 tablet (137 mcg total) by mouth before breakfast. 30 tablet 11    [DISCONTINUED] pravastatin (PRAVACHOL) 20 MG tablet Take 1 tablet (20 mg total) by mouth once daily. 90 tablet 3     No current facility-administered medications on file prior to visit.       Review of Systems   Constitutional:  Negative for chills, fever and malaise/fatigue.   Respiratory:  Negative for cough and shortness of breath.    Cardiovascular:  Negative for chest pain, palpitations and leg swelling.   Gastrointestinal:  Negative for abdominal pain, constipation and diarrhea.   Genitourinary:  Negative for dysuria and frequency.   Musculoskeletal:  Negative for back pain and joint pain.   Neurological:  Negative for headaches.   Psychiatric/Behavioral:  Negative for depression. The patient is not nervous/anxious.        Physical Exam  Constitutional:       General: She is not in acute distress.     Appearance: She is well-developed.   HENT:      Head: Normocephalic and atraumatic.   Pulmonary:      Effort: Pulmonary effort is normal. No respiratory distress.   Abdominal:      General: There is no distension.   Musculoskeletal:         General: Normal range of motion.      Cervical back: Normal range of motion.   Neurological:      Mental Status: She is alert and oriented to person, place, and time.   Psychiatric:         Mood and Affect: Mood normal.         Behavior: Behavior normal.       The patient's Health Maintenance was reviewed and the following appears to be due at this time:  Health Maintenance Due   Topic Date Due    COVID-19 Vaccine (4 - Pfizer series) 04/27/2022

## 2023-09-12 ENCOUNTER — OFFICE VISIT (OUTPATIENT)
Dept: FAMILY MEDICINE | Facility: CLINIC | Age: 38
End: 2023-09-12
Payer: COMMERCIAL

## 2023-09-12 DIAGNOSIS — F98.8 ATTENTION DEFICIT DISORDER, UNSPECIFIED HYPERACTIVITY PRESENCE: Primary | ICD-10-CM

## 2023-09-12 PROCEDURE — 99213 PR OFFICE/OUTPT VISIT, EST, LEVL III, 20-29 MIN: ICD-10-PCS | Mod: 95,,, | Performed by: PHYSICIAN ASSISTANT

## 2023-09-12 PROCEDURE — 1160F RVW MEDS BY RX/DR IN RCRD: CPT | Mod: CPTII,95,, | Performed by: PHYSICIAN ASSISTANT

## 2023-09-12 PROCEDURE — 99213 OFFICE O/P EST LOW 20 MIN: CPT | Mod: 95,,, | Performed by: PHYSICIAN ASSISTANT

## 2023-09-12 PROCEDURE — 1159F MED LIST DOCD IN RCRD: CPT | Mod: CPTII,95,, | Performed by: PHYSICIAN ASSISTANT

## 2023-09-12 PROCEDURE — 1159F PR MEDICATION LIST DOCUMENTED IN MEDICAL RECORD: ICD-10-PCS | Mod: CPTII,95,, | Performed by: PHYSICIAN ASSISTANT

## 2023-09-12 PROCEDURE — 1160F PR REVIEW ALL MEDS BY PRESCRIBER/CLIN PHARMACIST DOCUMENTED: ICD-10-PCS | Mod: CPTII,95,, | Performed by: PHYSICIAN ASSISTANT

## 2023-09-12 RX ORDER — DEXTROAMPHETAMINE SACCHARATE, AMPHETAMINE ASPARTATE, DEXTROAMPHETAMINE SULFATE AND AMPHETAMINE SULFATE 5; 5; 5; 5 MG/1; MG/1; MG/1; MG/1
1 TABLET ORAL 2 TIMES DAILY
Qty: 60 TABLET | Refills: 0 | Status: SHIPPED | OUTPATIENT
Start: 2023-11-12 | End: 2024-01-09

## 2023-09-12 RX ORDER — DEXTROAMPHETAMINE SACCHARATE, AMPHETAMINE ASPARTATE, DEXTROAMPHETAMINE SULFATE AND AMPHETAMINE SULFATE 5; 5; 5; 5 MG/1; MG/1; MG/1; MG/1
1 TABLET ORAL 2 TIMES DAILY
Qty: 60 TABLET | Refills: 0 | Status: SHIPPED | OUTPATIENT
Start: 2023-09-13 | End: 2023-10-13

## 2023-09-12 RX ORDER — DEXTROAMPHETAMINE SACCHARATE, AMPHETAMINE ASPARTATE, DEXTROAMPHETAMINE SULFATE AND AMPHETAMINE SULFATE 5; 5; 5; 5 MG/1; MG/1; MG/1; MG/1
1 TABLET ORAL 2 TIMES DAILY
Qty: 60 TABLET | Refills: 0 | Status: SHIPPED | OUTPATIENT
Start: 2023-10-13 | End: 2023-11-12

## 2023-09-12 NOTE — PROGRESS NOTES
Primary Care Telemedicine Note  The patient location is: Patient Home   The chief complaint leading to consultation is: Attention Deficit Disorder  Total time spent with patient: 5 minutes    Visit type: Virtual visit with synchronous audio only and video  Each patient to whom he or she provides medical services by telemedicine is: (1) informed of the relationship between the physician and patient and the respective role of any other health care provider with respect to management of the patient; and (2) notified that he or she may decline to receive medical services by telemedicine and may withdraw from such care at any time.      Assessment/Plan:    Problem List Items Addressed This Visit          Psychiatric    Attention deficit disorder - Primary    Overview     -chronic; stable on current medication  -currently on adderall 20 mg BID  -denies AE of medications  -The patient was checked in the [facility] Board of Pharmacy's Prescription Monitoring Program. There appears to be no incongruities with the patient's verbalized history.          Relevant Medications    dextroamphetamine-amphetamine (ADDERALL) 20 mg tablet (Start on 9/13/2023)    dextroamphetamine-amphetamine (ADDERALL) 20 mg tablet (Start on 10/13/2023)    dextroamphetamine-amphetamine (ADDERALL) 20 mg tablet (Start on 11/12/2023)       Follow up in about 3 months (around 12/12/2023), or if symptoms worsen or fail to improve, for in person visit and labs.    Aleida Wallace PA-C  _____________________________________________________________________________________________________________________________________________________    CC: Attention Deficit Disorder    HPI: The patient is being seen via telehealth today to discuss the chronic use of attention deficit medications. The patient is enrolled in the Telemedicine ADD Program and receives 3/4 refills via Telehealth with 1/4 being in person. This visit is 3/3 of the telemedicine visits in that  rotation. The patient has is tracking blood pressures and pulses at home. The patient has been on current medicine for the last several years and has been stable on the current dose during that time. The patient has been compliant on the medicine and is not receiving narcotics from any other physician. The  for the [place] has been viewed. The patient denies any complications from taking the medicine. The patient's weight and appetite have been stable and has not had difficulties with agitation. The patient reports improvement in the symptoms with the current dose. No other complaints today.     Past Medical History:   Diagnosis Date    ADHD (attention deficit hyperactivity disorder)     Allergy     Anxiety     Depression     Thyroid disease      Past Surgical History:   Procedure Laterality Date     SECTION       SECTION N/A 2021    Procedure:  SECTION;  Surgeon: Sai Harris MD;  Location: [facility] L&D;  Service: OB/GYN;  Laterality: N/A;    EYE SURGERY  one at 7 yo and one at 11 yo     Review of patient's allergies indicates:   Allergen Reactions    Pcn [penicillins] Hives    Clindamycin Itching and Rash     Social History     Tobacco Use    Smoking status: Former     Current packs/day: 0.00     Types: Cigarettes     Quit date: 2011     Years since quittin.8    Smokeless tobacco: Never   Substance Use Topics    Alcohol use: No    Drug use: Never     Family History   Problem Relation Age of Onset    Macular degeneration Maternal Grandmother     Breast cancer Paternal Grandmother     Macular degeneration Paternal Grandfather     Ovarian cancer Neg Hx     Glaucoma Neg Hx      Current Outpatient Medications on File Prior to Visit   Medication Sig Dispense Refill    dextroamphetamine-amphetamine (ADDERALL) 20 mg tablet Take 1 tablet by mouth 2 (two) times daily. 60 tablet 0    multivit with min-folic acid 200 mcg Chew Take 1 tablet by mouth once daily.      progesterone  (PROMETRIUM) 200 MG capsule Take 1 capsule (200 mg total) by mouth nightly. 12 capsule 11    sertraline (ZOLOFT) 25 MG tablet Take 1 tablet (25 mg total) by mouth once daily. 90 tablet 3    sertraline (ZOLOFT) 50 MG tablet Take 1 tablet (50 mg total) by mouth once daily. 90 tablet 3    SYNTHROID 137 mcg Tab tablet Take 1 tablet (137 mcg total) by mouth before breakfast. 30 tablet 11     No current facility-administered medications on file prior to visit.       Review of Systems   Constitutional:  Negative for chills, fever and malaise/fatigue.   HENT:  Negative for hearing loss.    Eyes:  Negative for discharge.   Respiratory:  Negative for cough, shortness of breath and wheezing.    Cardiovascular:  Negative for chest pain, palpitations and leg swelling.   Gastrointestinal:  Negative for abdominal pain, blood in stool, constipation, diarrhea and vomiting.   Genitourinary:  Negative for dysuria, frequency and hematuria.   Musculoskeletal:  Negative for back pain, joint pain and neck pain.   Neurological:  Negative for weakness and headaches.   Endo/Heme/Allergies:  Negative for polydipsia.   Psychiatric/Behavioral:  Negative for depression. The patient is not nervous/anxious.        Physical Exam  Constitutional:       General: She is not in acute distress.     Appearance: She is well-developed.   HENT:      Head: Normocephalic and atraumatic.   Pulmonary:      Effort: Pulmonary effort is normal. No respiratory distress.   Abdominal:      General: There is no distension.   Musculoskeletal:         General: Normal range of motion.      Cervical back: Normal range of motion.   Neurological:      Mental Status: She is alert and oriented to person, place, and time.   Psychiatric:         Mood and Affect: Mood normal.         Behavior: Behavior normal.         The patient's Health Maintenance was reviewed and the following appears to be due at this time:  Health Maintenance Due   Topic Date Due    COVID-19 Vaccine (4 -  Pfizer series) 04/27/2022    Influenza Vaccine (1) 09/01/2023

## 2023-10-12 ENCOUNTER — OFFICE VISIT (OUTPATIENT)
Dept: FAMILY MEDICINE | Facility: CLINIC | Age: 38
End: 2023-10-12
Payer: COMMERCIAL

## 2023-10-12 DIAGNOSIS — J34.89 NASAL VESTIBULITIS: Primary | ICD-10-CM

## 2023-10-12 PROCEDURE — 1159F PR MEDICATION LIST DOCUMENTED IN MEDICAL RECORD: ICD-10-PCS | Mod: CPTII,95,, | Performed by: INTERNAL MEDICINE

## 2023-10-12 PROCEDURE — 1160F PR REVIEW ALL MEDS BY PRESCRIBER/CLIN PHARMACIST DOCUMENTED: ICD-10-PCS | Mod: CPTII,95,, | Performed by: INTERNAL MEDICINE

## 2023-10-12 PROCEDURE — 1159F MED LIST DOCD IN RCRD: CPT | Mod: CPTII,95,, | Performed by: INTERNAL MEDICINE

## 2023-10-12 PROCEDURE — 1160F RVW MEDS BY RX/DR IN RCRD: CPT | Mod: CPTII,95,, | Performed by: INTERNAL MEDICINE

## 2023-10-12 PROCEDURE — 99213 PR OFFICE/OUTPT VISIT, EST, LEVL III, 20-29 MIN: ICD-10-PCS | Mod: 95,,, | Performed by: INTERNAL MEDICINE

## 2023-10-12 PROCEDURE — 99213 OFFICE O/P EST LOW 20 MIN: CPT | Mod: 95,,, | Performed by: INTERNAL MEDICINE

## 2023-10-12 RX ORDER — MUPIROCIN 20 MG/G
OINTMENT TOPICAL 2 TIMES DAILY
Qty: 30 G | Refills: 1 | Status: SHIPPED | OUTPATIENT
Start: 2023-10-12 | End: 2023-10-22

## 2023-10-12 NOTE — PROGRESS NOTES
Primary Care Telemedicine Note  The patient location is:  Patient Home   The chief complaint leading to consultation is: nasal sore  Total time spent with patient: 10 min    Visit type: Virtual visit with synchronous audio only and video  Each patient to whom he or she provides medical services by telemedicine is:  (1) informed of the relationship between the physician and patient and the respective role of any other health care provider with respect to management of the patient; and (2) notified that he or she may decline to receive medical services by telemedicine and may withdraw from such care at any time.      Assessment/Plan:    Problem List Items Addressed This Visit    None  Visit Diagnoses       Nasal vestibulitis    -  Primary  -start topical abx BID  -patient instructed to follow up ASAP if symptoms do not improve or worsen  -discussed good hand hygiene     Relevant Medications    mupirocin (BACTROBAN) 2 % ointment            Follow up if symptoms worsen or fail to improve.    Kika Caasrez MD  _____________________________________________________________________________________________________________________________________________________    CC: nose sore    HPI:    Patient is an established patient who presents today via virtual visit.    Patient with complaint of sore inside lateral L nare. Noticed just yesterday. Reports mild swelling and pain localized to that area but no significant swelling, warmth or tenderness in the area. Denies fever. Reports a hx of these in the past and treated successfully with topical antibiotic.    No other new complaints today.      Past Medical History:  Past Medical History:   Diagnosis Date    ADHD (attention deficit hyperactivity disorder)     Allergy     Anxiety     Depression     Thyroid disease      Past Surgical History:   Procedure Laterality Date     SECTION       SECTION N/A 2021    Procedure:  SECTION;  Surgeon: Sai MELGAR  MD Steven;  Location: [facility] L&D;  Service: OB/GYN;  Laterality: N/A;    EYE SURGERY  one at 7 yo and one at 9 yo     Review of patient's allergies indicates:   Allergen Reactions    Pcn [penicillins] Hives    Clindamycin Itching and Rash     Social History     Tobacco Use    Smoking status: Former     Current packs/day: 0.00     Types: Cigarettes     Quit date: 2011     Years since quittin.9    Smokeless tobacco: Never   Substance Use Topics    Alcohol use: No    Drug use: Never     Family History   Problem Relation Age of Onset    Macular degeneration Maternal Grandmother     Breast cancer Paternal Grandmother     Macular degeneration Paternal Grandfather     Ovarian cancer Neg Hx     Glaucoma Neg Hx      Current Outpatient Medications on File Prior to Visit   Medication Sig Dispense Refill    dextroamphetamine-amphetamine (ADDERALL) 20 mg tablet Take 1 tablet by mouth 2 (two) times daily. 60 tablet 0    [START ON 10/13/2023] dextroamphetamine-amphetamine (ADDERALL) 20 mg tablet Take 1 tablet by mouth 2 (two) times daily. 60 tablet 0    [START ON 2023] dextroamphetamine-amphetamine (ADDERALL) 20 mg tablet Take 1 tablet by mouth 2 (two) times daily. 60 tablet 0    multivit with min-folic acid 200 mcg Chew Take 1 tablet by mouth once daily.      progesterone (PROMETRIUM) 200 MG capsule Take 1 capsule (200 mg total) by mouth nightly. 12 capsule 11    sertraline (ZOLOFT) 25 MG tablet Take 1 tablet (25 mg total) by mouth once daily. 90 tablet 3    sertraline (ZOLOFT) 50 MG tablet TAKE 1 TABLET(50 MG) BY MOUTH EVERY DAY 90 tablet 1    SYNTHROID 137 mcg Tab tablet Take 1 tablet (137 mcg total) by mouth before breakfast. 30 tablet 11     No current facility-administered medications on file prior to visit.       Review of Systems   Constitutional:  Negative for chills, fever and malaise/fatigue.   HENT:  Negative for hearing loss.    Eyes:  Negative for discharge.   Respiratory:  Negative for cough, shortness  of breath and wheezing.    Cardiovascular:  Negative for chest pain, palpitations and leg swelling.   Gastrointestinal:  Negative for abdominal pain, blood in stool, constipation, diarrhea and vomiting.   Genitourinary:  Negative for dysuria, frequency and hematuria.   Musculoskeletal:  Negative for back pain, joint pain and neck pain.   Neurological:  Negative for weakness and headaches.   Endo/Heme/Allergies:  Negative for polydipsia.   Psychiatric/Behavioral:  Negative for depression. The patient is not nervous/anxious.          Physical Exam        No data to display                   No data to display                       No data to display                Physical Exam  Constitutional:       General: She is not in acute distress.     Appearance: She is well-developed.   HENT:      Head: Normocephalic and atraumatic.   Pulmonary:      Effort: Pulmonary effort is normal. No respiratory distress.   Abdominal:      General: There is no distension.   Musculoskeletal:         General: Normal range of motion.      Cervical back: Normal range of motion.   Neurological:      Mental Status: She is alert and oriented to person, place, and time.   Psychiatric:         Mood and Affect: Mood normal.         Behavior: Behavior normal.         The patient's Health Maintenance was reviewed and the following appears to be due at this time:  Health Maintenance Due   Topic Date Due    Influenza Vaccine (1) 09/01/2023    COVID-19 Vaccine (4 - 2023-24 season) 09/01/2023     Answers submitted by the patient for this visit:  Review of Systems Questionnaire (Submitted on 10/12/2023)  activity change: No  unexpected weight change: No  rhinorrhea: No  trouble swallowing: No  visual disturbance: No  chest tightness: No  polyuria: No  difficulty urinating: No  menstrual problem: No  joint swelling: No  arthralgias: No  confusion: No  dysphoric mood: No

## 2023-12-22 ENCOUNTER — OFFICE VISIT (OUTPATIENT)
Dept: OPTOMETRY | Facility: CLINIC | Age: 38
End: 2023-12-22
Payer: COMMERCIAL

## 2023-12-22 DIAGNOSIS — H52.7 REFRACTIVE ERROR: Primary | ICD-10-CM

## 2023-12-22 PROCEDURE — 99999 PR PBB SHADOW E&M-EST. PATIENT-LVL III: CPT | Mod: PBBFAC,,, | Performed by: OPTOMETRIST

## 2023-12-22 PROCEDURE — 1159F PR MEDICATION LIST DOCUMENTED IN MEDICAL RECORD: ICD-10-PCS | Mod: CPTII,S$GLB,, | Performed by: OPTOMETRIST

## 2023-12-22 PROCEDURE — 1160F RVW MEDS BY RX/DR IN RCRD: CPT | Mod: CPTII,S$GLB,, | Performed by: OPTOMETRIST

## 2023-12-22 PROCEDURE — 92015 DETERMINE REFRACTIVE STATE: CPT | Mod: S$GLB,,, | Performed by: OPTOMETRIST

## 2023-12-22 PROCEDURE — 1160F PR REVIEW ALL MEDS BY PRESCRIBER/CLIN PHARMACIST DOCUMENTED: ICD-10-PCS | Mod: CPTII,S$GLB,, | Performed by: OPTOMETRIST

## 2023-12-22 PROCEDURE — 99499 NO LOS: ICD-10-PCS | Mod: S$GLB,,, | Performed by: OPTOMETRIST

## 2023-12-22 PROCEDURE — 99499 UNLISTED E&M SERVICE: CPT | Mod: S$GLB,,, | Performed by: OPTOMETRIST

## 2023-12-22 PROCEDURE — 92012 PR EYE EXAM, EST PATIENT,INTERMED: ICD-10-PCS | Mod: S$GLB,,, | Performed by: OPTOMETRIST

## 2023-12-22 PROCEDURE — 1159F MED LIST DOCD IN RCRD: CPT | Mod: CPTII,S$GLB,, | Performed by: OPTOMETRIST

## 2023-12-22 PROCEDURE — 92012 INTRM OPH EXAM EST PATIENT: CPT | Mod: S$GLB,,, | Performed by: OPTOMETRIST

## 2023-12-22 PROCEDURE — 99999 PR PBB SHADOW E&M-EST. PATIENT-LVL III: ICD-10-PCS | Mod: PBBFAC,,, | Performed by: OPTOMETRIST

## 2023-12-22 PROCEDURE — 92015 PR REFRACTION: ICD-10-PCS | Mod: S$GLB,,, | Performed by: OPTOMETRIST

## 2023-12-22 NOTE — PROGRESS NOTES
Assessment /Plan     For exam results, see Encounter Report.    Refractive error      Contact lens Rx released to pt. Daily wear only advised, with education to risks of extended wear.  Discussed lens care, compliance and solutions. RTC yearly contact lens follow up.

## 2023-12-22 NOTE — PROGRESS NOTES
HPI    Pt here for annual eye exam with CL DLS- 12/05/22    Pt states her va is stable with correction, happy with CL .   Denies F/F and GTTS.   Last edited by Noemí Jones on 12/22/2023  8:10 AM.            Assessment /Plan     For exam results, see Encounter Report.    Refractive error      New Spectacle Rx given and Contact lens Rx released to pt. Daily wear only advised, with education to risks of extended wear.  Discussed lens care, compliance and solutions. RTC yearly contact lens follow up. , discussed different options for glasses. RTC 1 year routine eye exam with dfe, pt chose to defer, optos done.

## 2023-12-27 ENCOUNTER — PATIENT MESSAGE (OUTPATIENT)
Dept: FAMILY MEDICINE | Facility: CLINIC | Age: 38
End: 2023-12-27
Payer: COMMERCIAL

## 2023-12-27 ENCOUNTER — PATIENT MESSAGE (OUTPATIENT)
Dept: OPTOMETRY | Facility: CLINIC | Age: 38
End: 2023-12-27
Payer: COMMERCIAL

## 2024-01-09 ENCOUNTER — OFFICE VISIT (OUTPATIENT)
Dept: OBSTETRICS AND GYNECOLOGY | Facility: CLINIC | Age: 39
End: 2024-01-09
Payer: COMMERCIAL

## 2024-01-09 ENCOUNTER — OFFICE VISIT (OUTPATIENT)
Dept: FAMILY MEDICINE | Facility: CLINIC | Age: 39
End: 2024-01-09
Payer: COMMERCIAL

## 2024-01-09 VITALS
HEIGHT: 62 IN | HEART RATE: 88 BPM | SYSTOLIC BLOOD PRESSURE: 126 MMHG | WEIGHT: 180 LBS | BODY MASS INDEX: 33.13 KG/M2 | TEMPERATURE: 98 F | DIASTOLIC BLOOD PRESSURE: 85 MMHG

## 2024-01-09 VITALS
BODY MASS INDEX: 30.86 KG/M2 | DIASTOLIC BLOOD PRESSURE: 82 MMHG | SYSTOLIC BLOOD PRESSURE: 116 MMHG | HEIGHT: 64 IN | WEIGHT: 180.75 LBS

## 2024-01-09 DIAGNOSIS — E78.2 MIXED HYPERLIPIDEMIA: ICD-10-CM

## 2024-01-09 DIAGNOSIS — Z01.419 ENCOUNTER FOR ANNUAL ROUTINE GYNECOLOGICAL EXAMINATION: Primary | ICD-10-CM

## 2024-01-09 DIAGNOSIS — E03.9 HYPOTHYROIDISM, UNSPECIFIED TYPE: ICD-10-CM

## 2024-01-09 DIAGNOSIS — N92.6 IRREGULAR MENSES: ICD-10-CM

## 2024-01-09 DIAGNOSIS — Z00.00 ENCOUNTER FOR ANNUAL HEALTH EXAMINATION: Primary | ICD-10-CM

## 2024-01-09 DIAGNOSIS — F98.8 ATTENTION DEFICIT DISORDER, UNSPECIFIED HYPERACTIVITY PRESENCE: ICD-10-CM

## 2024-01-09 DIAGNOSIS — F41.8 DEPRESSION WITH ANXIETY: ICD-10-CM

## 2024-01-09 PROCEDURE — 3008F BODY MASS INDEX DOCD: CPT | Mod: CPTII,S$GLB,, | Performed by: PHYSICIAN ASSISTANT

## 2024-01-09 PROCEDURE — 1160F RVW MEDS BY RX/DR IN RCRD: CPT | Mod: CPTII,S$GLB,, | Performed by: PHYSICIAN ASSISTANT

## 2024-01-09 PROCEDURE — 99395 PREV VISIT EST AGE 18-39: CPT | Mod: S$GLB,,, | Performed by: PHYSICIAN ASSISTANT

## 2024-01-09 PROCEDURE — 3074F SYST BP LT 130 MM HG: CPT | Mod: CPTII,S$GLB,, | Performed by: PHYSICIAN ASSISTANT

## 2024-01-09 PROCEDURE — 3079F DIAST BP 80-89 MM HG: CPT | Mod: CPTII,S$GLB,, | Performed by: PHYSICIAN ASSISTANT

## 2024-01-09 PROCEDURE — 1159F MED LIST DOCD IN RCRD: CPT | Mod: CPTII,S$GLB,, | Performed by: PHYSICIAN ASSISTANT

## 2024-01-09 PROCEDURE — 99999 PR PBB SHADOW E&M-EST. PATIENT-LVL V: CPT | Mod: PBBFAC,,, | Performed by: PHYSICIAN ASSISTANT

## 2024-01-09 PROCEDURE — 3008F BODY MASS INDEX DOCD: CPT | Mod: CPTII,S$GLB,, | Performed by: OBSTETRICS & GYNECOLOGY

## 2024-01-09 PROCEDURE — 3079F DIAST BP 80-89 MM HG: CPT | Mod: CPTII,S$GLB,, | Performed by: OBSTETRICS & GYNECOLOGY

## 2024-01-09 PROCEDURE — 88175 CYTOPATH C/V AUTO FLUID REDO: CPT | Performed by: OBSTETRICS & GYNECOLOGY

## 2024-01-09 PROCEDURE — 99999 PR PBB SHADOW E&M-EST. PATIENT-LVL III: CPT | Mod: PBBFAC,,, | Performed by: OBSTETRICS & GYNECOLOGY

## 2024-01-09 PROCEDURE — 3074F SYST BP LT 130 MM HG: CPT | Mod: CPTII,S$GLB,, | Performed by: OBSTETRICS & GYNECOLOGY

## 2024-01-09 PROCEDURE — 99395 PREV VISIT EST AGE 18-39: CPT | Mod: S$GLB,,, | Performed by: OBSTETRICS & GYNECOLOGY

## 2024-01-09 PROCEDURE — 1160F RVW MEDS BY RX/DR IN RCRD: CPT | Mod: CPTII,S$GLB,, | Performed by: OBSTETRICS & GYNECOLOGY

## 2024-01-09 PROCEDURE — 1159F MED LIST DOCD IN RCRD: CPT | Mod: CPTII,S$GLB,, | Performed by: OBSTETRICS & GYNECOLOGY

## 2024-01-09 RX ORDER — DEXTROAMPHETAMINE SACCHARATE, AMPHETAMINE ASPARTATE, DEXTROAMPHETAMINE SULFATE AND AMPHETAMINE SULFATE 5; 5; 5; 5 MG/1; MG/1; MG/1; MG/1
1 TABLET ORAL 2 TIMES DAILY
Qty: 60 TABLET | Refills: 0 | Status: SHIPPED | OUTPATIENT
Start: 2024-03-09 | End: 2024-04-08

## 2024-01-09 RX ORDER — DEXTROAMPHETAMINE SACCHARATE, AMPHETAMINE ASPARTATE, DEXTROAMPHETAMINE SULFATE AND AMPHETAMINE SULFATE 5; 5; 5; 5 MG/1; MG/1; MG/1; MG/1
1 TABLET ORAL 2 TIMES DAILY
Qty: 60 TABLET | Refills: 0 | Status: SHIPPED | OUTPATIENT
Start: 2024-02-08 | End: 2024-03-09

## 2024-01-09 RX ORDER — DEXTROAMPHETAMINE SACCHARATE, AMPHETAMINE ASPARTATE, DEXTROAMPHETAMINE SULFATE AND AMPHETAMINE SULFATE 5; 5; 5; 5 MG/1; MG/1; MG/1; MG/1
1 TABLET ORAL 2 TIMES DAILY
Qty: 60 TABLET | Refills: 0 | Status: SHIPPED | OUTPATIENT
Start: 2024-01-09 | End: 2024-02-08

## 2024-01-09 NOTE — PATIENT INSTRUCTIONS
Psychiatry, Psychotherapy, Licensed Counselors    [facility]  [place]: [phone]  [place]: [phone]  [place]: [phone]  [place]: [phone]    [facility]   [address], [place] [address]  [place] [phone]    [facility]  [address]  [place] [id number] [phone]  Leeann Lopez, Ph.D., M.P  Taj Zuleta, Ph.D., M.P      [facility]  [address] [place] [address]  S[facility]  [phone]  Kiah Zuluaga LPC, [place]  [phone]        Janis Oseguera, [facility], LMFT  [address]  [address]  [place] [address] [phone]        Carlene French MA, LPC  Phone: [phone]  Fax: [phone]  7yrs- Adult  Areas of Service: Depression, anxiety, medication management, substance abuse, anger management, coping with grief, communication skills, parenting skills, and addiction.  Accepts private insurance and cash payments        Dr. James Heaton- [facility] [place]  [facility]  [address]   [address]  [place], [place] [address] [phone]  ACCEPTED INSURANCE:  [facility]  Aetna  [facility]  [facility]  Com Psych  Humana  [place]  [facility]  Value Options        Salazar Jackson LPC  Hindu counselor  [facility]  [address] [place], [place] [id number] [phone]  AVG Cost per Session: $80 - $130  ACCEPTED INSURANCE:  BC  Value Options  (License to [facility])          [facility]  [address] [place] [address]  Accept Medicaid  Other [facility]  Jordyn Lees C.H.[place] RAPPH.C  [facility]  [place] [facility].  [place]  C.H.C  [place] [facility]  [place] [facility]    Our skilled providers specialize in treating:  PTSD, Anxiety and Depression, Bipolar Disorder, ADHD, Obesity, Marital Distress, Chronic Tension, Panic Attacks, as well as Phobias          [facility]  [address] [place] [address]  Hours: Monday-Friday, 8 a.m.-5 p.m  Phone: [phone]  [facility]  [address] [place] [address]  Tel. [phone]  Fax [phone]  [facility]  [address] [place] [address]  Tel. [phone]  Fax [phone]  [facility]  [address] [place] [address]  Tel. [phone]  Fax [phone]  [facility]  [address] [place], [place] [address] [place] [address]  Tel. [phone]  Fax [phone]  Walk -In till 4pm all insurances accepted      [facility]  Our skilled providers specialize in treating:  Attention Deficit Hyperactivity Disorder  Attention Deficit Disorder  Obsessive Compulsive Disorder   Autism  Bipolar Disorder  Depression   Anxiety  A variety of other psychological disorders  [place]  Phone: [phone]  [place]  [address] [place], [place], [address]  [phone]  [place]  [address], [place], [address]  [phone]  [place]  [address], [place], [address]  [phone]  [facility]  [address] [place] [address]  [phone]  ACCEPTED INSURANCE:  AETNA  [facility]  CIGNA  [place]  HUMANA  [place]  [place]    MULTIPLAN  PHCS UNITED HEALTHCARE [facility] [place]  MD Luis Carlos Bhat NP Elisa Himel, NP  The providers of [facility] [place] help patients age 16 and over with the treatment of a variety of  mental disorders, including:  Stress  Depression  Anxiety  Schizophrenia  Dementia  Bipolar  Developmental disabilities  Other psychiatric mental health issues  [facility] [place]   [address]   [place] [address]   Hours: Monday-Friday, 8 a.m.-5 p.m.   Phone: [phone]   Fax: [phone]  ACCEPTED INSURANCE  [facility]O, OGB-PPO)*  Humana  Medicare  *PPO: Preferred Provider Organization        Medicaid [facility]: [phone]  [facility]: [phone]  [facility]: [phone]  [facility]: [phone]  [facility]: [phone]      [facility]   [facility]   Addiction   [address] [facility] [place] [address] [phone]

## 2024-01-09 NOTE — PROGRESS NOTES
This note is specifically for wellness visit performed today.       Assessment / Plan:      Patient here for annual wellness exam. Health maintenance was reviewed and ordered.    Complete history and physical was completed today. Complete and thorough medication reconciliation was performed. Discussed risks and benefits of medications. Advised patient on orders and health maintenance. Continue current medications listed on your summary sheet.    All questions were answered. Patient had no further concerns. Advised of diagnoses and plan.     Problem List Items Addressed This Visit          Psychiatric    Attention deficit disorder    Overview     -chronic; stable on current medication  -currently on adderall 20 mg BID  -denies AE of medications  -The patient was checked in the [facility] [employer] of Pharmacy's Prescription Monitoring Program. There appears to be no incongruities with the patient's verbalized history.          Relevant Medications    dextroamphetamine-amphetamine (ADDERALL) 20 mg tablet (Start on 3/9/2024)    dextroamphetamine-amphetamine (ADDERALL) 20 mg tablet (Start on 2/8/2024)    dextroamphetamine-amphetamine (ADDERALL) 20 mg tablet    Other Relevant Orders    CBC Auto Differential    Comprehensive Metabolic Panel    Ambulatory referral/consult to Psychiatry    Depression with anxiety    Overview     -previously followed by Dr. Lopez with psychiatry but no longer seeing psychiatry  -requesting referral to [facility]   -symptoms controlled with zoloft 75 mg QD  -denies AE of medications  -plan to continue           Relevant Orders    CBC Auto Differential    Comprehensive Metabolic Panel    Ambulatory referral/consult to Psychiatry       Cardiac/Vascular    Mixed hyperlipidemia    Overview     -chronic condition. Currently stable.    -managed with diet and exercise  -most recent labs listed below:  Lab Results   Component Value Date    CHOL 274 (H) 12/05/2022     Lab Results   Component  Value Date    HDL 83 (H) 2022     Lab Results   Component Value Date    LDLCALC 178.6 (H) 2022     Lab Results   Component Value Date    TRIG 62 2022     Lab Results   Component Value Date    ALT 15 10/07/2022    AST 22 10/07/2022    ALKPHOS 67 10/07/2022    BILITOT 0.9 10/07/2022            Relevant Orders    Lipid Panel       Endocrine    Hypothyroidism    Overview     Lab Results   Component Value Date    TSH 4.039 (H) 2022   -remains on Synthroid 137 mcg daily  -followed by Dr. Pool mcneal         Relevant Orders    TSH     Other Visit Diagnoses       Encounter for annual health examination    -  Primary    Relevant Orders    CBC Auto Differential    Comprehensive Metabolic Panel            Follow up in about 3 months (around 2024), or if symptoms worsen or fail to improve, for Virtual Visit.    Aleida Wallace PA-C      WELLNESS EXAM      Patient ID: Monika Connell is a 38 y.o. female.  has a past medical history of ADHD (attention deficit hyperactivity disorder), Allergy, Anxiety, Depression, and Thyroid disease.     Chief Complaint:  Encounter for wellness exam    Well Adult Physical: Patient here for a comprehensive physical exam.     Health Maintenance Topics with due status: Not Due       Topic Last Completion Date    TETANUS VACCINE 2021    Cervical Cancer Screening 2022    Hemoglobin A1c (Diabetic Prevention Screening) 2022        ==============================================    Health Maintenance Due   Topic Date Due    Influenza Vaccine (1) 2023    COVID-19 Vaccine ( season) 2023       Past Medical History:   Diagnosis Date    ADHD (attention deficit hyperactivity disorder)     Allergy     Anxiety     Depression     Thyroid disease      Past Surgical History:   Procedure Laterality Date     SECTION       SECTION N/A 2021    Procedure:  SECTION;  Surgeon: Sai Harris MD;  Location: [facility];   Service: OB/GYN;  Laterality: N/A;    EYE SURGERY  one at 5 yo and one at 9 yo    STRABISMUS SURGERY       Review of patient's allergies indicates:   Allergen Reactions    Pcn [penicillins] Hives    Clindamycin Itching and Rash     Current Outpatient Medications on File Prior to Visit   Medication Sig Dispense Refill    dextroamphetamine-amphetamine (ADDERALL) 20 mg tablet Take 1 tablet by mouth 2 (two) times daily. 60 tablet 0    multivit with min-folic acid 200 mcg Chew Take 1 tablet by mouth once daily.      sertraline (ZOLOFT) 25 MG tablet Take 1 tablet (25 mg total) by mouth once daily. 90 tablet 3    sertraline (ZOLOFT) 50 MG tablet TAKE 1 TABLET(50 MG) BY MOUTH EVERY DAY 90 tablet 1    SYNTHROID 137 mcg Tab tablet Take 1 tablet (137 mcg total) by mouth before breakfast. 30 tablet 11    progesterone (PROMETRIUM) 200 MG capsule Take 1 capsule (200 mg total) by mouth nightly. 12 capsule 11     No current facility-administered medications on file prior to visit.     Social History     Socioeconomic History    Marital status:    Occupational History     Employer: OTHER   Tobacco Use    Smoking status: Former     Current packs/day: 0.00     Types: Cigarettes     Quit date: 2011     Years since quittin.1    Smokeless tobacco: Never   Substance and Sexual Activity    Alcohol use: No    Drug use: Never    Sexual activity: Yes     Partners: Male     Birth control/protection: Condom     Social Determinants of Health     Financial Resource Strain: Patient Declined (2023)    Overall Financial Resource Strain (CARDIA)     Difficulty of Paying Living Expenses: Patient declined   Food Insecurity: Patient Declined (2023)    Hunger Vital Sign     Worried About Running Out of Food in the Last Year: Patient declined     Ran Out of Food in the Last Year: Patient declined   Transportation Needs: Patient Declined (2023)    PRAPARE - Transportation     Lack of Transportation (Medical): Patient  declined     Lack of Transportation (Non-Medical): Patient declined   Physical Activity: Unknown (12/21/2023)    Exercise Vital Sign     Days of Exercise per Week: Patient declined   Stress: Patient Declined (12/21/2023)    [employer] - Occupational Stress Questionnaire     Feeling of Stress : Patient declined   Social Connections: Unknown (12/21/2023)    Social Connection and Isolation Panel [NHANES]     Frequency of Communication with Friends and Family: Patient declined     Frequency of Social Gatherings with Friends and Family: Patient declined     Active Member of Clubs or Organizations: Patient declined     Attends Club or Organization Meetings: Patient declined     Marital Status: Patient declined   Housing Stability: Patient Declined (12/21/2023)    Housing Stability Vital Sign     Unable to Pay for Housing in the Last Year: Patient declined     Unstable Housing in the Last Year: Patient declined     Family History   Problem Relation Age of Onset    Macular degeneration Maternal Grandmother     Breast cancer Paternal Grandmother     Macular degeneration Paternal Grandfather     Ovarian cancer Neg Hx     Glaucoma Neg Hx        Review of Systems   Constitutional:  Negative for chills, fever and malaise/fatigue.   HENT:  Negative for hearing loss.    Eyes:  Negative for discharge.   Respiratory:  Negative for cough, shortness of breath and wheezing.    Cardiovascular:  Negative for chest pain, palpitations and leg swelling.   Gastrointestinal:  Negative for abdominal pain, blood in stool, constipation, diarrhea and vomiting.   Genitourinary:  Negative for dysuria, frequency and hematuria.   Musculoskeletal:  Negative for back pain, joint pain and neck pain.   Neurological:  Negative for weakness and headaches.   Endo/Heme/Allergies:  Negative for polydipsia.   Psychiatric/Behavioral:  Negative for depression. The patient is not nervous/anxious.            Objective:      Vitals:     01/09/24 1257   BP: 126/85   Pulse: 88   Temp: 97.9 °F (36.6 °C)     Body mass index is 32.92 kg/m².     Physical Exam  Constitutional:       General: She is not in acute distress.     Appearance: Normal appearance. She is well-developed.   HENT:      Head: Normocephalic and atraumatic.   Eyes:      Conjunctiva/sclera: Conjunctivae normal.   Cardiovascular:      Rate and Rhythm: Normal rate and regular rhythm.      Pulses: Normal pulses.      Heart sounds: Normal heart sounds. No murmur heard.  Pulmonary:      Effort: Pulmonary effort is normal. No respiratory distress.      Breath sounds: Normal breath sounds.   Abdominal:      General: Bowel sounds are normal. There is no distension.      Palpations: Abdomen is soft.      Tenderness: There is no abdominal tenderness.   Musculoskeletal:         General: Normal range of motion.      Cervical back: Normal range of motion and neck supple.   Skin:     General: Skin is warm and dry.      Findings: No rash.   Neurological:      General: No focal deficit present.      Mental Status: She is alert and oriented to person, place, and time.   Psychiatric:         Mood and Affect: Mood normal.         Behavior: Behavior normal.             All labs, imaging and procedures performed since last visit have been personally reviewed.

## 2024-01-09 NOTE — PROGRESS NOTES
Chief Complaint   Patient presents with    Annual Exam    Well Woman       History and Physical:  Patient's last menstrual period was 2023 (approximate).       Monika Connell is a 38 y.o.  WF who presents today for her routine annual GYN exam. The patient has no Gynecology complaints today. Irreg menses, didn't take prom., declines tx      Allergies:   Review of patient's allergies indicates:   Allergen Reactions    Pcn [penicillins] Hives    Clindamycin Itching and Rash       Past Medical History:   Diagnosis Date    ADHD (attention deficit hyperactivity disorder)     Allergy     Anxiety     Depression     Thyroid disease        Past Surgical History:   Procedure Laterality Date     SECTION       SECTION N/A 2021    Procedure:  SECTION;  Surgeon: Sai Harris MD;  Location: [facility];  Service: OB/GYN;  Laterality: N/A;    EYE SURGERY  one at 5 yo and one at 11 yo    STRABISMUS SURGERY         MEDS:   Current Outpatient Medications on File Prior to Visit   Medication Sig Dispense Refill    dextroamphetamine-amphetamine (ADDERALL) 20 mg tablet Take 1 tablet by mouth 2 (two) times daily. 60 tablet 0    [START ON 3/9/2024] dextroamphetamine-amphetamine (ADDERALL) 20 mg tablet Take 1 tablet by mouth 2 (two) times daily. 60 tablet 0    [START ON 2024] dextroamphetamine-amphetamine (ADDERALL) 20 mg tablet Take 1 tablet by mouth 2 (two) times daily. 60 tablet 0    dextroamphetamine-amphetamine (ADDERALL) 20 mg tablet Take 1 tablet by mouth 2 (two) times daily. 60 tablet 0    multivit with min-folic acid 200 mcg Chew Take 1 tablet by mouth once daily.      sertraline (ZOLOFT) 25 MG tablet Take 1 tablet (25 mg total) by mouth once daily. 90 tablet 3    sertraline (ZOLOFT) 50 MG tablet TAKE 1 TABLET(50 MG) BY MOUTH EVERY DAY 90 tablet 1    SYNTHROID 137 mcg Tab tablet Take 1 tablet (137 mcg total) by mouth before breakfast. 30 tablet 11    progesterone (PROMETRIUM) 200 MG  capsule Take 1 capsule (200 mg total) by mouth nightly. 12 capsule 11     No current facility-administered medications on file prior to visit.       OB History          2    Para   2    Term   2            AB        Living   2         SAB        IAB        Ectopic        Multiple   0    Live Births   2                 Social History     Socioeconomic History    Marital status:    Occupational History     Employer: OTHER   Tobacco Use    Smoking status: Former     Current packs/day: 0.00     Types: Cigarettes     Quit date: 2011     Years since quittin.1    Smokeless tobacco: Never   Substance and Sexual Activity    Alcohol use: No    Drug use: Never    Sexual activity: Yes     Partners: Male     Birth control/protection: Condom     Social Determinants of Health     Financial Resource Strain: Patient Declined (2023)    Overall Financial Resource Strain (CARDIA)     Difficulty of Paying Living Expenses: Patient declined   Food Insecurity: Patient Declined (2023)    Hunger Vital Sign     Worried About Running Out of Food in the Last Year: Patient declined     Ran Out of Food in the Last Year: Patient declined   Transportation Needs: Patient Declined (2023)    PRAPARE - Transportation     Lack of Transportation (Medical): Patient declined     Lack of Transportation (Non-Medical): Patient declined   Physical Activity: Unknown (2023)    Exercise Vital Sign     Days of Exercise per Week: Patient declined   Stress: Patient Declined (2023)    [employer] - Occupational Stress Questionnaire     Feeling of Stress : Patient declined   Social Connections: Unknown (2023)    Social Connection and Isolation Panel [NHANES]     Frequency of Communication with Friends and Family: Patient declined     Frequency of Social Gatherings with Friends and Family: Patient declined     Active Member of Clubs or Organizations: Patient declined      "Attends Club or Organization Meetings: Patient declined     Marital Status: Patient declined   Housing Stability: Patient Declined (12/21/2023)    Housing Stability Vital Sign     Unable to Pay for Housing in the Last Year: Patient declined     Unstable Housing in the Last Year: Patient declined       Family History   Problem Relation Age of Onset    Macular degeneration Maternal Grandmother     Breast cancer Paternal Grandmother     Macular degeneration Paternal Grandfather     Ovarian cancer Neg Hx     Glaucoma Neg Hx          Past medical and surgical history reviewed.   I have reviewed the patient's medical history in detail and updated the computerized patient record.        Review of System:   General: no chills, fever, night sweats, weight gain or weight loss  Psychological: no depression or suicidal ideation  Breasts: no new or changing breast lumps, nipple discharge or masses.  Respiratory: no cough, shortness of breath, or wheezing  Cardiovascular: no chest pain or dyspnea on exertion  Gastrointestinal: no abdominal pain, change in bowel habits, or black or bloody stools  Genito-Urinary: no incontinence, urinary frequency/urgency or vulvar/vaginal symptoms, pelvic pain or abnormal vaginal bleeding.  Musculoskeletal: no gait disturbance or muscular weakness      Physical Exam:   /82   Ht 5' 4" (1.626 m)   Wt 82 kg (180 lb 12.4 oz)   LMP 12/09/2023 (Approximate)   BMI 31.03 kg/m²   Constitutional: She appears alert and responsive. She appears well-developed, well-groomed, and well-nourished. No distress.   HENT:   Head: Normocephalic and atraumatic.   Eyes: Conjunctivae and EOM are normal. No scleral icterus.   Neck: Symmetrical. Normal range of motion. Neck supple. No tracheal deviation present. THYROID:  without masses or tenderness.  Cardiovascular: Normal rate, no rhythm abnormality noted. Extremities without swelling or edema, warm.    Pulmonary/Chest: Normal respiratory Effort. No distress or " retractions. She exhibits no tenderness.  Breasts: are symmetrical.no masses    Right breast exhibits no inverted nipple, no mass, no nipple discharge, no skin change and no tenderness.   Left breast exhibits no inverted nipple, no mass, no nipple discharge, no skin change and no tenderness.  Abdominal: Soft. She exhibits no distension, hernias or masses. There is no tenderness. No enlargement of liver edge or spleen.  There is no rebound and no guarding.   Genitourinary:    External rectal exam shows no thrombosed external hemorrhoids, no lesions.     Pelvic exam was performed with patient supine.   No labial fusion, and symmetrical.    There is no rash, lesion or injury on the right labia.   There is no rash, lesion or injury on the left labia.   No bleeding and no signs of injury around the vaginal introitus, urethral meatus is normal size and without prolapse or lesions, urethra well supported. The cervix is visualized with no discharge, lesions or friability.   No vaginal discharge found.    No significant Cystocele, Enterocele or rectocele, and cervix and uterus well supported.   Bimanual exam:   The urethra is normal to palpation and there are no palpable vaginal wall masses.   Uterus is not deviated, not enlarged, not fixed, normal shape and not tender.   Cervix exhibits no motion tenderness.    Right adnexum displays no mass or nodularity and no tenderness.   Left adnexum displays no mass or nodularity and no tenderness.  Musculoskeletal: Normal range of motion.   Lymphadenopathy: No inguinal adenopathy present.   Neurological: She is alert and oriented to person, place, and time. Coordination normal.   Skin: Skin is warm and dry. She is not diaphoretic. No rashes, lesions or ulcers.   Psychiatric: She has a normal mood and affect, oriented to person, place, and time.      Assessment:   Normal annual GYN exam  1. Encounter for annual routine gynecological examination        2. Irregular menses           dermatitis    Plan:   PAP  Consider OCPs, minipill or prom for cycle control  Derm eval rec  F/u Dr Lanaz for thyroid  Follow up in 1 year.  Patient informed will be contacted with results within 2 weeks. Encouraged to please call back or email if she has not heard from us by then.

## 2024-01-10 ENCOUNTER — TELEPHONE (OUTPATIENT)
Dept: PULMONOLOGY | Facility: CLINIC | Age: 39
End: 2024-01-10
Payer: COMMERCIAL

## 2024-01-10 ENCOUNTER — LAB VISIT (OUTPATIENT)
Dept: LAB | Facility: HOSPITAL | Age: 39
End: 2024-01-10
Attending: PHYSICIAN ASSISTANT
Payer: COMMERCIAL

## 2024-01-10 DIAGNOSIS — E78.2 MIXED HYPERLIPIDEMIA: ICD-10-CM

## 2024-01-10 DIAGNOSIS — Z00.00 ENCOUNTER FOR ANNUAL HEALTH EXAMINATION: ICD-10-CM

## 2024-01-10 DIAGNOSIS — E03.9 HYPOTHYROIDISM, UNSPECIFIED TYPE: ICD-10-CM

## 2024-01-10 DIAGNOSIS — F41.8 DEPRESSION WITH ANXIETY: ICD-10-CM

## 2024-01-10 DIAGNOSIS — G47.30 SLEEP-DISORDERED BREATHING: Primary | ICD-10-CM

## 2024-01-10 DIAGNOSIS — F98.8 ATTENTION DEFICIT DISORDER, UNSPECIFIED HYPERACTIVITY PRESENCE: ICD-10-CM

## 2024-01-10 LAB
ALBUMIN SERPL BCP-MCNC: 3.7 G/DL (ref 3.5–5.2)
ALP SERPL-CCNC: 67 U/L (ref 55–135)
ALT SERPL W/O P-5'-P-CCNC: 24 U/L (ref 10–44)
ANION GAP SERPL CALC-SCNC: 9 MMOL/L (ref 8–16)
AST SERPL-CCNC: 25 U/L (ref 10–40)
BASOPHILS # BLD AUTO: 0.04 K/UL (ref 0–0.2)
BASOPHILS NFR BLD: 1 % (ref 0–1.9)
BILIRUB SERPL-MCNC: 0.7 MG/DL (ref 0.1–1)
BUN SERPL-MCNC: 13 MG/DL (ref 6–20)
CALCIUM SERPL-MCNC: 8.9 MG/DL (ref 8.7–10.5)
CHLORIDE SERPL-SCNC: 105 MMOL/L (ref 95–110)
CHOLEST SERPL-MCNC: 235 MG/DL (ref 120–199)
CHOLEST/HDLC SERPL: 4.1 {RATIO} (ref 2–5)
CO2 SERPL-SCNC: 23 MMOL/L (ref 23–29)
CREAT SERPL-MCNC: 0.7 MG/DL (ref 0.5–1.4)
DIFFERENTIAL METHOD BLD: ABNORMAL
EOSINOPHIL # BLD AUTO: 0.4 K/UL (ref 0–0.5)
EOSINOPHIL NFR BLD: 9.4 % (ref 0–8)
ERYTHROCYTE [DISTWIDTH] IN BLOOD BY AUTOMATED COUNT: 13.5 % (ref 11.5–14.5)
EST. GFR  (NO RACE VARIABLE): >60 ML/MIN/1.73 M^2
GLUCOSE SERPL-MCNC: 77 MG/DL (ref 70–110)
HCT VFR BLD AUTO: 37.8 % (ref 37–48.5)
HDLC SERPL-MCNC: 58 MG/DL (ref 40–75)
HDLC SERPL: 24.7 % (ref 20–50)
HGB BLD-MCNC: 12 G/DL (ref 12–16)
IMM GRANULOCYTES # BLD AUTO: 0 K/UL (ref 0–0.04)
IMM GRANULOCYTES NFR BLD AUTO: 0 % (ref 0–0.5)
LDLC SERPL CALC-MCNC: 160 MG/DL (ref 63–159)
LYMPHOCYTES # BLD AUTO: 1.6 K/UL (ref 1–4.8)
LYMPHOCYTES NFR BLD: 41.6 % (ref 18–48)
MCH RBC QN AUTO: 27.9 PG (ref 27–31)
MCHC RBC AUTO-ENTMCNC: 31.7 G/DL (ref 32–36)
MCV RBC AUTO: 88 FL (ref 82–98)
MONOCYTES # BLD AUTO: 0.3 K/UL (ref 0.3–1)
MONOCYTES NFR BLD: 8.8 % (ref 4–15)
NEUTROPHILS # BLD AUTO: 1.5 K/UL (ref 1.8–7.7)
NEUTROPHILS NFR BLD: 39.2 % (ref 38–73)
NONHDLC SERPL-MCNC: 177 MG/DL
NRBC BLD-RTO: 0 /100 WBC
PLATELET # BLD AUTO: 225 K/UL (ref 150–450)
PMV BLD AUTO: 9.3 FL (ref 9.2–12.9)
POTASSIUM SERPL-SCNC: 3.9 MMOL/L (ref 3.5–5.1)
PROT SERPL-MCNC: 7 G/DL (ref 6–8.4)
RBC # BLD AUTO: 4.3 M/UL (ref 4–5.4)
SODIUM SERPL-SCNC: 137 MMOL/L (ref 136–145)
TRIGL SERPL-MCNC: 85 MG/DL (ref 30–150)
TSH SERPL DL<=0.005 MIU/L-ACNC: 1.24 UIU/ML (ref 0.4–4)
WBC # BLD AUTO: 3.85 K/UL (ref 3.9–12.7)

## 2024-01-10 PROCEDURE — 36415 COLL VENOUS BLD VENIPUNCTURE: CPT | Mod: PO | Performed by: PHYSICIAN ASSISTANT

## 2024-01-10 PROCEDURE — 84443 ASSAY THYROID STIM HORMONE: CPT | Performed by: PHYSICIAN ASSISTANT

## 2024-01-10 PROCEDURE — 80053 COMPREHEN METABOLIC PANEL: CPT | Performed by: PHYSICIAN ASSISTANT

## 2024-01-10 PROCEDURE — 80061 LIPID PANEL: CPT | Performed by: PHYSICIAN ASSISTANT

## 2024-01-10 PROCEDURE — 85025 COMPLETE CBC W/AUTO DIFF WBC: CPT | Performed by: PHYSICIAN ASSISTANT

## 2024-01-10 RX ORDER — SERTRALINE HYDROCHLORIDE 25 MG/1
25 TABLET, FILM COATED ORAL
Qty: 90 TABLET | Refills: 3 | Status: SHIPPED | OUTPATIENT
Start: 2024-01-10

## 2024-01-10 NOTE — PROGRESS NOTES
Chief complaint:    Chief Complaint   Patient presents with    Other      To check if her nasal polyps have returned - having increased congestion and 3-4 sinus infections - surgery was approx 15 years ago           Referring Provider:  Self, Aaareferral  No address on file      History of present illness:     Ms. Connell is a 38 y.o. presenting for evaluation of sinus issues.     Has history of nasal polyps.    The patient reports the following allergy/sinus symptoms:     Major sinusitis symptoms:  Yes - Purulent anterior nasal discharge  No - Purulent or discolored posterior nasal discharge  Yes - Nasal congestion or obstruction  Yes - Facial Congestion or fullness  No - Hyposmia or anosmia  No - Fever    Symptoms have been present for many years, improved after surgery, but progressively worsening the last few years. In between acute episodes the patient's symptoms do not completely resolve.  Severity of symptoms: moderate to severe.   Treatment has included: abx/steroids 3-4 times this year, which give improvement after a while.  The patient has had about 3 or 4 sinus infections in the past 12 months.   Prior sinus surgery: FESS about 15 years ago.    Allergy history: denies any issues. Allergy testing for this patient has been done. No IT.    Asthma history: No    NSAID/ASA allergy: No     Current smoker:  No    Snoring, loud. Poro energy even with long night sleep. Witnessed apneas as well. Has HSAT scheduled.       History      Past Medical History:   Past Medical History:   Diagnosis Date    ADHD (attention deficit hyperactivity disorder)     Allergy     Anxiety     Depression     Thyroid disease          Past Surgical History:  Past Surgical History:   Procedure Laterality Date     SECTION       SECTION N/A 2021    Procedure:  SECTION;  Surgeon: Sai Harris MD;  Location: [facility];  Service: OB/GYN;  Laterality: N/A;    EYE SURGERY  one at 7 yo and one at 9 yo    STRABISMUS  SURGERY           Medications: Medication list reviewed. She  has a current medication list which includes the following prescription(s): [START ON 3/9/2024] dextroamphetamine-amphetamine, [START ON 2/8/2024] dextroamphetamine-amphetamine, dextroamphetamine-amphetamine, multivit with min-folic acid, sertraline, sertraline, synthroid, fluticasone propionate, levofloxacin, and progesterone.     Allergies:   Review of patient's allergies indicates:   Allergen Reactions    Pcn [penicillins] Hives    Clindamycin Itching and Rash         Family history: family history includes Breast cancer in her paternal grandmother; Macular degeneration in her maternal grandmother and paternal grandfather.         Social History          Alcohol use:  reports no history of alcohol use.            Tobacco:  reports that she quit smoking about 12 years ago. Her smoking use included cigarettes. She has never used smokeless tobacco.         Physical Examination      Vitals: Weight 81.9 kg (180 lb 8.9 oz), last menstrual period 12/09/2023.      General: Well developed, well nourished, well hydrated.     Voice: no dysphonia, no dysarthria      Head/Face: Normocephalic, atraumatic. No scars or lesions. Facial musculature equal.     Eyes: No scleral icterus or conjunctival hemorrhage. EOMI. PERRLA.     Ears:     Right ear: No gross deformity. EAC is clear of debris and erythema. TM are intact with a pneumatized middle ear. No signs of retraction, fluid or infection.      Left ear: No gross deformity. EAC is clear of debris and erythema. TM are intact with a pneumatized middle ear. No signs of retraction, fluid or infection.      Nose: No gross deformity or lesions. No purulent discharge. No significant NSD.     Mouth/Oropharynx: Lips without any lesions. No mucosal lesions within the oropharynx. No tonsillar exudate or lesions. Pharyngeal walls symmetrical. Uvula midline. Tongue midline without lesions.     Neurologic: Moving all extremities  without gross abnormality.CN II-XII grossly intact. House-Brackmann 1/6. No signs of nystagmus.          Data reviewed      Review of records:      I reviewed records from the referring provider's office visits describing the history, workup, and/or treatment of this problem thus far.    NASAL ENDOSCOPY       Indication: Monika Connell is a 38 y.o. female  with sinonasal symptoms that were not able to be explained by anterior rhinoscopy alone, thus necessitating nasal endoscopy.     Procedure: Risks, benefits, and alternatives of the procedure were discussed with the patient, and the patient consented to the nasal endoscopy.  The nasal cavity was sprayed with a topical decongestant and anesthetic (if needed). The endoscope was passed into each nostril and each nasal cavity was visualized.  On each side the nasal cavity, sinuses (if open), turbinates, middle and superior meatus, sphenoethmoidal recess and septum were examined with the findings described below. At the end of the examination, the scope was removed. The patient tolerated the procedure well with no complications.       Endoscopic Sinonasal Exam Findings:  -     The right side has moderate edema and anterior displacement of the uncinate, polypoid thickening vs polyps in the middle meatus.  -     The left side has marked edema with polypoid changes  -     Nasal secretions: purulent secretions bilaterally  -     Nasal septum: deviated to the right moderately  -     Inferior turbinate: Normal mucosa without significant hypertrophy bilaterally  -     Middle turbinate: Normal mucosa without significant hypertrophy bilaterally  -     Other findings: none      Assessment/Plan:    1. Chronic sinusitis, unspecified location    2. Nasal polyposis        ,Monika  presents today for initial evaluation. The patient presents with significant evidence of chronic sinusitis with nasal polypsis. My recommendation is treatment of Chronic Rhinosinusitis with a course of  oral steroids and prolonged course of antibiotics. The patient will also adhere to an aggressive sinus protocol consisting of a short course of Afrin, and daily Flonase and saline irrigations (Sinus protocol instructions were provided). The patient will return in 3-4 weeks after completion of treatment with a CT sinus. We discussed the possible need for sinus surgery in the event of persistent sinusitis resistant to maximal medical management. We discussed at length the risk of sinus surgery including, but not limited to, recurrence of disease, bleeding, infection, septal perforation, tooth or cheek numbness, vision changes, orbital injury, CSF leak and changes in smell.  Furthermore, we discussed that Chronic Rhinosinusitis is a chronic disease, and while surgery is often part of the treatment algorithm, continued medical management is imperative to reduce the risk of recurrence and increase the number of symptom free days. The patient had opportunity to ask questions and I answered all of them to their satisfaction.             Yan Juarez MD  [facility]   [facility]  [id number] [place] [address].  [facility] Call [id number]  P: [phone]  F: [phone]

## 2024-01-10 NOTE — TELEPHONE ENCOUNTER
No care due was identified.  [facility] Embedded Care Due Messages. Reference number: [id number].   1/10/2024 8:52:27 AM CST

## 2024-01-10 NOTE — TELEPHONE ENCOUNTER
Refill Routing Note   Medication(s) are not appropriate for processing by [facility] for the following reason(s):        Drug-drug interaction    [facility] action(s):  Defer        Medication Therapy Plan: sertraline    Pharmacist review requested: Yes     Appointments  past 12m or future 3m with PCP    Date Provider   Last Visit   10/12/2023 Kika Casarez MD   Next Visit   Visit date not found Kika Casarez MD   ED visits in past 90 days: 0        Note composed:10:26 AM 01/10/2024

## 2024-01-10 NOTE — TELEPHONE ENCOUNTER
Refill Decision Note   Monika Ko  is requesting a refill authorization.  Brief Assessment and Rationale for Refill:  Approve     Medication Therapy Plan:  patient takes both 50 and 25mg    Medication Reconciliation Completed: No   Comments:     No Care Gaps recommended.     Note composed:5:09 PM 01/10/2024

## 2024-01-11 ENCOUNTER — OFFICE VISIT (OUTPATIENT)
Dept: OTOLARYNGOLOGY | Facility: CLINIC | Age: 39
End: 2024-01-11
Payer: COMMERCIAL

## 2024-01-11 ENCOUNTER — OFFICE VISIT (OUTPATIENT)
Dept: SLEEP MEDICINE | Facility: CLINIC | Age: 39
End: 2024-01-11
Payer: COMMERCIAL

## 2024-01-11 ENCOUNTER — HOSPITAL ENCOUNTER (OUTPATIENT)
Dept: RADIOLOGY | Facility: HOSPITAL | Age: 39
Discharge: HOME | End: 2024-01-11
Attending: INTERNAL MEDICINE
Payer: COMMERCIAL

## 2024-01-11 ENCOUNTER — PATIENT MESSAGE (OUTPATIENT)
Dept: SLEEP MEDICINE | Facility: CLINIC | Age: 39
End: 2024-01-11

## 2024-01-11 ENCOUNTER — PATIENT OUTREACH (OUTPATIENT)
Dept: ADMINISTRATIVE | Facility: HOSPITAL | Age: 39
End: 2024-01-11
Payer: COMMERCIAL

## 2024-01-11 VITALS — BODY MASS INDEX: 30.99 KG/M2 | WEIGHT: 180.56 LBS

## 2024-01-11 VITALS
SYSTOLIC BLOOD PRESSURE: 120 MMHG | HEIGHT: 64 IN | RESPIRATION RATE: 18 BRPM | HEART RATE: 102 BPM | OXYGEN SATURATION: 98 % | BODY MASS INDEX: 30.83 KG/M2 | DIASTOLIC BLOOD PRESSURE: 78 MMHG | WEIGHT: 180.56 LBS

## 2024-01-11 DIAGNOSIS — R22.2 LOCALIZED SWELLING, MASS AND LUMP, TRUNK: ICD-10-CM

## 2024-01-11 DIAGNOSIS — E78.2 MIXED HYPERLIPIDEMIA: ICD-10-CM

## 2024-01-11 DIAGNOSIS — G47.33 OSA (OBSTRUCTIVE SLEEP APNEA): Primary | ICD-10-CM

## 2024-01-11 DIAGNOSIS — J32.9 CHRONIC SINUSITIS, UNSPECIFIED LOCATION: Primary | ICD-10-CM

## 2024-01-11 DIAGNOSIS — G47.33 OSA (OBSTRUCTIVE SLEEP APNEA): ICD-10-CM

## 2024-01-11 DIAGNOSIS — R93.89 ABNORMAL CHEST X-RAY: ICD-10-CM

## 2024-01-11 DIAGNOSIS — J33.9 NASAL POLYPOSIS: ICD-10-CM

## 2024-01-11 DIAGNOSIS — G47.30 SLEEP-DISORDERED BREATHING: ICD-10-CM

## 2024-01-11 DIAGNOSIS — E03.8 OTHER SPECIFIED HYPOTHYROIDISM: ICD-10-CM

## 2024-01-11 PROCEDURE — 3078F DIAST BP <80 MM HG: CPT | Mod: CPTII,S$GLB,, | Performed by: INTERNAL MEDICINE

## 2024-01-11 PROCEDURE — 99203 OFFICE O/P NEW LOW 30 MIN: CPT | Mod: S$GLB,,, | Performed by: INTERNAL MEDICINE

## 2024-01-11 PROCEDURE — 71048 X-RAY EXAM CHEST 4+ VIEWS: CPT | Mod: 26,,, | Performed by: RADIOLOGY

## 2024-01-11 PROCEDURE — 3074F SYST BP LT 130 MM HG: CPT | Mod: CPTII,S$GLB,, | Performed by: INTERNAL MEDICINE

## 2024-01-11 PROCEDURE — 71046 X-RAY EXAM CHEST 2 VIEWS: CPT | Mod: 59,TC

## 2024-01-11 PROCEDURE — 3008F BODY MASS INDEX DOCD: CPT | Mod: CPTII,S$GLB,, | Performed by: INTERNAL MEDICINE

## 2024-01-11 PROCEDURE — 99999 PR PBB SHADOW E&M-EST. PATIENT-LVL V: CPT | Mod: PBBFAC,,, | Performed by: INTERNAL MEDICINE

## 2024-01-11 PROCEDURE — 31231 NASAL ENDOSCOPY DX: CPT | Mod: S$GLB,,, | Performed by: STUDENT IN AN ORGANIZED HEALTH CARE EDUCATION/TRAINING PROGRAM

## 2024-01-11 PROCEDURE — 99204 OFFICE O/P NEW MOD 45 MIN: CPT | Mod: 25,S$GLB,, | Performed by: STUDENT IN AN ORGANIZED HEALTH CARE EDUCATION/TRAINING PROGRAM

## 2024-01-11 PROCEDURE — 71046 X-RAY EXAM CHEST 2 VIEWS: CPT | Mod: 26,59,, | Performed by: RADIOLOGY

## 2024-01-11 PROCEDURE — 3008F BODY MASS INDEX DOCD: CPT | Mod: CPTII,S$GLB,, | Performed by: STUDENT IN AN ORGANIZED HEALTH CARE EDUCATION/TRAINING PROGRAM

## 2024-01-11 PROCEDURE — 1159F MED LIST DOCD IN RCRD: CPT | Mod: CPTII,S$GLB,, | Performed by: INTERNAL MEDICINE

## 2024-01-11 PROCEDURE — 71048 X-RAY EXAM CHEST 4+ VIEWS: CPT | Mod: TC

## 2024-01-11 PROCEDURE — 1160F RVW MEDS BY RX/DR IN RCRD: CPT | Mod: CPTII,S$GLB,, | Performed by: INTERNAL MEDICINE

## 2024-01-11 PROCEDURE — 99999 PR PBB SHADOW E&M-EST. PATIENT-LVL II: CPT | Mod: PBBFAC,,, | Performed by: STUDENT IN AN ORGANIZED HEALTH CARE EDUCATION/TRAINING PROGRAM

## 2024-01-11 RX ORDER — FLUTICASONE PROPIONATE 50 MCG
1 SPRAY, SUSPENSION (ML) NASAL DAILY
Qty: 16 G | Refills: 0 | Status: SHIPPED | OUTPATIENT
Start: 2024-01-11

## 2024-01-11 RX ORDER — LEVOFLOXACIN 500 MG/1
500 TABLET, FILM COATED ORAL DAILY
Qty: 21 TABLET | Refills: 0 | Status: SHIPPED | OUTPATIENT
Start: 2024-01-11 | End: 2024-02-01

## 2024-01-11 NOTE — PROGRESS NOTES
Pulmonary Outpatient  Visit     Subjective:       Patient ID: Monika Connell is a 38 y.o. female.    Social History     Tobacco Use   Smoking Status Former    Current packs/day: 0.00    Types: Cigarettes    Quit date: 2011    Years since quittin.2   Smokeless Tobacco Never            Chief Complaint: Sleep Apnea and Abnormal Chest X-ray      Monika Connell is 38 y.o.  Referral  Concern about sleep issues  Seen Dentist watch Pat [facility] 43.9/hr  Study begun 9:55 p.m. and ended at 6:58 a.m..  6 hours 2 minutes of recording time.  Inconclusive REM detected   Majority of the sleep was in supine position   No respiratory symptoms  History of nasal surgery Dr. Beck  Unfortunately this report does not have signature or identification of interpreting physician  I did inform patient that report needs to have interpretation physician signature  Repeat test in our sleep lab will be ordered   by profession  Snoring, and fatigue  Bed time 10 pm  Wake time 7 am    No RLS  No signs of narcolepsy  Grind teeth at night : No mouth guard  No naps  Nasal surgery  Dr Beck                Review of Systems    Outpatient Encounter Medications as of 2024   Medication Sig Dispense Refill    dextroamphetamine-amphetamine (ADDERALL) 20 mg tablet Take 1 tablet by mouth 2 (two) times daily. 60 tablet 0    [START ON 3/9/2024] dextroamphetamine-amphetamine (ADDERALL) 20 mg tablet Take 1 tablet by mouth 2 (two) times daily. 60 tablet 0    [START ON 2024] dextroamphetamine-amphetamine (ADDERALL) 20 mg tablet Take 1 tablet by mouth 2 (two) times daily. 60 tablet 0    dextroamphetamine-amphetamine (ADDERALL) 20 mg tablet Take 1 tablet by mouth 2 (two) times daily. 60 tablet 0    multivit with min-folic acid 200 mcg Chew Take 1 tablet by mouth once daily.      progesterone (PROMETRIUM) 200 MG capsule Take 1 capsule (200 mg total) by mouth nightly. (Patient not taking: Reported on  2024) 12 capsule 11    sertraline (ZOLOFT) 25 MG tablet TAKE 1 TABLET(25 MG) BY MOUTH EVERY DAY 90 tablet 3    sertraline (ZOLOFT) 50 MG tablet TAKE 1 TABLET(50 MG) BY MOUTH EVERY DAY 90 tablet 1    SYNTHROID 137 mcg Tab tablet Take 1 tablet (137 mcg total) by mouth before breakfast. 30 tablet 11    [DISCONTINUED] sertraline (ZOLOFT) 25 MG tablet Take 1 tablet (25 mg total) by mouth once daily. 90 tablet 3     No facility-administered encounter medications on file as of 2024.       The following portions of the patient's history were reviewed and updated as appropriate: She  has a past medical history of ADHD (attention deficit hyperactivity disorder), Allergy, Anxiety, Depression, and Thyroid disease.  She does not have any pertinent problems on file.  She  has a past surgical history that includes  section;  section (N/A, 2021); Eye surgery (one at 7 yo and one at 9 yo); and Strabismus surgery.  Her family history includes Breast cancer in her paternal grandmother; Macular degeneration in her maternal grandmother and paternal grandfather.  She  reports that she quit smoking about 12 years ago. Her smoking use included cigarettes. She has never used smokeless tobacco. She reports that she does not drink alcohol and does not use drugs.  She has a current medication list which includes the following prescription(s): [START ON 3/9/2024] dextroamphetamine-amphetamine, [START ON 2024] dextroamphetamine-amphetamine, dextroamphetamine-amphetamine, fluticasone propionate, levofloxacin, multivit with min-folic acid, progesterone, sertraline, sertraline, and synthroid.  Current Outpatient Medications on File Prior to Visit   Medication Sig Dispense Refill    [START ON 3/9/2024] dextroamphetamine-amphetamine (ADDERALL) 20 mg tablet Take 1 tablet by mouth 2 (two) times daily. 60 tablet 0    [START ON 2024] dextroamphetamine-amphetamine (ADDERALL) 20 mg tablet Take 1 tablet by mouth 2  (two) times daily. 60 tablet 0    dextroamphetamine-amphetamine (ADDERALL) 20 mg tablet Take 1 tablet by mouth 2 (two) times daily. 60 tablet 0    multivit with min-folic acid 200 mcg Chew Take 1 tablet by mouth once daily.      progesterone (PROMETRIUM) 200 MG capsule Take 1 capsule (200 mg total) by mouth nightly. (Patient not taking: Reported on 1/11/2024) 12 capsule 11    sertraline (ZOLOFT) 25 MG tablet TAKE 1 TABLET(25 MG) BY MOUTH EVERY DAY 90 tablet 3    sertraline (ZOLOFT) 50 MG tablet TAKE 1 TABLET(50 MG) BY MOUTH EVERY DAY 90 tablet 1    SYNTHROID 137 mcg Tab tablet Take 1 tablet (137 mcg total) by mouth before breakfast. 30 tablet 11     No current facility-administered medications on file prior to visit.     She is allergic to pcn [penicillins] and clindamycin..      BP Readings from Last 3 Encounters:   01/11/24 120/78   01/09/24 116/82   01/09/24 126/85     Snoring / Sleep:     Brighton Questionnaire (validated SUE screening questionnaire)    YES -- Snoring/apnea    YES -- Fatigue    Body mass index is 30.99 kg/m².  (>25 is overweight, >30 is obese)    Blood Pressure = normal blood pressure  (PreHTN 120-139/80-89, Stg1 140-159/90-99, Stg2 >160/>100)  Brighton = 2 of three SUE categories are positive (high risk is 2-3 positive categories)     Clayton Sleepiness Scale TOTAL =          1/5/2024     9:53 PM   EPWORTH SLEEPINESS SCALE   Sitting and reading 0   Watching TV 1   Sitting, inactive in a public place (e.g. a theatre or a meeting) 0   As a passenger in a car for an hour without a break 1   Lying down to rest in the afternoon when circumstances permit 3   Sitting and talking to someone 0   Sitting quietly after a lunch without alcohol 0   In a car, while stopped for a few minutes in traffic 0   Total score 5      (validated sleepiness questionnaire with a higher score indicating greater sleepiness; range 0-24)      STOP-Bang Questionnaire (validated SUE screening questionnaire)  Negative unless  "checked off.  [x] Snoring    [x]  Tired/Fatigued/Sleepy  [x] Obstruction (apneas/choking)  [] Pressure (HTN)  [] BMI >35  [] Age >50  [] Neck >40 cm  [] Gender male   STOP-Bang = 3 (low risk 0-2,high risk 3-8)    Neck circumference 14"      MMRC Dyspnea Scale (4 is worst)     [x] MMRC 0: Dyspneic on strenuous excercise (0 points)    [] MMRC 1: Dyspneic on walking a slight hill (0 points)    [] MMRC 2: Dyspneic on walking level ground; must stop occasionally due to breathlessness (1 point)    [] MMRC 3: Must stop for breathlessness after walking 100 yards or after a few minutes (2 points)    [] MMRC 4: Cannot leave house; breathless on dressing/undressing (3 points)                         1/5/2024     9:53 PM   EPWORTH SLEEPINESS SCALE   Sitting and reading 0   Watching TV 1   Sitting, inactive in a public place (e.g. a theatre or a meeting) 0   As a passenger in a car for an hour without a break 1   Lying down to rest in the afternoon when circumstances permit 3   Sitting and talking to someone 0   Sitting quietly after a lunch without alcohol 0   In a car, while stopped for a few minutes in traffic 0   Total score 5                 Objective:     Vital Signs (Most Recent)  Vital Signs  Pulse: 102  Resp: 18  SpO2: 98 %  BP: 120/78  Pain Score: 0-No pain  Height and Weight  Height: 5' 4" (162.6 cm)  Weight: 81.9 kg (180 lb 8.9 oz)  BSA (Calculated - sq m): 1.92 sq meters  BMI (Calculated): 31  Weight in (lb) to have BMI = 25: 145.3]  Wt Readings from Last 2 Encounters:   01/11/24 81.9 kg (180 lb 8.9 oz)   01/11/24 81.9 kg (180 lb 8.9 oz)       Physical Exam  Vitals and nursing note reviewed.   Constitutional:       Appearance: She is normal weight.   HENT:      Head: Normocephalic and atraumatic.      Nose: Nose normal.   Eyes:      Pupils: Pupils are equal, round, and reactive to light.   Cardiovascular:      Rate and Rhythm: Normal rate and regular rhythm.      Pulses: Normal pulses.      Heart sounds: Normal " "heart sounds.   Pulmonary:      Effort: Pulmonary effort is normal.      Breath sounds: Normal breath sounds.   Abdominal:      General: Bowel sounds are normal.      Palpations: Abdomen is soft.   Musculoskeletal:      Cervical back: Normal range of motion.   Skin:     General: Skin is warm.   Neurological:      General: No focal deficit present.      Mental Status: She is alert and oriented to person, place, and time.   Psychiatric:         Mood and Affect: Mood normal.          Laboratory  Lab Results   Component Value Date    WBC 3.85 (L) 01/10/2024    RBC 4.30 01/10/2024    HGB 12.0 01/10/2024    HCT 37.8 01/10/2024    MCV 88 01/10/2024    MCH 27.9 01/10/2024    MCHC 31.7 (L) 01/10/2024    RDW 13.5 01/10/2024     01/10/2024    MPV 9.3 01/10/2024    GRAN 1.5 (L) 01/10/2024    GRAN 39.2 01/10/2024    LYMPH 1.6 01/10/2024    LYMPH 41.6 01/10/2024    MONO 0.3 01/10/2024    MONO 8.8 01/10/2024    EOS 0.4 01/10/2024    BASO 0.04 01/10/2024    EOSINOPHIL 9.4 (H) 01/10/2024    BASOPHIL 1.0 01/10/2024       BMP  Lab Results   Component Value Date     01/10/2024    K 3.9 01/10/2024     01/10/2024    CO2 23 01/10/2024    BUN 13 01/10/2024    CREATININE 0.7 01/10/2024    CALCIUM 8.9 01/10/2024    ANIONGAP 9 01/10/2024    ESTGFRAFRICA >60.0 06/03/2019    EGFRNONAA >60.0 06/03/2019    AST 25 01/10/2024    ALT 24 01/10/2024    PROT 7.0 01/10/2024          No results found for: "IGE"     No results found for: "ASPERGILLUS"  No results found for: "AFUMIGATUSCL"     No results found for: "ACE"     Diagnostic Results:  I have personally reviewed today the following studies:    X-Ray Chest 4 Or More Views  Narrative: EXAMINATION:  XR CHEST 4 OR MORE VIEW    CLINICAL HISTORY:  Obstructive sleep apnea (adult) (pediatric)    TECHNIQUE:  AP, lateral and bilateral oblique views of the chest    COMPARISON:  01/11/2024    FINDINGS:  Nodular density seen projecting over the right lower lung zone does not correspond to " the nipple shadow and therefore CT of the chest is recommended for further evaluation.  No infiltrates or effusions.  The cardiomediastinal silhouette is stable.  Impression: As above    This report was flagged in Epic as abnormal.    Electronically signed by: Ted Olmos DO  Date:    01/11/2024  Time:    10:20  X-Ray Chest PA And Lateral  Narrative: EXAMINATION:  XR CHEST PA AND LATERAL    CLINICAL HISTORY:  Sleep apnea, unspecified    TECHNIQUE:  PA and lateral views of the chest were performed.    COMPARISON:  None    FINDINGS:  The lungs are clear and free of infiltrate.  No pleural effusion or pneumothorax. The heart is not enlarged. Nodular density seen projecting over the right lower lung zone possibly representing the nipple shadow.  Repeat study with nipple markers with the patient in both the standard and shallow oblique projections is recommended.  Impression: As above    This report was flagged in Epic as abnormal.    Electronically signed by: Ted Olmos DO  Date:    01/11/2024  Time:    08:40     Start Study Time:  End Study Time:  Total Recording Time:  Total Sleep Time  Inconclusive REM Detection  9:55:36 PM  6:58:15 AM  9 hrs, 2 min  8 hrs, 16 min  Respiratory Indices  Total Events REM NREM All Night  pRDI:  pAHI 3%:  pAHIc 3%:  379  67  0.0  N/A  N/A  N/A  N/A  N/A  N/A  47.4  43.9  8.4  Oxygen Saturation Statistics  Mean: 95 Minimum: 88 Maximum: 100  Mean of Desaturations Nadirs (%): 93  Oxygen Desatur. %: 10-20 >20 Total  Events Number  Total  148 0 0 148  100.0 0.0 0.0 100.0  Oxygen Saturation: <90 <=88 <85 <80 <70  Duration (minutes):  Sleep %  0.2 0.1 0.0 0.0 0.0  0.0 0.0 0.0 0.0 0.0  Pulse Rate Statistics during Sleep (BPM)  Mean: 74 Minimum: 46 Maximum: 118  pRDI is calculated using oxi desaturation ? 3%  % :  351  CHAR 4%:  pAHI 4%: 239  N/A N/A 18.5  29.9  148  4-9  5 15 30  pAHI=43.9  Assessment/Plan:     Problem List Items Addressed This Visit       Hypothyroidism    Mixed  hyperlipidemia    SUE (obstructive sleep apnea) - Primary    Relevant Orders    Home Sleep Study     Other Visit Diagnoses       Abnormal chest x-ray        Relevant Orders    CT Chest With Contrast    Localized swelling, mass and lump, trunk        Relevant Orders    CT Chest With Contrast           Chest x-ray report was noted to be abnormal with possible nipple opacity.    Repeat x-ray with nipple marking the time is right lower lobe opacity   Chest CT scan with contrast ordered for tomorrow   I communicated this with patient over phone after she had left the office    CPAP will be ordered after HSAT     Follow up in about 4 weeks (around 2/8/2024), or HSAT, Virtual.    This note was prepared using voice recognition system and is likely to have sound alike errors that may have been overlooked even after proof reading.  Please call me with any questions    Discussed diagnosis, its evaluation, treatment and usual course. All questions answered.    Thank you for the courtesy of participating in the care of this patient    Izaiah Jacques MD      Personal Diagnostic Review  []  CXR    []  ECHO    []  ONSAT    []  6MWD    []  LABS    []  CHEST CT    []  PET CT    []  Biopsy results

## 2024-01-11 NOTE — PROGRESS NOTES
Updates were requested from care everywhere.  Health Maintenance has been updated.  [facility] immunization registry triggered.  Immunizations were reconciled.  Per ELLIOT in basket message, pt declined flu vaccine 01/11/2024.

## 2024-01-11 NOTE — PROGRESS NOTES
Results have been released via [employer]. Please verify that these have been viewed by patient. If not, please call patient with results.     I have sent a message to them with the following interpretation (see below).    I have reviewed your recent blood work.     CBC NORMAL-The CBC appears to be stable at this time with no sign of major anemia, abnormal white count or platelet abnormality.  CMP/BMP NORMAL-The electrolytes all appear stable at this time. This includes kidney functions along with routine electrolytes like sugar, potassium and sodium. The liver enzymes were noted to be stable also.  LIPID-ABNORMAL-Your cholesterol is slightly elevated, but has improved since it was last checked. Please continue to focus on lifestyle modification with low fat, low carb diet and exercise to improve these numbers and further decrease your risk of heart disease.  TSH NORMAL-ON MED-The TSH is normal. Continue current dose of thyroid medication.    Please do not hesitate to call or message with any additional questions or concerns.    Aleida Wallace PA-C

## 2024-01-12 ENCOUNTER — HOSPITAL ENCOUNTER (OUTPATIENT)
Dept: RADIOLOGY | Facility: HOSPITAL | Age: 39
Discharge: HOME | End: 2024-01-12
Attending: INTERNAL MEDICINE
Payer: COMMERCIAL

## 2024-01-12 ENCOUNTER — OFFICE VISIT (OUTPATIENT)
Dept: PULMONOLOGY | Facility: CLINIC | Age: 39
End: 2024-01-12
Payer: COMMERCIAL

## 2024-01-12 VITALS
HEIGHT: 64 IN | DIASTOLIC BLOOD PRESSURE: 80 MMHG | HEART RATE: 98 BPM | SYSTOLIC BLOOD PRESSURE: 122 MMHG | OXYGEN SATURATION: 98 % | BODY MASS INDEX: 30.6 KG/M2 | WEIGHT: 179.25 LBS | RESPIRATION RATE: 18 BRPM

## 2024-01-12 DIAGNOSIS — R93.89 ABNORMAL CXR: ICD-10-CM

## 2024-01-12 DIAGNOSIS — R91.1 PULMONARY NODULE: Primary | ICD-10-CM

## 2024-01-12 DIAGNOSIS — R22.2 LOCALIZED SWELLING, MASS AND LUMP, TRUNK: ICD-10-CM

## 2024-01-12 DIAGNOSIS — R93.89 ABNORMAL CHEST X-RAY: ICD-10-CM

## 2024-01-12 PROCEDURE — 3008F BODY MASS INDEX DOCD: CPT | Mod: CPTII,S$GLB,, | Performed by: INTERNAL MEDICINE

## 2024-01-12 PROCEDURE — 99214 OFFICE O/P EST MOD 30 MIN: CPT | Mod: S$GLB,,, | Performed by: INTERNAL MEDICINE

## 2024-01-12 PROCEDURE — 3074F SYST BP LT 130 MM HG: CPT | Mod: CPTII,S$GLB,, | Performed by: INTERNAL MEDICINE

## 2024-01-12 PROCEDURE — 1160F RVW MEDS BY RX/DR IN RCRD: CPT | Mod: CPTII,S$GLB,, | Performed by: INTERNAL MEDICINE

## 2024-01-12 PROCEDURE — 71260 CT THORAX DX C+: CPT | Mod: 26,,, | Performed by: RADIOLOGY

## 2024-01-12 PROCEDURE — 71260 CT THORAX DX C+: CPT | Mod: TC

## 2024-01-12 PROCEDURE — 1159F MED LIST DOCD IN RCRD: CPT | Mod: CPTII,S$GLB,, | Performed by: INTERNAL MEDICINE

## 2024-01-12 PROCEDURE — 25500020 PHARM REV CODE 255: Performed by: INTERNAL MEDICINE

## 2024-01-12 PROCEDURE — 3079F DIAST BP 80-89 MM HG: CPT | Mod: CPTII,S$GLB,, | Performed by: INTERNAL MEDICINE

## 2024-01-12 PROCEDURE — 99999 PR PBB SHADOW E&M-EST. PATIENT-LVL IV: CPT | Mod: PBBFAC,,, | Performed by: INTERNAL MEDICINE

## 2024-01-12 RX ADMIN — IOHEXOL 75 ML: 350 INJECTION, SOLUTION INTRAVENOUS at 08:01

## 2024-01-12 NOTE — PROGRESS NOTES
Pulmonary Outpatient  Visit     Subjective:       Patient ID: Monika Connell is a 38 y.o. female.    Social History     Tobacco Use   Smoking Status Former    Current packs/day: 0.00    Types: Cigarettes    Quit date: 2011    Years since quittin.2   Smokeless Tobacco Never            Chief Complaint: Results      Monika Connell is 38 y.o.  Referral  Concern about sleep issues  Seen Dentist watch [facility] 43.9/hr  Study begun 9:55 p.m. and ended at 6:58 a.m..  6 hours 2 minutes of recording time.  Inconclusive REM detected   Majority of the sleep was in supine position   No respiratory symptoms  History of nasal surgery Dr. Beck  Unfortunately this report does not have signature or identification of interpreting physician  I did inform patient that report needs to have interpretation physician signature  Repeat test in our sleep lab will be ordered   by profession  Snoring, and fatigue  Bed time 10 pm  Wake time 7 am    No RLS  No signs of narcolepsy  Grind teeth at night : No mouth guard  No naps  Nasal surgery  Dr Beck      2024  Followup  No Cough, No SOB  Recent URI x 3 last year  Seen ENT  Sinus infection  UC x 3  Was given LEVAQUIN              Review of Systems   All other systems reviewed and are negative.      Outpatient Encounter Medications as of 2024   Medication Sig Dispense Refill    [START ON 3/9/2024] dextroamphetamine-amphetamine (ADDERALL) 20 mg tablet Take 1 tablet by mouth 2 (two) times daily. 60 tablet 0    [START ON 2024] dextroamphetamine-amphetamine (ADDERALL) 20 mg tablet Take 1 tablet by mouth 2 (two) times daily. 60 tablet 0    dextroamphetamine-amphetamine (ADDERALL) 20 mg tablet Take 1 tablet by mouth 2 (two) times daily. 60 tablet 0    fluticasone propionate (FLONASE) 50 mcg/actuation nasal spray 1 spray (50 mcg total) by Each Nostril route once daily. 16 g 0    levoFLOXacin (LEVAQUIN) 500 MG tablet Take 1  tablet (500 mg total) by mouth once daily. for 21 days 21 tablet 0    multivit with min-folic acid 200 mcg Chew Take 1 tablet by mouth once daily.      progesterone (PROMETRIUM) 200 MG capsule Take 1 capsule (200 mg total) by mouth nightly. (Patient not taking: Reported on 2024) 12 capsule 11    sertraline (ZOLOFT) 25 MG tablet TAKE 1 TABLET(25 MG) BY MOUTH EVERY DAY 90 tablet 3    sertraline (ZOLOFT) 50 MG tablet TAKE 1 TABLET(50 MG) BY MOUTH EVERY DAY 90 tablet 1    SYNTHROID 137 mcg Tab tablet Take 1 tablet (137 mcg total) by mouth before breakfast. 30 tablet 11     Facility-Administered Encounter Medications as of 2024   Medication Dose Route Frequency Provider Last Rate Last Admin    [COMPLETED] iohexoL (OMNIPAQUE 350) injection 75 mL  75 mL Intravenous ONCE PRN Izaiah Jacques MD   75 mL at 24 0809       The following portions of the patient's history were reviewed and updated as appropriate: She  has a past medical history of ADHD (attention deficit hyperactivity disorder), Allergy, Anxiety, Depression, and Thyroid disease.  She does not have any pertinent problems on file.  She  has a past surgical history that includes  section;  section (N/A, 2021); Eye surgery (one at 5 yo and one at 11 yo); and Strabismus surgery.  Her family history includes Breast cancer in her paternal grandmother; Macular degeneration in her maternal grandmother and paternal grandfather.  She  reports that she quit smoking about 12 years ago. Her smoking use included cigarettes. She has never used smokeless tobacco. She reports that she does not drink alcohol and does not use drugs.  She has a current medication list which includes the following prescription(s): [START ON 3/9/2024] dextroamphetamine-amphetamine, [START ON 2024] dextroamphetamine-amphetamine, dextroamphetamine-amphetamine, fluticasone propionate, levofloxacin, multivit with min-folic acid, progesterone, sertraline,  "sertraline, and synthroid.  Current Outpatient Medications on File Prior to Visit   Medication Sig Dispense Refill    [START ON 3/9/2024] dextroamphetamine-amphetamine (ADDERALL) 20 mg tablet Take 1 tablet by mouth 2 (two) times daily. 60 tablet 0    [START ON 2/8/2024] dextroamphetamine-amphetamine (ADDERALL) 20 mg tablet Take 1 tablet by mouth 2 (two) times daily. 60 tablet 0    dextroamphetamine-amphetamine (ADDERALL) 20 mg tablet Take 1 tablet by mouth 2 (two) times daily. 60 tablet 0    fluticasone propionate (FLONASE) 50 mcg/actuation nasal spray 1 spray (50 mcg total) by Each Nostril route once daily. 16 g 0    levoFLOXacin (LEVAQUIN) 500 MG tablet Take 1 tablet (500 mg total) by mouth once daily. for 21 days 21 tablet 0    multivit with min-folic acid 200 mcg Chew Take 1 tablet by mouth once daily.      progesterone (PROMETRIUM) 200 MG capsule Take 1 capsule (200 mg total) by mouth nightly. (Patient not taking: Reported on 1/11/2024) 12 capsule 11    sertraline (ZOLOFT) 25 MG tablet TAKE 1 TABLET(25 MG) BY MOUTH EVERY DAY 90 tablet 3    sertraline (ZOLOFT) 50 MG tablet TAKE 1 TABLET(50 MG) BY MOUTH EVERY DAY 90 tablet 1    SYNTHROID 137 mcg Tab tablet Take 1 tablet (137 mcg total) by mouth before breakfast. 30 tablet 11     Current Facility-Administered Medications on File Prior to Visit   Medication Dose Route Frequency Provider Last Rate Last Admin    [COMPLETED] iohexoL (OMNIPAQUE 350) injection 75 mL  75 mL Intravenous ONCE PRN Izaiah Jacques MD   75 mL at 01/12/24 0809     She is allergic to pcn [penicillins] and clindamycin..      BP Readings from Last 3 Encounters:   01/12/24 122/80   01/11/24 120/78   01/09/24 116/82        Neck circumference 14"      MMRC Dyspnea Scale (4 is worst)     [x] MMRC 0: Dyspneic on strenuous excercise (0 points)    [] MMRC 1: Dyspneic on walking a slight hill (0 points)    [] MMRC 2: Dyspneic on walking level ground; must stop occasionally due to breathlessness (1 " "point)    [] MMRC 3: Must stop for breathlessness after walking 100 yards or after a few minutes (2 points)    [] MMRC 4: Cannot leave house; breathless on dressing/undressing (3 points)                         1/5/2024     9:53 PM   EPWORTH SLEEPINESS SCALE   Sitting and reading 0   Watching TV 1   Sitting, inactive in a public place (e.g. a theatre or a meeting) 0   As a passenger in a car for an hour without a break 1   Lying down to rest in the afternoon when circumstances permit 3   Sitting and talking to someone 0   Sitting quietly after a lunch without alcohol 0   In a car, while stopped for a few minutes in traffic 0   Total score 5                 Objective:     Vital Signs (Most Recent)  Vital Signs  Pulse: 98  Resp: 18  SpO2: 98 %  BP: 122/80  Height and Weight  Height: 5' 4" (162.6 cm)  Weight: 81.3 kg (179 lb 3.7 oz)  BSA (Calculated - sq m): 1.92 sq meters  BMI (Calculated): 30.8  Weight in (lb) to have BMI = 25: 145.3]  Wt Readings from Last 2 Encounters:   01/12/24 81.3 kg (179 lb 3.7 oz)   01/11/24 81.9 kg (180 lb 8.9 oz)       Physical Exam  Vitals and nursing note reviewed.   Constitutional:       Appearance: She is normal weight.   HENT:      Head: Normocephalic and atraumatic.      Nose: Nose normal.   Eyes:      Pupils: Pupils are equal, round, and reactive to light.   Cardiovascular:      Rate and Rhythm: Normal rate and regular rhythm.      Pulses: Normal pulses.      Heart sounds: Normal heart sounds.   Pulmonary:      Effort: Pulmonary effort is normal.      Breath sounds: Normal breath sounds.   Abdominal:      General: Bowel sounds are normal.      Palpations: Abdomen is soft.   Musculoskeletal:      Cervical back: Normal range of motion.   Skin:     General: Skin is warm.   Neurological:      General: No focal deficit present.      Mental Status: She is alert and oriented to person, place, and time.   Psychiatric:         Mood and Affect: Mood normal.          Laboratory  Lab Results " "  Component Value Date    WBC 3.85 (L) 01/10/2024    RBC 4.30 01/10/2024    HGB 12.0 01/10/2024    HCT 37.8 01/10/2024    MCV 88 01/10/2024    MCH 27.9 01/10/2024    MCHC 31.7 (L) 01/10/2024    RDW 13.5 01/10/2024     01/10/2024    MPV 9.3 01/10/2024    GRAN 1.5 (L) 01/10/2024    GRAN 39.2 01/10/2024    LYMPH 1.6 01/10/2024    LYMPH 41.6 01/10/2024    MONO 0.3 01/10/2024    MONO 8.8 01/10/2024    EOS 0.4 01/10/2024    BASO 0.04 01/10/2024    EOSINOPHIL 9.4 (H) 01/10/2024    BASOPHIL 1.0 01/10/2024       BMP  Lab Results   Component Value Date     01/10/2024    K 3.9 01/10/2024     01/10/2024    CO2 23 01/10/2024    BUN 13 01/10/2024    CREATININE 0.7 01/10/2024    CALCIUM 8.9 01/10/2024    ANIONGAP 9 01/10/2024    ESTGFRAFRICA >60.0 06/03/2019    EGFRNONAA >60.0 06/03/2019    AST 25 01/10/2024    ALT 24 01/10/2024    PROT 7.0 01/10/2024          No results found for: "IGE"     No results found for: "ASPERGILLUS"  No results found for: "AFUMIGATUSCL"     No results found for: "ACE"     Diagnostic Results:  I have personally reviewed today the following studies:    CT Chest With Contrast  Narrative: EXAMINATION:  CT CHEST WITH CONTRAST    CLINICAL HISTORY:  Abnormal x-ray    TECHNIQUE:  Axial CT images were obtained through the chest after IV administration of 75 cc Omnipaque 350 contrast.  Coronal and sagittal reconstructions submitted and interpreted.  Total .  Automated exposure control utilized.    COMPARISON:  Radiographs 01/11/2024    FINDINGS:  No focal consolidation, pneumothorax or pleural effusion.  No suspicious lung nodule or mass.    Heart is normal size.  Thoracic aorta is normal in caliber.  Central airways are clear.    No enlarged lymph nodes.    No acute or suspicious osseous findings.    Visualized portions of the upper abdomen show no acute findings.  Impression: 1. No acute findings in the chest.  2. No nodular abnormality is identified in the right hemithorax to " correspond to the findings on x-ray.  Findings likely reflect pulmonary vasculature.    Electronically signed by: Selvin Raphael MD  Date:    01/12/2024  Time:    09:33     Start Study Time:  End Study Time:  Total Recording Time:  Total Sleep Time  Inconclusive REM Detection  9:55:36 PM  6:58:15 AM  9 hrs, 2 min  8 hrs, 16 min  Respiratory Indices  Total Events REM NREM All Night  pRDI:  pAHI 3%:  pAHIc 3%:  379  67  0.0  N/A  N/A  N/A  N/A  N/A  N/A  47.4  43.9  8.4  Oxygen Saturation Statistics  Mean: 95 Minimum: 88 Maximum: 100  Mean of Desaturations Nadirs (%): 93  Oxygen Desatur. %: 10-20 >20 Total  Events Number  Total  148 0 0 148  100.0 0.0 0.0 100.0  Oxygen Saturation: <90 <=88 <85 <80 <70  Duration (minutes):  Sleep %  0.2 0.1 0.0 0.0 0.0  0.0 0.0 0.0 0.0 0.0  Pulse Rate Statistics during Sleep (BPM)  Mean: 74 Minimum: 46 Maximum: 118  pRDI is calculated using oxi desaturation ? 3%  % :  351  CHAR 4%:  pAHI 4%: 239  N/A N/A 18.5  29.9  148  4-9  5 15 30  pAHI=43.9  Assessment/Plan:     Problem List Items Addressed This Visit       Pulmonary nodule - Primary     Distant former smoker  RML sub centimeter  Low risk         Abnormal CXR     Chest x-ray reviewed   No corresponding abnormality seen on chest CT scan   Reassurance             Chest x-ray report was noted to be abnormal with possible nipple opacity.    Repeat x-ray with nipple marking the time is right lower lobe opacity   Chest CT scan with contrast ordered for tomorrow   I communicated this with patient over phone after she had left the office    CPAP will be ordered after HSAT     Follow up if symptoms worsen or fail to improve.    This note was prepared using voice recognition system and is likely to have sound alike errors that may have been overlooked even after proof reading.  Please call me with any questions    Discussed diagnosis, its evaluation, treatment and usual course. All questions answered.    Thank you for the courtesy of  participating in the care of this patient    Izaiah Jacques MD      Personal Diagnostic Review  []  CXR    []  ECHO    []  ONSAT    []  6MWD    []  LABS    []  CHEST CT    []  PET CT    []  Biopsy results

## 2024-01-16 ENCOUNTER — PATIENT MESSAGE (OUTPATIENT)
Dept: OTOLARYNGOLOGY | Facility: CLINIC | Age: 39
End: 2024-01-16
Payer: COMMERCIAL

## 2024-01-16 LAB
FINAL PATHOLOGIC DIAGNOSIS: NORMAL
Lab: NORMAL

## 2024-01-16 NOTE — TELEPHONE ENCOUNTER
It is a rare side effect of the medication. If her trouble sleeping is severe or really bothersome, we could consider switching the antibiotic.

## 2024-01-22 ENCOUNTER — PATIENT MESSAGE (OUTPATIENT)
Dept: ENDOCRINOLOGY | Facility: CLINIC | Age: 39
End: 2024-01-22
Payer: COMMERCIAL

## 2024-01-23 ENCOUNTER — PATIENT MESSAGE (OUTPATIENT)
Dept: ENDOCRINOLOGY | Facility: CLINIC | Age: 39
End: 2024-01-23
Payer: COMMERCIAL

## 2024-01-23 RX ORDER — LEVOTHYROXINE SODIUM 137 UG/1
137 TABLET ORAL
Qty: 30 TABLET | Refills: 0 | Status: SHIPPED | OUTPATIENT
Start: 2024-01-23 | End: 2024-01-29 | Stop reason: SDUPTHER

## 2024-01-24 ENCOUNTER — PATIENT MESSAGE (OUTPATIENT)
Dept: PULMONOLOGY | Facility: CLINIC | Age: 39
End: 2024-01-24
Payer: COMMERCIAL

## 2024-01-24 DIAGNOSIS — G47.33 OSA (OBSTRUCTIVE SLEEP APNEA): Primary | ICD-10-CM

## 2024-01-24 NOTE — PROGRESS NOTES
Orders Placed This Encounter   Procedures    CPAP FOR HOME USE     Order Specific Question:   Length of need (1-99 months):     Answer:   99     Order Specific Question:   Fulfillment Priority:     Answer:   Level 2:   AHI 30  to < 59 associated with systolic congestive heart failure, moderate-severe COPD, moderate-severe neuromuscular disease, HTN consistently > 140/90 despite at least 2 anti-hypertensives that patient is compliant with, atrial fibrillation     Order Specific Question:   Type ():     Answer:   Auto CPAP     Order Specific Question:   Auto CPAP pressure setting range (cmH20):     Answer:   5-20     Order Specific Question:   Humidification ():     Answer:   Heated     Order Specific Question:   Choose ONE mask type and its corresponding cushions and/or pillows:     Answer:    Nasal Mask, 1 per 90 days:  Nasal Cushions, (6 per 90 days):  Nasal Pillows, (6 per 90 days)     Order Specific Question:   Choose EITHER Heated or Non-Heated Tubjing     Answer:    Non-Heated Tubing, 1 per 90 days     Order Specific Question:   All other supplies as needed as listed below:     Answer:    Headgear, 1 per 180 days     Order Specific Question:   All other supplies as needed as listed below:     Answer:    Chin Strap, 1 per 180 days     Order Specific Question:   All other supplies as needed as listed below:     Answer:    Non-Disposable Filter, 1 per 180 days     Order Specific Question:   All other supplies as needed as listed below:     Answer:    Disposable Filter, 6 per 90 days     Order Specific Question:   All other supplies as needed as listed below:     Answer:    Exhalation Port, contact payer for quantity/frequency     Order Specific Question:   All other supplies as needed as listed below:     Answer:    Humidifier Chamber, 1 per 180 days

## 2024-01-29 ENCOUNTER — OFFICE VISIT (OUTPATIENT)
Dept: ENDOCRINOLOGY | Facility: CLINIC | Age: 39
End: 2024-01-29
Payer: COMMERCIAL

## 2024-01-29 DIAGNOSIS — E03.9 HYPOTHYROIDISM, UNSPECIFIED TYPE: Primary | ICD-10-CM

## 2024-01-29 PROCEDURE — 1160F RVW MEDS BY RX/DR IN RCRD: CPT | Mod: CPTII,95,, | Performed by: INTERNAL MEDICINE

## 2024-01-29 PROCEDURE — 99213 OFFICE O/P EST LOW 20 MIN: CPT | Mod: 95,,, | Performed by: INTERNAL MEDICINE

## 2024-01-29 PROCEDURE — 1159F MED LIST DOCD IN RCRD: CPT | Mod: CPTII,95,, | Performed by: INTERNAL MEDICINE

## 2024-01-29 RX ORDER — LEVOTHYROXINE SODIUM 137 UG/1
137 TABLET ORAL
Qty: 30 TABLET | Refills: 11 | Status: SHIPPED | OUTPATIENT
Start: 2024-01-29 | End: 2025-01-28

## 2024-01-29 NOTE — PROGRESS NOTES
The patient location is: [place]    Visit type: audiovisual    Face to Face time with patient: 7  10 minutes of total time spent on the encounter, which includes face to face time and non-face to face time preparing to see the patient (eg, review of tests), Obtaining and/or reviewing separately obtained history, Documenting clinical information in the electronic or other health record, Independently interpreting results (not separately reported) and communicating results to the patient/family/caregiver, or Care coordination (not separately reported).         Each patient to whom he or she provides medical services by telemedicine is:  (1) informed of the relationship between the physician and patient and the respective role of any other health care provider with respect to management of the patient; and (2) notified that he or she may decline to receive medical services by telemedicine and may withdraw from such care at any time.    Notes:        CHIEF COMPLAINT: Hypothyroidism  38 y.o.  female here for followup. Currently on Synthroid 137 mcg. On brandname.  No palpitations. No tremors. NO Diarrhea or constipation. Cycle chronically irregular. Has some fatigue. She has had a sleep study and has SUE. Getting CPAP.               PAST MEDICAL HISTORY: Hypothyroid. PCOS.     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         PE: virtual visit         Latest Reference Range & Units 01/10/24 07:20   TSH 0.400 - 4.000 uIU/mL 1.242           A/P  1. Hypothyroidism- TPO+.  On brand-name Synthroid.  Symptomatically doing well.  TSH within normal limits.  Continue current dose of thyroid medication.    2. PCOS- being followed by OB.     FOLLOWUP  F/U 1 yr with TSH

## 2024-01-30 RX ORDER — CLARITHROMYCIN 500 MG/1
500 TABLET, FILM COATED ORAL DAILY
Qty: 14 TABLET | Refills: 0 | Status: SHIPPED | OUTPATIENT
Start: 2024-01-30 | End: 2024-02-13

## 2024-01-30 NOTE — TELEPHONE ENCOUNTER
I sent an alternative for her that will hopefully be better tolerated. She can keep her current scan and f/u.

## 2024-02-01 ENCOUNTER — TELEPHONE (OUTPATIENT)
Dept: PULMONOLOGY | Facility: CLINIC | Age: 39
End: 2024-02-01
Payer: COMMERCIAL

## 2024-02-01 NOTE — TELEPHONE ENCOUNTER
----- Message from Rosa Melvin sent at 2/1/2024  2:28 PM CST -----  Regarding: PAP Follow Up  Patient received pap equipment today so virtual appointment with Dr Jacques for 2/8/24 is too soon. It needs to be between 3/1/24 and 5/1/24. Please call her to reschedule.     Thank you,  Rosa Melvin, CRT, SDS

## 2024-02-11 ENCOUNTER — PATIENT MESSAGE (OUTPATIENT)
Dept: PULMONOLOGY | Facility: CLINIC | Age: 39
End: 2024-02-11
Payer: COMMERCIAL

## 2024-04-19 DIAGNOSIS — F41.8 DEPRESSION WITH ANXIETY: ICD-10-CM

## 2024-04-19 RX ORDER — SERTRALINE HYDROCHLORIDE 50 MG/1
50 TABLET, FILM COATED ORAL
Qty: 90 TABLET | Refills: 1 | Status: SHIPPED | OUTPATIENT
Start: 2024-04-19 | End: 2024-05-13 | Stop reason: SDUPTHER

## 2024-04-19 NOTE — TELEPHONE ENCOUNTER
No care due was identified.  [facility] Embedded Care Due Messages. Reference number: [id number].   4/19/2024 7:58:57 AM CDT

## 2024-04-20 NOTE — TELEPHONE ENCOUNTER
Refill Decision Note   Monika Connell  is requesting a refill authorization.  Brief Assessment and Rationale for Refill:  Approve     Medication Therapy Plan:        Comments:     Note composed:9:19 PM 04/19/2024

## 2024-05-13 ENCOUNTER — OFFICE VISIT (OUTPATIENT)
Dept: FAMILY MEDICINE | Facility: CLINIC | Age: 39
End: 2024-05-13
Payer: COMMERCIAL

## 2024-05-13 DIAGNOSIS — F98.8 ATTENTION DEFICIT DISORDER, UNSPECIFIED HYPERACTIVITY PRESENCE: Primary | ICD-10-CM

## 2024-05-13 DIAGNOSIS — F41.8 DEPRESSION WITH ANXIETY: ICD-10-CM

## 2024-05-13 DIAGNOSIS — L98.9 SKIN LESION OF FACE: ICD-10-CM

## 2024-05-13 PROCEDURE — 1159F MED LIST DOCD IN RCRD: CPT | Mod: CPTII,95,, | Performed by: PHYSICIAN ASSISTANT

## 2024-05-13 PROCEDURE — 1160F RVW MEDS BY RX/DR IN RCRD: CPT | Mod: CPTII,95,, | Performed by: PHYSICIAN ASSISTANT

## 2024-05-13 PROCEDURE — 99214 OFFICE O/P EST MOD 30 MIN: CPT | Mod: 95,,, | Performed by: PHYSICIAN ASSISTANT

## 2024-05-13 RX ORDER — DEXTROAMPHETAMINE SACCHARATE, AMPHETAMINE ASPARTATE, DEXTROAMPHETAMINE SULFATE AND AMPHETAMINE SULFATE 5; 5; 5; 5 MG/1; MG/1; MG/1; MG/1
1 TABLET ORAL 2 TIMES DAILY
Qty: 60 TABLET | Refills: 0 | Status: SHIPPED | OUTPATIENT
Start: 2024-05-13 | End: 2024-06-12

## 2024-05-13 RX ORDER — SERTRALINE HYDROCHLORIDE 50 MG/1
50 TABLET, FILM COATED ORAL DAILY
Qty: 90 TABLET | Refills: 3 | Status: SHIPPED | OUTPATIENT
Start: 2024-05-13

## 2024-05-13 RX ORDER — DEXTROAMPHETAMINE SACCHARATE, AMPHETAMINE ASPARTATE, DEXTROAMPHETAMINE SULFATE AND AMPHETAMINE SULFATE 5; 5; 5; 5 MG/1; MG/1; MG/1; MG/1
1 TABLET ORAL 2 TIMES DAILY
Qty: 60 TABLET | Refills: 0 | Status: SHIPPED | OUTPATIENT
Start: 2024-06-12 | End: 2024-07-12

## 2024-05-13 RX ORDER — DOXYCYCLINE 100 MG/1
100 CAPSULE ORAL 2 TIMES DAILY
Qty: 20 CAPSULE | Refills: 0 | Status: SHIPPED | OUTPATIENT
Start: 2024-05-13 | End: 2024-05-23

## 2024-05-13 RX ORDER — DEXTROAMPHETAMINE SACCHARATE, AMPHETAMINE ASPARTATE, DEXTROAMPHETAMINE SULFATE AND AMPHETAMINE SULFATE 5; 5; 5; 5 MG/1; MG/1; MG/1; MG/1
1 TABLET ORAL 2 TIMES DAILY
Qty: 60 TABLET | Refills: 0 | Status: SHIPPED | OUTPATIENT
Start: 2024-07-12 | End: 2024-08-11

## 2024-05-13 RX ORDER — SERTRALINE HYDROCHLORIDE 25 MG/1
25 TABLET, FILM COATED ORAL DAILY
Qty: 90 TABLET | Refills: 3 | Status: SHIPPED | OUTPATIENT
Start: 2024-05-13

## 2024-05-13 NOTE — PROGRESS NOTES
Primary Care Telemedicine Note  The patient location is: Patient Home   The chief complaint leading to consultation is: Attention Deficit Disorder  Total time spent with patient: 15 minutes    Visit type: Virtual visit with synchronous audio only and video  Each patient to whom he or she provides medical services by telemedicine is: (1) informed of the relationship between the physician and patient and the respective role of any other health care provider with respect to management of the patient; and (2) notified that he or she may decline to receive medical services by telemedicine and may withdraw from such care at any time.      Assessment/Plan:    Problem List Items Addressed This Visit          Psychiatric    Attention deficit disorder - Primary    Overview     -chronic; stable on current medication  -currently on adderall 20 mg BID  -denies AE of medications  -The patient was checked in the [facility] Board of Pharmacy's Prescription Monitoring Program. There appears to be no incongruities with the patient's verbalized history.          Relevant Medications    dextroamphetamine-amphetamine (ADDERALL) 20 mg tablet    dextroamphetamine-amphetamine (ADDERALL) 20 mg tablet (Start on 6/12/2024)    dextroamphetamine-amphetamine (ADDERALL) 20 mg tablet (Start on 7/12/2024)    Other Relevant Orders    Ambulatory referral/consult to Psychiatry    Depression with anxiety    Overview     -previously followed by Dr. Lopez with psychiatry but no longer seeing psychiatry  -requesting referral to [facility]   -symptoms controlled with zoloft 75 mg QD  -denies AE of medications  -plan to continue         Relevant Medications    sertraline (ZOLOFT) 25 MG tablet    sertraline (ZOLOFT) 50 MG tablet    Other Relevant Orders    Ambulatory referral/consult to Psychiatry     Other Visit Diagnoses       Skin lesion of face        Relevant Medications    doxycycline (VIBRAMYCIN) 100 MG Cap            Follow up in about  3 months (around 2024), or if symptoms worsen or fail to improve, for Virtual Visit.    Aleida Wallace PA-C  _____________________________________________________________________________________________________________________________________________________    CC: Attention Deficit Disorder    HPI: The patient is being seen via telehealth today to discuss the chronic use of attention deficit medications. The patient is enrolled in the Telemedicine ADHD Program and receives 3/4 refills via Telehealth with 1/4 being in person. This visit is 1/3 of the telemedicine visits in that rotation. The patient has been on current medicine for the last several years and has been stable on the current dose during that time. The patient has been compliant on the medicine and is not receiving narcotics from any other physician. The  for the State of [place] has been viewed. The patient denies any complications from taking the medicine. The patient's weight and appetite have been stable and has not had difficulties with agitation. The patient reports improvement in the symptoms with the current dose.     Patient also reports multiple lesions present on her face. She has a hx of skin picking. She has been using topical antibiotics (Mupirocin) with minimal improvement in symptoms. She denies fever, pain or drainage. She is concerned for staph infection.    Remaining chronic conditions have been reviewed and remain stable. Further detail as stated above.     No other complaints today.    Past Medical History:   Diagnosis Date    ADHD (attention deficit hyperactivity disorder)     Allergy     Anxiety     Depression     Thyroid disease      Past Surgical History:   Procedure Laterality Date     SECTION       SECTION N/A 2021    Procedure:  SECTION;  Surgeon: Sai Harris MD;  [facility];  Service: OB/GYN;  Laterality: N/A;    EYE SURGERY  one at 5 yo and one at 11 yo    STRABISMUS SURGERY        Review of patient's allergies indicates:   Allergen Reactions    Pcn [penicillins] Hives    Clindamycin Itching and Rash     Social History     Tobacco Use    Smoking status: Former     Current packs/day: 0.00     Types: Cigarettes     Quit date: 2011     Years since quittin.5    Smokeless tobacco: Never   Substance Use Topics    Alcohol use: No    Drug use: Never     Family History   Problem Relation Name Age of Onset    Macular degeneration Maternal Grandmother      Breast cancer Paternal Grandmother      Macular degeneration Paternal Grandfather      Ovarian cancer Neg Hx      Glaucoma Neg Hx       Current Outpatient Medications on File Prior to Visit   Medication Sig Dispense Refill    fluticasone propionate (FLONASE) 50 mcg/actuation nasal spray 1 spray (50 mcg total) by Each Nostril route once daily. 16 g 0    multivit with min-folic acid 200 mcg Chew Take 1 tablet by mouth once daily.      progesterone (PROMETRIUM) 200 MG capsule Take 1 capsule (200 mg total) by mouth nightly. (Patient not taking: Reported on 2024) 12 capsule 11    SYNTHROID 137 mcg Tab tablet Take 1 tablet (137 mcg total) by mouth before breakfast. Brandname Synthroid 30 tablet 11    [DISCONTINUED] dextroamphetamine-amphetamine (ADDERALL) 20 mg tablet Take 1 tablet by mouth 2 (two) times daily. 60 tablet 0    [DISCONTINUED] sertraline (ZOLOFT) 25 MG tablet TAKE 1 TABLET(25 MG) BY MOUTH EVERY DAY 90 tablet 3    [DISCONTINUED] sertraline (ZOLOFT) 50 MG tablet TAKE 1 TABLET(50 MG) BY MOUTH EVERY DAY 90 tablet 1     No current facility-administered medications on file prior to visit.       Review of Systems   Constitutional:  Negative for chills, fever and malaise/fatigue.   HENT:  Negative for hearing loss.    Eyes:  Negative for discharge.   Respiratory:  Negative for cough, shortness of breath and wheezing.    Cardiovascular:  Negative for chest pain, palpitations and leg swelling.   Gastrointestinal:  Negative for  abdominal pain, blood in stool, constipation, diarrhea and vomiting.   Genitourinary:  Negative for dysuria, frequency and hematuria.   Musculoskeletal:  Negative for back pain, joint pain and neck pain.   Neurological:  Negative for weakness and headaches.   Endo/Heme/Allergies:  Negative for polydipsia.   Psychiatric/Behavioral:  Negative for depression. The patient is not nervous/anxious.            Physical Exam  Constitutional:       General: She is not in acute distress.     Appearance: She is well-developed.   HENT:      Head: Normocephalic and atraumatic.   Pulmonary:      Effort: Pulmonary effort is normal. No respiratory distress.   Abdominal:      General: There is no distension.   Musculoskeletal:         General: Normal range of motion.      Cervical back: Normal range of motion.   Neurological:      Mental Status: She is alert and oriented to person, place, and time.   Psychiatric:         Mood and Affect: Mood normal.         Behavior: Behavior normal.         The patient's Health Maintenance was reviewed and the following appears to be due at this time:  Health Maintenance Due   Topic Date Due    Pneumococcal Vaccines (Age 0-64) (1 of 2 - PCV) Never done    COVID-19 Vaccine (4 - 2023-24 season) 09/01/2023

## 2024-05-14 ENCOUNTER — PATIENT MESSAGE (OUTPATIENT)
Dept: PSYCHIATRY | Facility: CLINIC | Age: 39
End: 2024-05-14
Payer: COMMERCIAL

## 2024-06-22 ENCOUNTER — PATIENT MESSAGE (OUTPATIENT)
Dept: PULMONOLOGY | Facility: CLINIC | Age: 39
End: 2024-06-22
Payer: COMMERCIAL

## 2024-06-24 ENCOUNTER — TELEPHONE (OUTPATIENT)
Dept: PULMONOLOGY | Facility: CLINIC | Age: 39
End: 2024-06-24
Payer: COMMERCIAL

## 2024-06-24 NOTE — TELEPHONE ENCOUNTER
Called pt regarding follow up appt and DME question. Appt scheduled, pt informed to call insurance or DME company for DME question about statement, pt verbalized understanding

## 2024-06-25 ENCOUNTER — OFFICE VISIT (OUTPATIENT)
Dept: PULMONOLOGY | Facility: CLINIC | Age: 39
End: 2024-06-25
Payer: COMMERCIAL

## 2024-06-25 VITALS — WEIGHT: 180 LBS | BODY MASS INDEX: 33.13 KG/M2 | HEIGHT: 62 IN

## 2024-06-25 DIAGNOSIS — G47.33 OSA (OBSTRUCTIVE SLEEP APNEA): ICD-10-CM

## 2024-06-25 DIAGNOSIS — R91.1 PULMONARY NODULE: Primary | ICD-10-CM

## 2024-06-25 PROCEDURE — 3008F BODY MASS INDEX DOCD: CPT | Mod: CPTII,95,, | Performed by: INTERNAL MEDICINE

## 2024-06-25 PROCEDURE — 1160F RVW MEDS BY RX/DR IN RCRD: CPT | Mod: CPTII,95,, | Performed by: INTERNAL MEDICINE

## 2024-06-25 PROCEDURE — 99213 OFFICE O/P EST LOW 20 MIN: CPT | Mod: 95,,, | Performed by: INTERNAL MEDICINE

## 2024-06-25 PROCEDURE — 1159F MED LIST DOCD IN RCRD: CPT | Mod: CPTII,95,, | Performed by: INTERNAL MEDICINE

## 2024-06-25 NOTE — PROGRESS NOTES
The patient location is: [facility]  The chief complaint leading to consultation is:   Chief Complaint   Patient presents with    Sleep Apnea        Visit type: audiovisual    Face to Face time with patient: 4 min  30 minutes of total time spent on the encounter, which includes face to face time and non-face to face time preparing to see the patient (eg, review of tests), Obtaining and/or reviewing separately obtained history, Documenting clinical information in the electronic or other health record, Independently interpreting results (not separately reported) and communicating results to the patient/family/caregiver, or Care coordination (not separately reported).         Each patient to whom he or she provides medical services by telemedicine is:  (1) informed of the relationship between the physician and patient and the respective role of any other health care provider with respect to management of the patient; and (2) notified that he or she may decline to receive medical services by telemedicine and may withdraw from such care at any time.    Notes:                                           Pulmonary Outpatient  Visit     Subjective:       Patient ID: Monika Connell is a 38 y.o. female.    Social History     Tobacco Use   Smoking Status Former    Current packs/day: 0.00    Types: Cigarettes    Quit date: 2011    Years since quittin.6   Smokeless Tobacco Never            Chief Complaint: Sleep Apnea      Monika Connell is 38 y.o.  Referral  Concern about sleep issues  Seen Dentist watch [facility] 43.9/hr  Study begun 9:55 p.m. and ended at 6:58 a.m..  6 hours 2 minutes of recording time.  Inconclusive REM detected   Majority of the sleep was in supine position   No respiratory symptoms  History of nasal surgery Dr. Beck  Unfortunately this report does not have signature or identification of interpreting physician  I did inform patient that report needs to have interpretation physician  signature  Repeat test in our sleep lab will be ordered   by profession  Snoring, and fatigue  Bed time 10 pm  Wake time 7 am    No RLS  No signs of narcolepsy  Grind teeth at night : No mouth guard  No naps  Nasal surgery 2009 Dr Beck      01/12/2024  Followup  No Cough, No SOB  Recent URI x 3 last year  Seen ENT  Sinus infection  UC x 3  Was given LEVAQUIN    06/25/2024  Followup  Using CPAP  Says Amazing  Bed time: 9 pm, SOL 11-2 hrs  Wake 6-7 am  USING AND BENEFITS              Review of Systems   All other systems reviewed and are negative.      Outpatient Encounter Medications as of 6/25/2024   Medication Sig Dispense Refill    dextroamphetamine-amphetamine (ADDERALL) 20 mg tablet Take 1 tablet by mouth 2 (two) times daily. 60 tablet 0    [START ON 7/12/2024] dextroamphetamine-amphetamine (ADDERALL) 20 mg tablet Take 1 tablet by mouth 2 (two) times daily. 60 tablet 0    multivit with min-folic acid 200 mcg Chew Take 1 tablet by mouth once daily.      sertraline (ZOLOFT) 25 MG tablet Take 1 tablet (25 mg total) by mouth once daily. 90 tablet 3    sertraline (ZOLOFT) 50 MG tablet Take 1 tablet (50 mg total) by mouth once daily. 90 tablet 3    SYNTHROID 137 mcg Tab tablet Take 1 tablet (137 mcg total) by mouth before breakfast. Brandname Synthroid 30 tablet 11    progesterone (PROMETRIUM) 200 MG capsule Take 1 capsule (200 mg total) by mouth nightly. 12 capsule 11    [DISCONTINUED] fluticasone propionate (FLONASE) 50 mcg/actuation nasal spray 1 spray (50 mcg total) by Each Nostril route once daily. 16 g 0     No facility-administered encounter medications on file as of 6/25/2024.       The following portions of the patient's history were reviewed and updated as appropriate: She  has a past medical history of ADHD (attention deficit hyperactivity disorder), Allergy, Anxiety, Depression, and Thyroid disease.  She does not have any pertinent problems on file.  She  has a past surgical history that includes   section;  section (N/A, 2021); Eye surgery (one at 5 yo and one at 11 yo); and Strabismus surgery.  Her family history includes Breast cancer in her paternal grandmother; Macular degeneration in her maternal grandmother and paternal grandfather.  She  reports that she quit smoking about 12 years ago. Her smoking use included cigarettes. She has never used smokeless tobacco. She reports that she does not drink alcohol and does not use drugs.  She has a current medication list which includes the following prescription(s): dextroamphetamine-amphetamine, [START ON 2024] dextroamphetamine-amphetamine, multivit with min-folic acid, sertraline, sertraline, synthroid, and progesterone.  Current Outpatient Medications on File Prior to Visit   Medication Sig Dispense Refill    dextroamphetamine-amphetamine (ADDERALL) 20 mg tablet Take 1 tablet by mouth 2 (two) times daily. 60 tablet 0    [START ON 2024] dextroamphetamine-amphetamine (ADDERALL) 20 mg tablet Take 1 tablet by mouth 2 (two) times daily. 60 tablet 0    multivit with min-folic acid 200 mcg Chew Take 1 tablet by mouth once daily.      sertraline (ZOLOFT) 25 MG tablet Take 1 tablet (25 mg total) by mouth once daily. 90 tablet 3    sertraline (ZOLOFT) 50 MG tablet Take 1 tablet (50 mg total) by mouth once daily. 90 tablet 3    SYNTHROID 137 mcg Tab tablet Take 1 tablet (137 mcg total) by mouth before breakfast. Brandname Synthroid 30 tablet 11    progesterone (PROMETRIUM) 200 MG capsule Take 1 capsule (200 mg total) by mouth nightly. 12 capsule 11    [DISCONTINUED] fluticasone propionate (FLONASE) 50 mcg/actuation nasal spray 1 spray (50 mcg total) by Each Nostril route once daily. 16 g 0     No current facility-administered medications on file prior to visit.     She is allergic to pcn [penicillins] and clindamycin..      BP Readings from Last 3 Encounters:   24 122/80   24 120/78   24 116/82        Neck circumference  "14"      MMRC Dyspnea Scale (4 is worst)     [x] MMRC 0: Dyspneic on strenuous excercise (0 points)    [] MMRC 1: Dyspneic on walking a slight hill (0 points)    [] MMRC 2: Dyspneic on walking level ground; must stop occasionally due to breathlessness (1 point)    [] MMRC 3: Must stop for breathlessness after walking 100 yards or after a few minutes (2 points)    [] MMRC 4: Cannot leave house; breathless on dressing/undressing (3 points)                       6/25/2024     8:30 AM   EPWORTH SLEEPINESS SCALE   Sitting and reading 0   Watching TV 0   Sitting, inactive in a public place (e.g. a theatre or a meeting) 0   As a passenger in a car for an hour without a break 0   Lying down to rest in the afternoon when circumstances permit 0   Sitting and talking to someone 0   Sitting quietly after a lunch without alcohol 0   In a car, while stopped for a few minutes in traffic 0   Total score 0             Objective:     Vital Signs (Most Recent)  Height and Weight  Height: 5' 2" (157.5 cm)  Weight: 81.6 kg (180 lb)  BSA (Calculated - sq m): 1.89 sq meters  BMI (Calculated): 32.9  Weight in (lb) to have BMI = 25: 136.4]  Wt Readings from Last 2 Encounters:   06/25/24 81.6 kg (180 lb)   01/12/24 81.3 kg (179 lb 3.7 oz)       Physical Exam  Vitals and nursing note reviewed.   Constitutional:       Appearance: She is normal weight.   HENT:      Head: Normocephalic and atraumatic.      Nose: Nose normal.   Eyes:      Pupils: Pupils are equal, round, and reactive to light.   Cardiovascular:      Rate and Rhythm: Normal rate and regular rhythm.      Pulses: Normal pulses.      Heart sounds: Normal heart sounds.   Pulmonary:      Effort: Pulmonary effort is normal.      Breath sounds: Normal breath sounds.   Abdominal:      General: Bowel sounds are normal.      Palpations: Abdomen is soft.   Musculoskeletal:      Cervical back: Normal range of motion.   Skin:     General: Skin is warm.   Neurological:      General: No focal " "deficit present.      Mental Status: She is alert and oriented to person, place, and time.   Psychiatric:         Mood and Affect: Mood normal.          Laboratory  Lab Results   Component Value Date    WBC 3.85 (L) 01/10/2024    RBC 4.30 01/10/2024    HGB 12.0 01/10/2024    HCT 37.8 01/10/2024    MCV 88 01/10/2024    MCH 27.9 01/10/2024    MCHC 31.7 (L) 01/10/2024    RDW 13.5 01/10/2024     01/10/2024    MPV 9.3 01/10/2024    GRAN 1.5 (L) 01/10/2024    GRAN 39.2 01/10/2024    LYMPH 1.6 01/10/2024    LYMPH 41.6 01/10/2024    MONO 0.3 01/10/2024    MONO 8.8 01/10/2024    EOS 0.4 01/10/2024    BASO 0.04 01/10/2024    EOSINOPHIL 9.4 (H) 01/10/2024    BASOPHIL 1.0 01/10/2024       BMP  Lab Results   Component Value Date     01/10/2024    K 3.9 01/10/2024     01/10/2024    CO2 23 01/10/2024    BUN 13 01/10/2024    CREATININE 0.7 01/10/2024    CALCIUM 8.9 01/10/2024    ANIONGAP 9 01/10/2024    ESTGFRAFRICA >60.0 06/03/2019    EGFRNONAA >60.0 06/03/2019    AST 25 01/10/2024    ALT 24 01/10/2024    PROT 7.0 01/10/2024          No results found for: "IGE"     No results found for: "ASPERGILLUS"  No results found for: "AFUMIGATUSCL"     No results found for: "ACE"     Diagnostic Results:  I have personally reviewed today the following studies:    CT Chest With Contrast  Narrative: EXAMINATION:  CT CHEST WITH CONTRAST    CLINICAL HISTORY:  Abnormal x-ray    TECHNIQUE:  Axial CT images were obtained through the chest after IV administration of 75 cc Omnipaque 350 contrast.  Coronal and sagittal reconstructions submitted and interpreted.  Total .  Automated exposure control utilized.    COMPARISON:  Radiographs 01/11/2024    FINDINGS:  No focal consolidation, pneumothorax or pleural effusion.  No suspicious lung nodule or mass.    Heart is normal size.  Thoracic aorta is normal in caliber.  Central airways are clear.    No enlarged lymph nodes.    No acute or suspicious osseous findings.    Visualized " portions of the upper abdomen show no acute findings.  Impression: 1. No acute findings in the chest.  2. No nodular abnormality is identified in the right hemithorax to correspond to the findings on x-ray.  Findings likely reflect pulmonary vasculature.    Electronically signed by: Selvin Raphael MD  Date:    2024  Time:    09:33       2024 - 2024  Patient ID: [id number]  : 1985  Age: 38 years  Gender: Female  [facility]  [id number] [address], [id number]  Compliance Report  Compliance  Payor Standard  Usage 2024 - 2024  Usage days 26/30 days (87%)  >= 4 hours 24 days (80%)  < 4 hours 2 days (7%)  Usage hours 179 hours 5 minutes  Average usage (total days) 5 hours 58 minutes  Average usage (days used) 6 hours 53 minutes  Median usage (days used) 7 hours 23 minutes  Total used hours (value since last reset - 2024) 981 hours  AirSense 11 AutoSet  Serial number [id number]  Mode AutoSet  Min Pressure 5 cmH2O  Max Pressure 20 cmH2O  EPR Fulltime  EPR level 3  Response Standard  Therapy  Pressure - cmH2O Median: 7.5 95th percentile: 11.4 Maximum: 13.2  Leaks - L/min Median: 0.0 95th percentile: 3.6 Maximum: 9.5  Events per hour AI: 4.4 HI: 0.2 AHI: 4.6  Apnea Index Central: 3.8 Obstructive: 0.5 Unknown: 0.1  RERA Index 0.2      Assessment/Plan:     Problem List Items Addressed This Visit       Pulmonary nodule - Primary    SUE (obstructive sleep apnea)     Data  to 2024  Usage > 4 hrs was 80%  APAP 5-20  AHI 4.6          USING AND BENEFITS     Follow up in about 1 year (around 2025), or download,.    This note was prepared using voice recognition system and is likely to have sound alike errors that may have been overlooked even after proof reading.  Please call me with any questions    Discussed diagnosis, its evaluation, treatment and usual course. All questions answered.    Thank you for the courtesy of participating in the care of  this patient    Izaiah Jacques MD      Personal Diagnostic Review  []  CXR    []  ECHO    []  ONSAT    []  6MWD    []  LABS    []  CHEST CT    []  PET CT    []  Biopsy results

## 2024-08-22 ENCOUNTER — OFFICE VISIT (OUTPATIENT)
Dept: FAMILY MEDICINE | Facility: CLINIC | Age: 39
End: 2024-08-22
Payer: COMMERCIAL

## 2024-08-22 DIAGNOSIS — F98.8 ATTENTION DEFICIT DISORDER, UNSPECIFIED HYPERACTIVITY PRESENCE: Primary | ICD-10-CM

## 2024-08-22 PROCEDURE — 99213 OFFICE O/P EST LOW 20 MIN: CPT | Mod: 95,,, | Performed by: PHYSICIAN ASSISTANT

## 2024-08-22 PROCEDURE — 1160F RVW MEDS BY RX/DR IN RCRD: CPT | Mod: CPTII,95,, | Performed by: PHYSICIAN ASSISTANT

## 2024-08-22 PROCEDURE — 1159F MED LIST DOCD IN RCRD: CPT | Mod: CPTII,95,, | Performed by: PHYSICIAN ASSISTANT

## 2024-08-22 RX ORDER — DEXTROAMPHETAMINE SACCHARATE, AMPHETAMINE ASPARTATE, DEXTROAMPHETAMINE SULFATE AND AMPHETAMINE SULFATE 5; 5; 5; 5 MG/1; MG/1; MG/1; MG/1
1 TABLET ORAL 2 TIMES DAILY
Qty: 60 TABLET | Refills: 0 | Status: SHIPPED | OUTPATIENT
Start: 2024-09-21 | End: 2024-10-21

## 2024-08-22 RX ORDER — DEXTROAMPHETAMINE SACCHARATE, AMPHETAMINE ASPARTATE, DEXTROAMPHETAMINE SULFATE AND AMPHETAMINE SULFATE 5; 5; 5; 5 MG/1; MG/1; MG/1; MG/1
1 TABLET ORAL 2 TIMES DAILY
Qty: 60 TABLET | Refills: 0 | Status: SHIPPED | OUTPATIENT
Start: 2024-08-22 | End: 2024-09-21

## 2024-08-22 RX ORDER — DEXTROAMPHETAMINE SACCHARATE, AMPHETAMINE ASPARTATE, DEXTROAMPHETAMINE SULFATE AND AMPHETAMINE SULFATE 5; 5; 5; 5 MG/1; MG/1; MG/1; MG/1
1 TABLET ORAL 2 TIMES DAILY
Qty: 60 TABLET | Refills: 0 | Status: SHIPPED | OUTPATIENT
Start: 2024-10-21 | End: 2024-11-20

## 2024-08-22 NOTE — PROGRESS NOTES
Primary Care Telemedicine Note  The patient location is: Patient Home   The chief complaint leading to consultation is: Attention Deficit Disorder  Total time spent with patient: 10 minutes    Visit type: Virtual visit with synchronous audio only and video  Each patient to whom he or she provides medical services by telemedicine is: (1) informed of the relationship between the physician and patient and the respective role of any other health care provider with respect to management of the patient; and (2) notified that he or she may decline to receive medical services by telemedicine and may withdraw from such care at any time.      Assessment/Plan:    Problem List Items Addressed This Visit          Psychiatric    Attention deficit disorder - Primary    Overview     -chronic; stable on current medication  -currently on adderall 20 mg BID  -denies AE of medications  -The patient was checked in the [place] [employer]'s Prescription Monitoring Program. There appears to be no incongruities with the patient's verbalized history.          Relevant Medications    dextroamphetamine-amphetamine (ADDERALL) 20 mg tablet    dextroamphetamine-amphetamine (ADDERALL) 20 mg tablet (Start on 9/21/2024)    dextroamphetamine-amphetamine (ADDERALL) 20 mg tablet (Start on 10/21/2024)       Follow up in about 3 months (around 11/22/2024), or if symptoms worsen or fail to improve, for Virtual Visit.    Aleida Wallace PA-C  _____________________________________________________________________________________________________________________________________________________    CC: Attention Deficit Disorder    HPI: The patient is being seen via telehealth today to discuss the chronic use of attention deficit medications. The patient is enrolled in the Telemedicine ADHD Program and receives 3/4 refills via Telehealth with 1/4 being in person. This visit is 2/3 of the telemedicine visits in that rotation. The patient has been  on current medicine for the last several years and has been stable on the current dose during that time. The patient has been compliant on the medicine and is not receiving narcotics from any other physician. The  for the State of [place] has been viewed. The patient denies any complications from taking the medicine. The patient's weight and appetite have been stable and has not had difficulties with agitation. The patient reports improvement in the symptoms with the current dose. No other complaints today.      Past Medical History:   Diagnosis Date    ADHD (attention deficit hyperactivity disorder)     Allergy     Anxiety     Depression     Thyroid disease      Past Surgical History:   Procedure Laterality Date     SECTION       SECTION N/A 2021    Procedure:  SECTION;  Surgeon: Sai Harris MD;  Location: [facility] [place] L[facility];  Service: OB/GYN;  Laterality: N/A;    EYE SURGERY  one at 5 yo and one at 11 yo    STRABISMUS SURGERY       Review of patient's allergies indicates:   Allergen Reactions    Pcn [penicillins] Hives    Clindamycin Itching and Rash     Social History     Tobacco Use    Smoking status: Former     Current packs/day: 0.00     Types: Cigarettes     Quit date: 2011     Years since quittin.8    Smokeless tobacco: Never   Substance Use Topics    Alcohol use: No    Drug use: Never     Family History   Problem Relation Name Age of Onset    Macular degeneration Maternal Grandmother      Breast cancer Paternal Grandmother      Macular degeneration Paternal Grandfather      Ovarian cancer Neg Hx      Glaucoma Neg Hx       Current Outpatient Medications on File Prior to Visit   Medication Sig Dispense Refill    multivit with min-folic acid 200 mcg Chew Take 1 tablet by mouth once daily.      progesterone (PROMETRIUM) 200 MG capsule Take 1 capsule (200 mg total) by mouth nightly. 12 capsule 11    sertraline (ZOLOFT) 25 MG tablet Take 1 tablet (25 mg total) by mouth once daily.  90 tablet 3    sertraline (ZOLOFT) 50 MG tablet Take 1 tablet (50 mg total) by mouth once daily. 90 tablet 3    SYNTHROID 137 mcg Tab tablet Take 1 tablet (137 mcg total) by mouth before breakfast. Brandname Synthroid 30 tablet 11    [DISCONTINUED] dextroamphetamine-amphetamine (ADDERALL) 20 mg tablet Take 1 tablet by mouth 2 (two) times daily. 60 tablet 0     No current facility-administered medications on file prior to visit.       Review of Systems   Constitutional:  Negative for chills, fever and malaise/fatigue.   HENT:  Negative for hearing loss.    Eyes:  Negative for discharge.   Respiratory:  Negative for cough, shortness of breath and wheezing.    Cardiovascular:  Negative for chest pain, palpitations and leg swelling.   Gastrointestinal:  Negative for abdominal pain, blood in stool, constipation, diarrhea and vomiting.   Genitourinary:  Negative for dysuria, frequency and hematuria.   Musculoskeletal:  Negative for back pain, joint pain and neck pain.   Neurological:  Negative for weakness and headaches.   Endo/Heme/Allergies:  Negative for polydipsia.   Psychiatric/Behavioral:  Negative for depression. The patient is not nervous/anxious.        Physical Exam  Constitutional:       General: She is not in acute distress.     Appearance: She is well-developed.   HENT:      Head: Normocephalic and atraumatic.   Pulmonary:      Effort: Pulmonary effort is normal. No respiratory distress.   Abdominal:      General: There is no distension.   Musculoskeletal:         General: Normal range of motion.      Cervical back: Normal range of motion.   Neurological:      Mental Status: She is alert and oriented to person, place, and time.   Psychiatric:         Mood and Affect: Mood normal.         Behavior: Behavior normal.         The patient's Health Maintenance was reviewed and the following appears to be due at this time:  Health Maintenance Due   Topic Date Due    Pneumococcal Vaccines (Age 0-64) (1 of 2 - PCV)  Never done    COVID-19 Vaccine (4 - 2023-24 season) 09/01/2023

## 2024-09-19 ENCOUNTER — PATIENT MESSAGE (OUTPATIENT)
Dept: FAMILY MEDICINE | Facility: CLINIC | Age: 39
End: 2024-09-19
Payer: COMMERCIAL

## 2024-09-19 ENCOUNTER — TELEPHONE (OUTPATIENT)
Dept: PSYCHIATRY | Facility: CLINIC | Age: 39
End: 2024-09-19
Payer: COMMERCIAL

## 2024-09-19 NOTE — TELEPHONE ENCOUNTER
----- Message from Tiffanie Moya sent at 9/19/2024  9:14 AM CDT -----  Contact: maria del carmen Frank is requesting a call back to get an appointment scheduled. Please give her a call back at [phone]

## 2024-09-26 ENCOUNTER — OFFICE VISIT (OUTPATIENT)
Dept: FAMILY MEDICINE | Facility: CLINIC | Age: 39
End: 2024-09-26
Payer: COMMERCIAL

## 2024-09-26 ENCOUNTER — PATIENT MESSAGE (OUTPATIENT)
Dept: FAMILY MEDICINE | Facility: CLINIC | Age: 39
End: 2024-09-26

## 2024-09-26 DIAGNOSIS — L98.9 SKIN LESION OF FACE: ICD-10-CM

## 2024-09-26 DIAGNOSIS — L03.211 CELLULITIS OF FACE: Primary | ICD-10-CM

## 2024-09-26 PROCEDURE — 1160F RVW MEDS BY RX/DR IN RCRD: CPT | Mod: CPTII,95,, | Performed by: PHYSICIAN ASSISTANT

## 2024-09-26 PROCEDURE — 99213 OFFICE O/P EST LOW 20 MIN: CPT | Mod: 95,,, | Performed by: PHYSICIAN ASSISTANT

## 2024-09-26 PROCEDURE — 1159F MED LIST DOCD IN RCRD: CPT | Mod: CPTII,95,, | Performed by: PHYSICIAN ASSISTANT

## 2024-09-26 RX ORDER — MUPIROCIN 20 MG/G
OINTMENT TOPICAL 2 TIMES DAILY
Qty: 30 G | Refills: 2 | Status: SHIPPED | OUTPATIENT
Start: 2024-09-26 | End: 2024-10-06

## 2024-09-26 RX ORDER — DOXYCYCLINE 100 MG/1
100 CAPSULE ORAL 2 TIMES DAILY
Qty: 20 CAPSULE | Refills: 0 | Status: SHIPPED | OUTPATIENT
Start: 2024-09-26 | End: 2024-10-06

## 2024-09-26 NOTE — PROGRESS NOTES
Primary Care Telemedicine Note  The patient location is: Patient Home   The chief complaint leading to consultation is: Skin lesions  Total time spent with patient: 10 minutes    Visit type: Virtual visit with synchronous audio only and video  Each patient to whom he or she provides medical services by telemedicine is: (1) informed of the relationship between the physician and patient and the respective role of any other health care provider with respect to management of the patient; and (2) notified that he or she may decline to receive medical services by telemedicine and may withdraw from such care at any time.      Assessment/Plan:    Problem List Items Addressed This Visit    None  Visit Diagnoses       Cellulitis of face    -  Primary    Relevant Medications    doxycycline (VIBRAMYCIN) 100 MG Cap    mupirocin (BACTROBAN) 2 % ointment    Skin lesion of face        Relevant Medications    doxycycline (VIBRAMYCIN) 100 MG Cap    mupirocin (BACTROBAN) 2 % ointment            Follow up if symptoms worsen or fail to improve.    Aleida Wallace PA-C  _____________________________________________________________________________________________________________________________________________________    CC: skin lesions    HPI: Patient is an established patient who presents today via virtual visit for skin lesions.    DERMATOLOGICAL CONCERNS:  She reports recurrent skin picking, particularly on her face. She denies any oozing, pain, or fever associated with the skin irritation. She states that previous treatment with doxycycline was helpful for her skin condition. She is currently using mupirocin ointment on the affected areas and requests a refill.    PSYCHIATRIC CARE:  She reports difficulty obtaining a psychiatry appointment through [facility]. Her previously scheduled appointment for September 3rd was canceled without explanation. Despite being told she would be contacted for rescheduling, she has not received any  communication. She has attempted to call and reschedule but was unsuccessful. She was also unable to proceed with a telehealth appointment due to [place] regulations regarding prescribing narcotics.     No other complaints today.    Past Medical History:   Diagnosis Date    ADHD (attention deficit hyperactivity disorder)     Allergy     Anxiety     Depression     Thyroid disease      Past Surgical History:   Procedure Laterality Date     SECTION       SECTION N/A 2021    Procedure:  SECTION;  Surgeon: Sai Harris MD;  Location: [facility];  Service: OB/GYN;  Laterality: N/A;    EYE SURGERY  one at 5 yo and one at 11 yo    STRABISMUS SURGERY       Review of patient's allergies indicates:   Allergen Reactions    Pcn [penicillins] Hives    Clindamycin Itching and Rash     Social History     Tobacco Use    Smoking status: Former     Current packs/day: 0.00     Types: Cigarettes     Quit date: 2011     Years since quittin.9    Smokeless tobacco: Never   Substance Use Topics    Alcohol use: No    Drug use: Never     Family History   Problem Relation Name Age of Onset    Macular degeneration Maternal Grandmother      Breast cancer Paternal Grandmother      Macular degeneration Paternal Grandfather      Ovarian cancer Neg Hx      Glaucoma Neg Hx       Current Outpatient Medications on File Prior to Visit   Medication Sig Dispense Refill    dextroamphetamine-amphetamine (ADDERALL) 20 mg tablet Take 1 tablet by mouth 2 (two) times daily. 60 tablet 0    [START ON 10/21/2024] dextroamphetamine-amphetamine (ADDERALL) 20 mg tablet Take 1 tablet by mouth 2 (two) times daily. 60 tablet 0    multivit with min-folic acid 200 mcg Chew Take 1 tablet by mouth once daily.      progesterone (PROMETRIUM) 200 MG capsule Take 1 capsule (200 mg total) by mouth nightly. 12 capsule 11    sertraline (ZOLOFT) 25 MG tablet Take 1 tablet (25 mg total) by mouth once daily. 90 tablet 3    sertraline  (ZOLOFT) 50 MG tablet Take 1 tablet (50 mg total) by mouth once daily. 90 tablet 3    SYNTHROID 137 mcg Tab tablet Take 1 tablet (137 mcg total) by mouth before breakfast. Brandname Synthroid 30 tablet 11     No current facility-administered medications on file prior to visit.       Review of Systems   Constitutional:  Negative for chills, fever and malaise/fatigue.   HENT:  Negative for hearing loss.    Eyes:  Negative for discharge.   Respiratory:  Negative for cough, shortness of breath and wheezing.    Cardiovascular:  Negative for chest pain, palpitations and leg swelling.   Gastrointestinal:  Negative for abdominal pain, blood in stool, constipation, diarrhea and vomiting.   Genitourinary:  Negative for dysuria, frequency and hematuria.   Musculoskeletal:  Negative for back pain, joint pain and neck pain.   Neurological:  Negative for weakness and headaches.   Endo/Heme/Allergies:  Negative for polydipsia.   Psychiatric/Behavioral:  Negative for depression. The patient is not nervous/anxious.        Physical Exam  Constitutional:       General: She is not in acute distress.     Appearance: She is well-developed.   HENT:      Head: Normocephalic and atraumatic.      Comments: Skin lesions of face  Pulmonary:      Effort: Pulmonary effort is normal. No respiratory distress.   Abdominal:      General: There is no distension.   Musculoskeletal:         General: Normal range of motion.      Cervical back: Normal range of motion.   Neurological:      Mental Status: She is alert and oriented to person, place, and time.   Psychiatric:         Mood and Affect: Mood normal.         Behavior: Behavior normal.         The patient's Health Maintenance was reviewed and the following appears to be due at this time:  Health Maintenance Due   Topic Date Due    Pneumococcal Vaccines (Age 0-64) (1 of 2 - PCV) Never done    Influenza Vaccine (1) 09/01/2024    COVID-19 Vaccine (4 - 2023-24 season) 09/01/2024       DISCLAIMER: This  note was compiled by using a speech recognition dictation system and therefore please be aware that typographical / speech recognition errors can and do occur.  Please contact me if you see any errors specifically.  Consent was obtained for Deep[employer] recording system prior to the visit.

## 2024-09-26 NOTE — PATIENT INSTRUCTIONS
Psychiatry, Psychotherapy, Licensed Counselors    [facility]  [place]: [phone]  [place]: [phone]  [place]: [phone]  [place]: [phone]    [facility]   [address], [place] [address]  [place] [phone]    [facility]  [address]  [place] [id number] [phone]  Leeann Lopez, Ph.D., M.P  Taj Zuleta, Ph.D., M.P      [facility]  [address] [place] [address]  [facility]  [phone]  Kiah Zuluaga LPC, [place]  [phone]        Janis Oseguera, [facility], LMFT  [address]  [address]  [place] [address] [phone]        Carlene French MA, LPC  Phone: [phone]  Fax: [phone]  7yrs- Adult  Areas of Service: Depression, anxiety, medication management, substance abuse, anger management, coping with grief, communication skills, parenting skills, and addiction.  Accepts private insurance and cash payments        Dr. James Heaton- [place]  [facility]  [address]   [address]  [place], [place] [address] [phone]  ACCEPTED INSURANCE:  [facility]  Aetna  [facility]  [facility]  Com Psych  Humana  [place]  [facility]  Value Options        Salazar Jackson LPC  Jewish counselor  [facility]  [address], [place] [id number] [phone]  AVG Cost per Session: $80 - $130  ACCEPTED INSURANCE:  BC  Value Options  (License to [facility])          [facility]  [address] [place] [address]  Accept Medicaid  Other [facility]  Jordyn Lees C.H.GLORIA RAPPH.C  [facility]  [facility].  [place]  C.H.C  [place] [facility]  [place] [facility]    Our skilled providers specialize in treating:  PTSD, Anxiety and Depression, Bipolar Disorder, ADHD, Obesity, Marital Distress, Chronic Tension, Panic Attacks, as well as Phobias          [facility]  [address] [place] [address]  Hours: Monday-Friday, 8 a.m.-5 p.m  Phone: [phone]  [facility]  [address] [place] [address]  Tel. [phone]  Fax [phone]  [facility]  [address] [place] [address]  Tel. [phone]  Fax [phone]  [facility]  [address] [place] [address]  Tel. [phone]  Fax [phone]  [facility]  [address] [place], [place] [address] [place] [address]  Tel. [phone]  Fax [phone]  Walk -In till 4pm all insurances accepted      [facility]  Our skilled providers specialize in treating:  Attention Deficit Hyperactivity Disorder  Attention Deficit Disorder  Obsessive Compulsive Disorder   Autism  Bipolar Disorder  Depression   Anxiety  A variety of other psychological disorders  [place]  Phone: [phone]  [place]  [address], [place], [address]  [phone]  [place]  [address] [place], [place], [address]  [phone]  [place]  [address] [place], [address]  [phone]  [facility]  [address] [place] [address]  [phone]  ACCEPTED INSURANCE:  AETNA  [facility]  CIGNA  [place]IL[place]  HUMANA  [place]  [place] MEDICAID    MULTIPLAN  PHCS UNITED HEALTHCARE [facility]  MD Luis Carlos Bhat NP Elisa Himel, NP  The providers of [facility] [place] help patients age 16 and over with the treatment of a variety of  mental disorders, including:  Stress  Depression  Anxiety  Schizophrenia  Dementia  Bipolar  Developmental disabilities  Other psychiatric mental health issues  [facility]   [address]   [place] [address]   Hours: Monday-Friday, 8 a.m.-5 p.m.   Phone: [phone]   Fax: [phone]  ACCEPTED INSURANCE  [facility] (PPO, OGB-PPO)*  Humana  Medicare  *PPO: Preferred Provider Organization        Medicaid Psychiatric hospital [facility]: [phone]  [facility]: [phone]  [facility]: [phone]  [facility]: [phone]  [facility]: [phone]      [facility]   [facility]   Addiction   [address] [facility] [place] [address] [phone]

## 2024-09-26 NOTE — Clinical Note
Hi,  My patient was supposed to have an appointment with Dr. Damico on 9/3. This appointment was cancelled and not rescheduled. Can you please assist with rescheduling the patient with Dr. Damico's at her next available appointment?  Thank you, Aleida Wallace PA-C

## 2024-11-22 ENCOUNTER — ON-DEMAND VIRTUAL (OUTPATIENT)
Dept: URGENT CARE | Facility: CLINIC | Age: 39
End: 2024-11-22
Payer: COMMERCIAL

## 2024-11-22 DIAGNOSIS — B34.9 ACUTE VIRAL SYNDROME: Primary | ICD-10-CM

## 2024-11-22 NOTE — PATIENT INSTRUCTIONS
See additional patient Instructions provided    - Rest.    - Drink plenty of fluids.  - Bacterial infections can be treated with oral antibiotics, however, Viral upper respiratory infections will not respond to antibiotics but with simple symptomatic care, typically run their course in 10-14 days.     - Tylenol or Ibuprofen as directed as needed for fever/pain. Avoid tylenol if you have a history of liver disease. Do not take ibuprofen if you have a history of GI bleeding, kidney disease, or if you take blood thinners.     - You can take over-the-counter claritin, zyrtec, allegra, or xyzal as directed. These are antihistamines that can help with runny nose, nasal congestion, sneezing, and helps to dry up post-nasal drip, which usually causes sore throat and cough.   - If you do NOT have high blood pressure, you may use a decongestant form (D)  of this medication (ie. Claritin- D, zyrtec-D, allegra-D) or if you do not take the D form, you can take sudafed (pseudoephedrine) over the counter, which is a decongestant. Do NOT take two decongestant (D) medications at the same time (such as mucinex-D and claritin-D or plain sudafed and claritin D)    - You can use Flonase (fluticasone) nasal spray as directed for sinus congestion and postnasal drip. This is a steroid nasal spray that works locally over time to decrease the inflammation in your nose/sinuses and help with allergic symptoms. This is not an quick- relief spray like afrin, but it works well if used daily.  Discontinue if you develop nose bleed  - use nasal saline prior to Flonase.  - Use Ocean Spray Nasal Saline 1-3 puffs each nostril every 2-3 hours then blow out onto tissue. This is to irrigate the nasal passage way to clear the sinus openings. Use until sinus problem resolved.    - you can take plain Mucinex (guaifenesin) 1200 mg twice a day to help loosen mucous.     -warm salt water gargles can help with sore throat    - warm tea with honey can help with  cough. Honey is a natural cough suppressant.    - Dextromethorphan (DM) is a cough suppressant over the counter (ie. mucinex DM, robitussin, delsym; dayquil/nyquil has DM as well.)    - Follow up with your PCP or specialty clinic as directed in the next 1-2 weeks if not improved or as needed.  You can call [phone] to schedule an appointment with the appropriate provider.      - Go to the ER if you develop new or worsening symptoms.     - You must understand that you have received an Urgent Care treatment only and that you may be released before all of your medical problems are known or treated.   - You, the patient, will arrange for follow up care as instructed.   - If your condition worsens or fails to improve we recommend that you receive another evaluation at the ER immediately or contact your PCP to discuss your concerns or return here.     Patient Instructions   - You must understand that you have received an Urgent Care treatment only and that you may be released before all of your medical problems are known or treated.   - You, the patient, will arrange for follow up care as instructed.   - If your condition worsens or fails to improve we recommend that you receive another evaluation at the ER immediately or contact your PCP to discuss your concerns or return here.     Advised on return/follow-up precautions. Advised on ER precautions. Answered all patient questions. Patient verbalized understanding and voiced agreement with current treatment plan.

## 2024-11-22 NOTE — PROGRESS NOTES
Subjective:      Patient ID: Monika Connell is a 39 y.o. female.    Vitals:  vitals were not taken for this visit.     Chief Complaint: Sinus Problem      Visit Type: TELE AUDIOVISUAL    Present with the patient at the time of consultation: TELEMED PRESENT WITH PATIENT: None    Past Medical History:   Diagnosis Date    ADHD (attention deficit hyperactivity disorder)     Allergy     Anxiety     Depression     Thyroid disease      Past Surgical History:   Procedure Laterality Date     SECTION       SECTION N/A 2021    Procedure:  SECTION;  Surgeon: Sai Harris MD;  Location: [facility];  Service: OB/GYN;  Laterality: N/A;    EYE SURGERY  one at 7 yo and one at 9 yo    STRABISMUS SURGERY       Review of patient's allergies indicates:   Allergen Reactions    Pcn [penicillins] Hives    Clindamycin Itching and Rash     Current Outpatient Medications on File Prior to Visit   Medication Sig Dispense Refill    dextroamphetamine-amphetamine (ADDERALL) 20 mg tablet Take 1 tablet by mouth 2 (two) times daily. 60 tablet 0    multivit with min-folic acid 200 mcg Chew Take 1 tablet by mouth once daily.      progesterone (PROMETRIUM) 200 MG capsule Take 1 capsule (200 mg total) by mouth nightly. 12 capsule 11    sertraline (ZOLOFT) 25 MG tablet Take 1 tablet (25 mg total) by mouth once daily. 90 tablet 3    sertraline (ZOLOFT) 50 MG tablet Take 1 tablet (50 mg total) by mouth once daily. 90 tablet 3    SYNTHROID 137 mcg Tab tablet Take 1 tablet (137 mcg total) by mouth before breakfast. Brandname Synthroid 30 tablet 11     No current facility-administered medications on file prior to visit.     Family History   Problem Relation Name Age of Onset    Macular degeneration Maternal Grandmother      Breast cancer Paternal Grandmother      Macular degeneration Paternal Grandfather      Ovarian cancer Neg Hx      Glaucoma Neg Hx             Ohs Peq Odvv Intake    2024 11:00 AM CST - Filed by Patient    What is your current physical address in the event of a medical emergency? [address] [place] [address]   Are you able to take your vital signs? No   Please attach any relevant images or files    Is your employer contracted with [facility]? No         Patient states visiting from [facility]. C/o sore throat , burning chest x 2 days  Denies fever, chills, body aches, fatigue   Tx includes nothing         Constitution: Negative for chills, sweating, fatigue and fever.   HENT:  Positive for sore throat. Negative for ear pain, ear discharge, tinnitus, hearing loss, congestion, sinus pain, sinus pressure, trouble swallowing and voice change.    Neck: Negative for neck pain and painful lymph nodes.   Cardiovascular:  Negative for chest pain, palpitations and sob on exertion.   Respiratory:  Positive for cough. Negative for sputum production, shortness of breath and wheezing.    Gastrointestinal:  Negative for abdominal pain, nausea, vomiting and diarrhea.   Musculoskeletal:  Negative for muscle ache.   Skin:  Negative for color change, pale and rash.   Allergic/Immunologic: Negative for sneezing.   Neurological:  Negative for headaches, numbness and tingling.   Hematologic/Lymphatic: Negative for swollen lymph nodes.        Objective:   The physical exam was conducted virtually.  Physical Exam   Constitutional: She is oriented to person, place, and time. No distress.   HENT:   Head: Normocephalic and atraumatic.   Nose: Congestion present.   Mouth/Throat: Oropharynx is clear and moist and mucous membranes are normal.   Eyes: Conjunctivae are normal. No scleral icterus.   Pulmonary/Chest: Effort normal. No respiratory distress.   Musculoskeletal: Normal range of motion.         General: Normal range of motion.   Neurological: She is alert and oriented to person, place, and time.   Skin: Skin is not diaphoretic.   Psychiatric: Her behavior is normal. Judgment and thought content normal.   Vitals  reviewed.      Assessment:     1. Acute viral syndrome        Plan:       Acute viral syndrome      Patient Instructions   See additional patient Instructions provided    - Rest.    - Drink plenty of fluids.  - Bacterial infections can be treated with oral antibiotics, however, Viral upper respiratory infections will not respond to antibiotics but with simple symptomatic care, typically run their course in 10-14 days.     - Tylenol or Ibuprofen as directed as needed for fever/pain. Avoid tylenol if you have a history of liver disease. Do not take ibuprofen if you have a history of GI bleeding, kidney disease, or if you take blood thinners.     - You can take over-the-counter claritin, zyrtec, allegra, or xyzal as directed. These are antihistamines that can help with runny nose, nasal congestion, sneezing, and helps to dry up post-nasal drip, which usually causes sore throat and cough.   - If you do NOT have high blood pressure, you may use a decongestant form (D)  of this medication (ie. Claritin- D, zyrtec-D, allegra-D) or if you do not take the D form, you can take sudafed (pseudoephedrine) over the counter, which is a decongestant. Do NOT take two decongestant (D) medications at the same time (such as mucinex-D and claritin-D or plain sudafed and claritin D)    - You can use Flonase (fluticasone) nasal spray as directed for sinus congestion and postnasal drip. This is a steroid nasal spray that works locally over time to decrease the inflammation in your nose/sinuses and help with allergic symptoms. This is not an quick- relief spray like afrin, but it works well if used daily.  Discontinue if you develop nose bleed  - use nasal saline prior to Flonase.  - Use Ocean Spray Nasal Saline 1-3 puffs each nostril every 2-3 hours then blow out onto tissue. This is to irrigate the nasal passage way to clear the sinus openings. Use until sinus problem resolved.    - you can take plain Mucinex (guaifenesin) 1200 mg twice a  day to help loosen mucous.     -warm salt water gargles can help with sore throat    - warm tea with honey can help with cough. Honey is a natural cough suppressant.    - Dextromethorphan (DM) is a cough suppressant over the counter (ie. mucinex DM, robitussin, delsym; dayquil/nyquil has DM as well.)    - Follow up with your PCP or specialty clinic as directed in the next 1-2 weeks if not improved or as needed.  You can call [phone] to schedule an appointment with the appropriate provider.      - Go to the ER if you develop new or worsening symptoms.     - You must understand that you have received an Urgent Care treatment only and that you may be released before all of your medical problems are known or treated.   - You, the patient, will arrange for follow up care as instructed.   - If your condition worsens or fails to improve we recommend that you receive another evaluation at the ER immediately or contact your PCP to discuss your concerns or return here.     Patient Instructions   - You must understand that you have received an Urgent Care treatment only and that you may be released before all of your medical problems are known or treated.   - You, the patient, will arrange for follow up care as instructed.   - If your condition worsens or fails to improve we recommend that you receive another evaluation at the ER immediately or contact your PCP to discuss your concerns or return here.     Advised on return/follow-up precautions. Advised on ER precautions. Answered all patient questions. Patient verbalized understanding and voiced agreement with current treatment plan.

## 2024-11-25 ENCOUNTER — OFFICE VISIT (OUTPATIENT)
Dept: FAMILY MEDICINE | Facility: CLINIC | Age: 39
End: 2024-11-25
Payer: COMMERCIAL

## 2024-11-25 VITALS — BODY MASS INDEX: 32.39 KG/M2 | WEIGHT: 176 LBS | HEIGHT: 62 IN

## 2024-11-25 DIAGNOSIS — F98.8 ATTENTION DEFICIT DISORDER, UNSPECIFIED TYPE: ICD-10-CM

## 2024-11-25 DIAGNOSIS — J06.9 UPPER RESPIRATORY TRACT INFECTION, UNSPECIFIED TYPE: Primary | ICD-10-CM

## 2024-11-25 PROCEDURE — 3008F BODY MASS INDEX DOCD: CPT | Mod: CPTII,95,, | Performed by: INTERNAL MEDICINE

## 2024-11-25 PROCEDURE — 1159F MED LIST DOCD IN RCRD: CPT | Mod: CPTII,95,, | Performed by: INTERNAL MEDICINE

## 2024-11-25 PROCEDURE — 99214 OFFICE O/P EST MOD 30 MIN: CPT | Mod: 95,,, | Performed by: INTERNAL MEDICINE

## 2024-11-25 PROCEDURE — 1160F RVW MEDS BY RX/DR IN RCRD: CPT | Mod: CPTII,95,, | Performed by: INTERNAL MEDICINE

## 2024-11-25 RX ORDER — DEXTROAMPHETAMINE SACCHARATE, AMPHETAMINE ASPARTATE, DEXTROAMPHETAMINE SULFATE AND AMPHETAMINE SULFATE 5; 5; 5; 5 MG/1; MG/1; MG/1; MG/1
1 TABLET ORAL 2 TIMES DAILY
Qty: 60 TABLET | Refills: 0 | Status: SHIPPED | OUTPATIENT
Start: 2025-01-25 | End: 2025-02-24

## 2024-11-25 RX ORDER — PREDNISONE 20 MG/1
40 TABLET ORAL DAILY
Qty: 10 TABLET | Refills: 0 | Status: SHIPPED | OUTPATIENT
Start: 2024-11-25 | End: 2024-11-30

## 2024-11-25 RX ORDER — PROMETHAZINE HYDROCHLORIDE AND DEXTROMETHORPHAN HYDROBROMIDE 6.25; 15 MG/5ML; MG/5ML
5 SYRUP ORAL EVERY 6 HOURS PRN
Qty: 118 ML | Refills: 0 | Status: SHIPPED | OUTPATIENT
Start: 2024-11-25 | End: 2024-12-05

## 2024-11-25 RX ORDER — DEXTROAMPHETAMINE SACCHARATE, AMPHETAMINE ASPARTATE, DEXTROAMPHETAMINE SULFATE AND AMPHETAMINE SULFATE 5; 5; 5; 5 MG/1; MG/1; MG/1; MG/1
1 TABLET ORAL 2 TIMES DAILY
Qty: 60 TABLET | Refills: 0 | Status: SHIPPED | OUTPATIENT
Start: 2024-12-25 | End: 2025-01-24

## 2024-11-25 RX ORDER — DEXTROAMPHETAMINE SACCHARATE, AMPHETAMINE ASPARTATE, DEXTROAMPHETAMINE SULFATE AND AMPHETAMINE SULFATE 5; 5; 5; 5 MG/1; MG/1; MG/1; MG/1
1 TABLET ORAL 2 TIMES DAILY
Qty: 60 TABLET | Refills: 0 | Status: SHIPPED | OUTPATIENT
Start: 2024-11-25 | End: 2024-12-25

## 2024-11-25 RX ORDER — SERTRALINE HYDROCHLORIDE 100 MG/1
100 TABLET, FILM COATED ORAL DAILY
Qty: 90 TABLET | Refills: 0 | Status: SHIPPED | OUTPATIENT
Start: 2024-11-25 | End: 2025-11-25

## 2024-11-25 NOTE — PROGRESS NOTES
Primary Care Telemedicine Note  The patient location is: Patient Home   The chief complaint leading to consultation is: Attention Deficit Disorder  Total time spent with patient: 10 minutes    Visit type: Virtual visit with synchronous audio only and video  Each patient to whom he or she provides medical services by telemedicine is: (1) informed of the relationship between the physician and patient and the respective role of any other health care provider with respect to management of the patient; and (2) notified that he or she may decline to receive medical services by telemedicine and may withdraw from such care at any time.      Assessment/Plan:    Problem List Items Addressed This Visit          Psychiatric    Attention deficit disorder    Overview     -chronic; stable on current medication  -currently on adderall 20 mg BID  -denies AE of medications  -The patient was checked in the [facility] Board of Pharmacy's Prescription Monitoring Program. There appears to be no incongruities with the patient's verbalized history.          Relevant Medications    dextroamphetamine-amphetamine (ADDERALL) 20 mg tablet    dextroamphetamine-amphetamine (ADDERALL) 20 mg tablet (Start on 12/25/2024)    dextroamphetamine-amphetamine (ADDERALL) 20 mg tablet (Start on 1/25/2025)     Other Visit Diagnoses       Upper respiratory tract infection, unspecified type    -  Primary  -presentation consistent with URI  -continue symptom management with cough suppressants, decongestants, and nasal steroids  -rest and increase fluid intake  -follow up in several days if symptoms not improved     Relevant Medications    predniSONE (DELTASONE) 20 MG tablet    promethazine-dextromethorphan (PROMETHAZINE-DM) 6.25-15 mg/5 mL Syrp            Follow up in about 3 months (around 2/25/2025), or if symptoms worsen or fail to improve.    Aleida Wallace  PA-C  _____________________________________________________________________________________________________________________________________________________    CC: Attention Deficit Disorder    HPI: The patient is being seen via telehealth today to discuss the chronic use of attention deficit medications. The patient is enrolled in the Telemedicine ADHD Program and receives 3/4 refills via Telehealth with 1/4 being in person. This visit is 3/3 of the telemedicine visits in that rotation. The patient has been on current medicine for the last several years and has been stable on the current dose during that time. The patient has been compliant on the medicine and is not receiving narcotics from any other physician. The  for the State of [place] has been viewed. The patient denies any complications from taking the medicine. The patient's weight and appetite have been stable and has not had difficulties with agitation. The patient reports improvement in the symptoms with the current dose. No other complaints today.     Upper Respiratory Infection: Patient complains of symptoms of a URI. Symptoms include congestion and cough. Onset of symptoms was 4 days ago, gradually worsening since that time. She also c/o post nasal drip and sinus pressure for the past several days .  She is drinking plenty of fluids. Evaluation to date: none. Treatment to date: none    No other new complaints today.          Past Medical History:   Diagnosis Date    ADHD (attention deficit hyperactivity disorder)     Allergy     Anxiety     Depression     Thyroid disease      Past Surgical History:   Procedure Laterality Date     SECTION       SECTION N/A 2021    Procedure:  SECTION;  Surgeon: Sai Harris MD;  Location: [facility] L&D;  Service: OB/GYN;  Laterality: N/A;    EYE SURGERY  one at 7 yo and one at 9 yo    STRABISMUS SURGERY       Review of patient's allergies indicates:   Allergen Reactions    Pcn [penicillins]  Hives    Clindamycin Itching and Rash     Social History     Tobacco Use    Smoking status: Former     Current packs/day: 0.00     Types: Cigarettes     Quit date: 2011     Years since quittin.0    Smokeless tobacco: Never   Substance Use Topics    Alcohol use: No    Drug use: Never     Family History   Problem Relation Name Age of Onset    Macular degeneration Maternal Grandmother      Breast cancer Paternal Grandmother      Macular degeneration Paternal Grandfather      Ovarian cancer Neg Hx      Glaucoma Neg Hx       Current Outpatient Medications on File Prior to Visit   Medication Sig Dispense Refill    multivit with min-folic acid 200 mcg Chew Take 1 tablet by mouth once daily.      sertraline (ZOLOFT) 25 MG tablet Take 1 tablet (25 mg total) by mouth once daily. 90 tablet 3    sertraline (ZOLOFT) 50 MG tablet Take 1 tablet (50 mg total) by mouth once daily. 90 tablet 3    SYNTHROID 137 mcg Tab tablet Take 1 tablet (137 mcg total) by mouth before breakfast. Brandname Synthroid 30 tablet 11    [DISCONTINUED] dextroamphetamine-amphetamine (ADDERALL) 20 mg tablet Take 1 tablet by mouth 2 (two) times daily. 60 tablet 0    progesterone (PROMETRIUM) 200 MG capsule Take 1 capsule (200 mg total) by mouth nightly. 12 capsule 11     No current facility-administered medications on file prior to visit.       Review of Systems   Constitutional:  Negative for chills, fever and malaise/fatigue.   HENT:  Positive for congestion. Negative for hearing loss and sore throat.    Eyes:  Negative for discharge.   Respiratory:  Positive for cough. Negative for shortness of breath and wheezing.    Cardiovascular:  Negative for chest pain, palpitations and leg swelling.   Gastrointestinal:  Negative for abdominal pain, blood in stool, constipation, diarrhea and vomiting.   Genitourinary:  Negative for dysuria, frequency and hematuria.   Musculoskeletal:  Negative for back pain, joint pain and neck pain.   Neurological:   Negative for weakness and headaches.   Endo/Heme/Allergies:  Negative for polydipsia.   Psychiatric/Behavioral:  Negative for depression. The patient is not nervous/anxious.        Physical Exam  Constitutional:       General: She is not in acute distress.     Appearance: She is well-developed.   HENT:      Head: Normocephalic and atraumatic.   Pulmonary:      Effort: Pulmonary effort is normal. No respiratory distress.   Abdominal:      General: There is no distension.   Musculoskeletal:         General: Normal range of motion.      Cervical back: Normal range of motion.   Neurological:      Mental Status: She is alert and oriented to person, place, and time.   Psychiatric:         Mood and Affect: Mood normal.         Behavior: Behavior normal.         The patient's Health Maintenance was reviewed and the following appears to be due at this time:  Health Maintenance Due   Topic Date Due    Pneumococcal Vaccines (Age 0-64) (1 of 2 - PCV) Never done    Influenza Vaccine (1) 09/01/2024    COVID-19 Vaccine (4 - 2024-25 season) 09/01/2024     Answers submitted by the patient for this visit:  Review of Systems Questionnaire (Submitted on 11/22/2024)  activity change: No  unexpected weight change: No  rhinorrhea: No  trouble swallowing: No  visual disturbance: No  chest tightness: No  polyuria: No  difficulty urinating: No  menstrual problem: No  joint swelling: No  arthralgias: No  confusion: No  dysphoric mood: No

## 2024-12-27 ENCOUNTER — PATIENT MESSAGE (OUTPATIENT)
Dept: PULMONOLOGY | Facility: CLINIC | Age: 39
End: 2024-12-27
Payer: COMMERCIAL

## 2025-01-02 ENCOUNTER — OFFICE VISIT (OUTPATIENT)
Dept: OPTOMETRY | Facility: CLINIC | Age: 40
End: 2025-01-02
Payer: COMMERCIAL

## 2025-01-02 ENCOUNTER — TELEPHONE (OUTPATIENT)
Dept: PSYCHIATRY | Facility: CLINIC | Age: 40
End: 2025-01-02
Payer: COMMERCIAL

## 2025-01-02 ENCOUNTER — OFFICE VISIT (OUTPATIENT)
Dept: OBSTETRICS AND GYNECOLOGY | Facility: CLINIC | Age: 40
End: 2025-01-02
Payer: COMMERCIAL

## 2025-01-02 ENCOUNTER — PATIENT MESSAGE (OUTPATIENT)
Dept: FAMILY MEDICINE | Facility: CLINIC | Age: 40
End: 2025-01-02
Payer: COMMERCIAL

## 2025-01-02 VITALS
WEIGHT: 190.69 LBS | HEIGHT: 62 IN | SYSTOLIC BLOOD PRESSURE: 118 MMHG | BODY MASS INDEX: 35.09 KG/M2 | DIASTOLIC BLOOD PRESSURE: 72 MMHG

## 2025-01-02 DIAGNOSIS — N92.6 IRREGULAR MENSES: ICD-10-CM

## 2025-01-02 DIAGNOSIS — H52.7 REFRACTIVE ERROR: Primary | ICD-10-CM

## 2025-01-02 DIAGNOSIS — Z01.419 ENCOUNTER FOR ANNUAL ROUTINE GYNECOLOGICAL EXAMINATION: Primary | ICD-10-CM

## 2025-01-02 DIAGNOSIS — Z00.00 ENCOUNTER FOR ANNUAL HEALTH EXAMINATION: Primary | ICD-10-CM

## 2025-01-02 DIAGNOSIS — Z12.31 SCREENING MAMMOGRAM FOR BREAST CANCER: ICD-10-CM

## 2025-01-02 DIAGNOSIS — E03.9 HYPOTHYROIDISM, UNSPECIFIED TYPE: ICD-10-CM

## 2025-01-02 PROCEDURE — 99999 PR PBB SHADOW E&M-EST. PATIENT-LVL III: CPT | Mod: PBBFAC,,, | Performed by: OBSTETRICS & GYNECOLOGY

## 2025-01-02 PROCEDURE — 1160F RVW MEDS BY RX/DR IN RCRD: CPT | Mod: CPTII,S$GLB,, | Performed by: OPTOMETRIST

## 2025-01-02 PROCEDURE — 92014 COMPRE OPH EXAM EST PT 1/>: CPT | Mod: S$GLB,,, | Performed by: OPTOMETRIST

## 2025-01-02 PROCEDURE — 3074F SYST BP LT 130 MM HG: CPT | Mod: CPTII,S$GLB,, | Performed by: OBSTETRICS & GYNECOLOGY

## 2025-01-02 PROCEDURE — 99395 PREV VISIT EST AGE 18-39: CPT | Mod: S$GLB,,, | Performed by: OBSTETRICS & GYNECOLOGY

## 2025-01-02 PROCEDURE — 3008F BODY MASS INDEX DOCD: CPT | Mod: CPTII,S$GLB,, | Performed by: OBSTETRICS & GYNECOLOGY

## 2025-01-02 PROCEDURE — 92015 DETERMINE REFRACTIVE STATE: CPT | Mod: S$GLB,,, | Performed by: OPTOMETRIST

## 2025-01-02 PROCEDURE — 1159F MED LIST DOCD IN RCRD: CPT | Mod: CPTII,S$GLB,, | Performed by: OBSTETRICS & GYNECOLOGY

## 2025-01-02 PROCEDURE — 1160F RVW MEDS BY RX/DR IN RCRD: CPT | Mod: CPTII,S$GLB,, | Performed by: OBSTETRICS & GYNECOLOGY

## 2025-01-02 PROCEDURE — 88175 CYTOPATH C/V AUTO FLUID REDO: CPT | Performed by: OBSTETRICS & GYNECOLOGY

## 2025-01-02 PROCEDURE — 99999 PR PBB SHADOW E&M-EST. PATIENT-LVL III: CPT | Mod: PBBFAC,,, | Performed by: OPTOMETRIST

## 2025-01-02 PROCEDURE — 1159F MED LIST DOCD IN RCRD: CPT | Mod: CPTII,S$GLB,, | Performed by: OPTOMETRIST

## 2025-01-02 PROCEDURE — 99499 UNLISTED E&M SERVICE: CPT | Mod: S$GLB,,, | Performed by: OPTOMETRIST

## 2025-01-02 PROCEDURE — 3078F DIAST BP <80 MM HG: CPT | Mod: CPTII,S$GLB,, | Performed by: OBSTETRICS & GYNECOLOGY

## 2025-01-02 NOTE — PROGRESS NOTES
Chief Complaint   Patient presents with    Well Woman       History and Physical:  Patient's last menstrual period was 2024 (exact date).       Monika Connell is a 39 y.o.  WF who presents today for her routine annual GYN exam. The patient has no Gynecology complaints today. Menses irreg, q 6 weeks, no intermenstrual bleeding, off prom. And declines OCPs      Allergies:   Review of patient's allergies indicates:   Allergen Reactions    Pcn [penicillins] Hives    Clindamycin Itching and Rash       Past Medical History:   Diagnosis Date    ADHD (attention deficit hyperactivity disorder)     Allergy     Anxiety     Depression     Thyroid disease        Past Surgical History:   Procedure Laterality Date     SECTION       SECTION N/A 2021    Procedure:  SECTION;  Surgeon: Sai Harris MD;  Location: [facility]&D;  Service: OB/GYN;  Laterality: N/A;    EYE SURGERY  one at 5 yo and one at 11 yo    STRABISMUS SURGERY         MEDS:   Current Outpatient Medications on File Prior to Visit   Medication Sig Dispense Refill    dextroamphetamine-amphetamine (ADDERALL) 20 mg tablet Take 1 tablet by mouth 2 (two) times daily. 60 tablet 0    [START ON 2025] dextroamphetamine-amphetamine (ADDERALL) 20 mg tablet Take 1 tablet by mouth 2 (two) times daily. 60 tablet 0    multivit with min-folic acid 200 mcg Chew Take 1 tablet by mouth once daily.      progesterone (PROMETRIUM) 200 MG capsule Take 1 capsule (200 mg total) by mouth nightly. 12 capsule 11    sertraline (ZOLOFT) 100 MG tablet Take 1 tablet (100 mg total) by mouth once daily. 90 tablet 0    SYNTHROID 137 mcg Tab tablet Take 1 tablet (137 mcg total) by mouth before breakfast. Brandname Synthroid 30 tablet 11     No current facility-administered medications on file prior to visit.       OB History          2    Para   2    Term   2            AB        Living   2         SAB        IAB        Ectopic        Multiple    0    Live Births   2                 Social History     Socioeconomic History    Marital status:    Occupational History     Employer: OTHER   Tobacco Use    Smoking status: Former     Current packs/day: 0.00     Types: Cigarettes     Quit date: 2011     Years since quittin.1    Smokeless tobacco: Never   Substance and Sexual Activity    Alcohol use: No    Drug use: Never    Sexual activity: Yes     Partners: Male     Birth control/protection: Condom     Social Drivers of Health     Financial Resource Strain: Low Risk  (2025)    Overall Financial Resource Strain (CARDIA)     Difficulty of Paying Living Expenses: Not very hard   Food Insecurity: No Food Insecurity (2025)    Hunger Vital Sign     Worried About Running Out of Food in the Last Year: Never true     Ran Out of Food in the Last Year: Never true   Transportation Needs: Patient Declined (2023)    PRAPARE - Transportation     Lack of Transportation (Medical): Patient declined     Lack of Transportation (Non-Medical): Patient declined   Physical Activity: Unknown (2025)    Exercise Vital Sign     Days of Exercise per Week: 0 days   Stress: Stress Concern Present (2025)    [employer] - Occupational Stress Questionnaire     Feeling of Stress : Very much   Housing Stability: Unknown (2025)    Housing Stability Vital Sign     Unable to Pay for Housing in the Last Year: No       Family History   Problem Relation Name Age of Onset    Macular degeneration Maternal Grandmother      Breast cancer Paternal Grandmother      Macular degeneration Paternal Grandfather      Ovarian cancer Neg Hx      Glaucoma Neg Hx           Past medical and surgical history reviewed.   I have reviewed the patient's medical history in detail and updated the computerized patient record.        Review of System:   General: no chills, fever, night sweats, weight gain or weight loss  Psychological: no depression or  "suicidal ideation  Breasts: no new or changing breast lumps, nipple discharge or masses.  Respiratory: no cough, shortness of breath, or wheezing  Cardiovascular: no chest pain or dyspnea on exertion  Gastrointestinal: no abdominal pain, change in bowel habits, or black or bloody stools  Genito-Urinary: no incontinence, urinary frequency/urgency or vulvar/vaginal symptoms, pelvic pain or abnormal vaginal bleeding.  Musculoskeletal: no gait disturbance or muscular weakness      Physical Exam:   /72 (BP Location: Left arm, Patient Position: Sitting)   Ht 5' 2" (1.575 m)   Wt 86.5 kg (190 lb 11.2 oz)   LMP 11/22/2024 (Exact Date)   BMI 34.88 kg/m²   Constitutional: She appears alert and responsive. She appears well-developed, well-groomed, and well-nourished. No distress.   HENT:   Head: Normocephalic and atraumatic.   Eyes: Conjunctivae and EOM are normal. No scleral icterus.   Neck: Symmetrical. Normal range of motion. Neck supple. No tracheal deviation present. THYROID:  without masses or tenderness.  Cardiovascular: Normal rate, no rhythm abnormality noted. Extremities without swelling or edema, warm.    Pulmonary/Chest: Normal respiratory Effort. No distress or retractions. She exhibits no tenderness.  Breasts: are symmetrical.no masses    Right breast exhibits no inverted nipple, no mass, no nipple discharge, no skin change and no tenderness.   Left breast exhibits no inverted nipple, no mass, no nipple discharge, no skin change and no tenderness.  Abdominal: Soft. She exhibits no distension, hernias or masses. There is no tenderness. No enlargement of liver edge or spleen.  There is no rebound and no guarding.   Genitourinary:    External rectal exam shows no thrombosed external hemorrhoids, no lesions.     Pelvic exam was performed with patient supine.   No labial fusion, and symmetrical.    There is no rash, lesion or injury on the right labia.   There is no rash, lesion or injury on the left " labia.   No bleeding and no signs of injury around the vaginal introitus, urethral meatus is normal size and without prolapse or lesions, urethra well supported. The cervix is visualized with no discharge, lesions or friability.   No vaginal discharge found.    No significant Cystocele, Enterocele or rectocele, and cervix and uterus well supported.   Bimanual exam:   The urethra is normal to palpation and there are no palpable vaginal wall masses.   Uterus is not deviated, not enlarged, not fixed, normal shape and not tender.   Cervix exhibits no motion tenderness.    Right adnexum displays no mass or nodularity and no tenderness.   Left adnexum displays no mass or nodularity and no tenderness.  Musculoskeletal: Normal range of motion.   Lymphadenopathy: No inguinal adenopathy present.   Neurological: She is alert and oriented to person, place, and time. Coordination normal.   Skin: Skin is warm and dry. She is not diaphoretic. No rashes, lesions or ulcers.   Psychiatric: She has a normal mood and affect, oriented to person, place, and time.      Assessment:   Normal annual GYN exam  1. Encounter for annual routine gynecological examination  Liquid-Based Pap Smear, Screening      2. Irregular menses  US Pelvis Comp with Transvag NON-OB (xpd      3. Screening mammogram for breast cancer  Mammo Digital Screening Bilat w/ Tee        Menses q 6 weeks, declines OCPs  Rev'd CXR and CT result of chest    Plan:   PAP  Mammogram  ultrasound  Follow up in 3 weeks.  Patient informed will be contacted with results within 2 weeks. Encouraged to please call back or email if she has not heard from us by then.

## 2025-01-02 NOTE — PROGRESS NOTES
Assessment /Plan     For exam results, see Encounter Report.    Refractive error      Contact lens Rx released to pt. Daily wear only advised, with education to risks of extended wear.  Discussed lens care, compliance and solutions. RTC yearly contact lens follow up.                    
Afib with RVR/Event Note
Urology
[facility]

## 2025-01-02 NOTE — TELEPHONE ENCOUNTER
----- Message from [email or web address] sent at 1/2/2025  3:50 PM CST -----  Contact: self  .Type:  Sooner Apoointment Request    Caller is requesting a sooner appointment.  Caller declined first available appointment listed below.  Caller will not accept being placed on the waitlist and is requesting a message be sent to doctor.  Name of Caller:.Monika Connell  When is the first available appointment?  Would the patient rather a call back or a response via [facility]? [facility]   Best Call Back Number:.[phone]  Additional Information: Patient states that her first appt was cancelled and she didn't receive a call to reschedule and would like to reschedule.

## 2025-01-02 NOTE — PROGRESS NOTES
HPI    Pt here for annual eye exam with CL DLS- 12/22/23    Pt denies vision changes.   Happy with current CL. Sleeps in them on occasion.   Denies F/F and GTTS.     Last edited by Noemí Jones on 1/2/2025  8:07 AM.            Assessment /Plan     For exam results, see Encounter Report.    Refractive error      New Spectacle Rx given and Contact lens Rx released to pt. Daily wear only advised, with education to risks of extended wear.  Discussed lens care, compliance and solutions. RTC yearly contact lens follow up. , discussed different options for glasses. RTC 1 year routine eye exam.

## 2025-01-03 LAB
CLINICAL INFO: NORMAL
DATE OF PREVIOUS PAP: NORMAL
DATE PREVIOUS BX: NO
LMP START DATE: NORMAL
SPECIMEN SOURCE CVX/VAG CYTO: NORMAL

## 2025-01-06 NOTE — TELEPHONE ENCOUNTER
I have signed for the following orders AND/OR meds.  Please call the patient and ask the patient to schedule the testing AND/OR inform about any medications that were sent. Medications have been sent to pharmacy listed below      Orders Placed This Encounter   Procedures    Comprehensive Metabolic Panel     Standing Status:   Standing     Number of Occurrences:   99     Standing Expiration Date:   1/1/2045     Order Specific Question:   Send normal result to authorizing provider's In Basket if patient is active on MyChart:     Answer:   No    CBC Auto Differential     Standing Status:   Standing     Number of Occurrences:   20     Standing Expiration Date:   9/15/2038    TSH     Standing Status:   Standing     Number of Occurrences:   99     Standing Expiration Date:   1/1/2045     Order Specific Question:   Send normal result to authorizing provider's In Basket if patient is active on MyChart:     Answer:   No    T4, Free     Standing Status:   Future     Standing Expiration Date:   3/7/2026    Hemoglobin A1C     Standing Status:   Standing     Number of Occurrences:   99     Standing Expiration Date:   1/1/2045     Order Specific Question:   Send normal result to authorizing provider's In Basket if patient is active on MyChart:     Answer:   No              [id number] - [place], [place] - [address] AT [facility] [address]  [address]  [facility] [address]  Phone: [phone] Fax: [phone]    [facility] [address] [address]  [id number] [place] [id number]  Phone: [phone] Fax: [phone]

## 2025-01-07 ENCOUNTER — PATIENT MESSAGE (OUTPATIENT)
Dept: ENDOCRINOLOGY | Facility: CLINIC | Age: 40
End: 2025-01-07
Payer: COMMERCIAL

## 2025-01-07 ENCOUNTER — PATIENT MESSAGE (OUTPATIENT)
Dept: FAMILY MEDICINE | Facility: CLINIC | Age: 40
End: 2025-01-07
Payer: COMMERCIAL

## 2025-01-07 ENCOUNTER — LAB VISIT (OUTPATIENT)
Dept: LAB | Facility: HOSPITAL | Age: 40
End: 2025-01-07
Attending: INTERNAL MEDICINE
Payer: COMMERCIAL

## 2025-01-07 ENCOUNTER — PATIENT MESSAGE (OUTPATIENT)
Dept: OBSTETRICS AND GYNECOLOGY | Facility: CLINIC | Age: 40
End: 2025-01-07
Payer: COMMERCIAL

## 2025-01-07 DIAGNOSIS — E03.9 HYPOTHYROIDISM, UNSPECIFIED TYPE: ICD-10-CM

## 2025-01-07 DIAGNOSIS — Z13.6 ENCOUNTER FOR LIPID SCREENING FOR CARDIOVASCULAR DISEASE: ICD-10-CM

## 2025-01-07 DIAGNOSIS — Z13.220 ENCOUNTER FOR LIPID SCREENING FOR CARDIOVASCULAR DISEASE: ICD-10-CM

## 2025-01-07 DIAGNOSIS — E03.9 HYPOTHYROIDISM, UNSPECIFIED TYPE: Primary | ICD-10-CM

## 2025-01-07 DIAGNOSIS — Z00.00 ENCOUNTER FOR ANNUAL HEALTH EXAMINATION: ICD-10-CM

## 2025-01-07 LAB
ALBUMIN SERPL BCP-MCNC: 4.2 G/DL (ref 3.5–5.2)
ALP SERPL-CCNC: 72 U/L (ref 40–150)
ALT SERPL W/O P-5'-P-CCNC: 14 U/L (ref 10–44)
ANION GAP SERPL CALC-SCNC: 7 MMOL/L (ref 8–16)
AST SERPL-CCNC: 24 U/L (ref 10–40)
BASOPHILS # BLD AUTO: 0.07 K/UL (ref 0–0.2)
BASOPHILS NFR BLD: 1.3 % (ref 0–1.9)
BILIRUB SERPL-MCNC: 0.7 MG/DL (ref 0.1–1)
BUN SERPL-MCNC: 13 MG/DL (ref 6–20)
CALCIUM SERPL-MCNC: 9.1 MG/DL (ref 8.7–10.5)
CHLORIDE SERPL-SCNC: 107 MMOL/L (ref 95–110)
CHOLEST SERPL-MCNC: 274 MG/DL (ref 120–199)
CHOLEST/HDLC SERPL: 4 {RATIO} (ref 2–5)
CO2 SERPL-SCNC: 23 MMOL/L (ref 23–29)
CREAT SERPL-MCNC: 0.7 MG/DL (ref 0.5–1.4)
DIFFERENTIAL METHOD BLD: ABNORMAL
EOSINOPHIL # BLD AUTO: 0.3 K/UL (ref 0–0.5)
EOSINOPHIL NFR BLD: 4.7 % (ref 0–8)
ERYTHROCYTE [DISTWIDTH] IN BLOOD BY AUTOMATED COUNT: 14.9 % (ref 11.5–14.5)
EST. GFR  (NO RACE VARIABLE): >60 ML/MIN/1.73 M^2
ESTIMATED AVG GLUCOSE: 100 MG/DL (ref 68–131)
GLUCOSE SERPL-MCNC: 77 MG/DL (ref 70–110)
HBA1C MFR BLD: 5.1 % (ref 4–5.6)
HCT VFR BLD AUTO: 39.8 % (ref 37–48.5)
HDLC SERPL-MCNC: 69 MG/DL (ref 40–75)
HDLC SERPL: 25.2 % (ref 20–50)
HGB BLD-MCNC: 12.1 G/DL (ref 12–16)
IMM GRANULOCYTES # BLD AUTO: 0.02 K/UL (ref 0–0.04)
IMM GRANULOCYTES NFR BLD AUTO: 0.4 % (ref 0–0.5)
LDLC SERPL CALC-MCNC: 191.8 MG/DL (ref 63–159)
LYMPHOCYTES # BLD AUTO: 1.4 K/UL (ref 1–4.8)
LYMPHOCYTES NFR BLD: 24.7 % (ref 18–48)
MCH RBC QN AUTO: 27.1 PG (ref 27–31)
MCHC RBC AUTO-ENTMCNC: 30.4 G/DL (ref 32–36)
MCV RBC AUTO: 89 FL (ref 82–98)
MONOCYTES # BLD AUTO: 0.5 K/UL (ref 0.3–1)
MONOCYTES NFR BLD: 8.3 % (ref 4–15)
NEUTROPHILS # BLD AUTO: 3.4 K/UL (ref 1.8–7.7)
NEUTROPHILS NFR BLD: 60.6 % (ref 38–73)
NONHDLC SERPL-MCNC: 205 MG/DL
NRBC BLD-RTO: 0 /100 WBC
PLATELET # BLD AUTO: 317 K/UL (ref 150–450)
PMV BLD AUTO: 9.4 FL (ref 9.2–12.9)
POTASSIUM SERPL-SCNC: 4 MMOL/L (ref 3.5–5.1)
PROT SERPL-MCNC: 8.1 G/DL (ref 6–8.4)
RBC # BLD AUTO: 4.47 M/UL (ref 4–5.4)
SODIUM SERPL-SCNC: 137 MMOL/L (ref 136–145)
T4 FREE SERPL-MCNC: 1.12 NG/DL (ref 0.71–1.51)
TRIGL SERPL-MCNC: 66 MG/DL (ref 30–150)
TSH SERPL DL<=0.005 MIU/L-ACNC: 6.34 UIU/ML (ref 0.4–4)
WBC # BLD AUTO: 5.55 K/UL (ref 3.9–12.7)

## 2025-01-07 PROCEDURE — 80061 LIPID PANEL: CPT | Performed by: INTERNAL MEDICINE

## 2025-01-07 PROCEDURE — 84439 ASSAY OF FREE THYROXINE: CPT | Performed by: INTERNAL MEDICINE

## 2025-01-07 PROCEDURE — 84443 ASSAY THYROID STIM HORMONE: CPT | Performed by: INTERNAL MEDICINE

## 2025-01-07 PROCEDURE — 85025 COMPLETE CBC W/AUTO DIFF WBC: CPT | Performed by: INTERNAL MEDICINE

## 2025-01-07 PROCEDURE — 83036 HEMOGLOBIN GLYCOSYLATED A1C: CPT | Performed by: INTERNAL MEDICINE

## 2025-01-07 PROCEDURE — 80053 COMPREHEN METABOLIC PANEL: CPT | Performed by: INTERNAL MEDICINE

## 2025-01-07 PROCEDURE — 36415 COLL VENOUS BLD VENIPUNCTURE: CPT | Mod: PO | Performed by: INTERNAL MEDICINE

## 2025-01-08 RX ORDER — LEVOTHYROXINE SODIUM 150 UG/1
150 TABLET ORAL
Qty: 30 TABLET | Refills: 11 | Status: SHIPPED | OUTPATIENT
Start: 2025-01-08 | End: 2026-01-08

## 2025-01-09 ENCOUNTER — OFFICE VISIT (OUTPATIENT)
Dept: FAMILY MEDICINE | Facility: CLINIC | Age: 40
End: 2025-01-09
Payer: COMMERCIAL

## 2025-01-09 DIAGNOSIS — E03.8 OTHER SPECIFIED HYPOTHYROIDISM: ICD-10-CM

## 2025-01-09 DIAGNOSIS — E78.2 MIXED HYPERLIPIDEMIA: ICD-10-CM

## 2025-01-09 DIAGNOSIS — F41.8 DEPRESSION WITH ANXIETY: Primary | ICD-10-CM

## 2025-01-09 RX ORDER — SERTRALINE HYDROCHLORIDE 100 MG/1
100 TABLET, FILM COATED ORAL DAILY
Qty: 90 TABLET | Refills: 3 | Status: SHIPPED | OUTPATIENT
Start: 2025-01-09 | End: 2026-01-09

## 2025-01-09 NOTE — Clinical Note
Please add a lipid to labs on 2/19 and change to fasting Also needs annual clinic appt with Aleida 2/20 @ 7:40

## 2025-01-09 NOTE — PROGRESS NOTES
Primary Care Telemedicine Note  The patient location is:  Patient Home   The chief complaint leading to consultation is: lab review  Total time spent with patient: 15 min    Visit type: Virtual visit with synchronous audio and video  Each patient to whom he or she provides medical services by telemedicine is:  (1) informed of the relationship between the physician and patient and the respective role of any other health care provider with respect to management of the patient; and (2) notified that he or she may decline to receive medical services by telemedicine and may withdraw from such care at any time.    Assessment/Plan:    Problem List Items Addressed This Visit          Psychiatric    Depression with anxiety - Primary    Overview     -previously followed by Dr. Lopez with psychiatry but no longer seeing psychiatry   -symptoms controlled with zoloft 100 mg QD  -denies AE of medications  -plan to continue         Relevant Medications    sertraline (ZOLOFT) 100 MG tablet       Cardiac/Vascular    Mixed hyperlipidemia    Overview     -chronic condition  -recent worsening of LDL but could be effect of inadequately treated hypothyroid  -will repeat lipid levels with next thyroid studies to see if improved with Synthroid dosage adjustment   -continue recommended diet and exercise            Endocrine    Hypothyroidism    Overview     Lab Results   Component Value Date    TSH 6.340 (H) 01/07/2025   -endocrinology increased Synthroid from 137 mcg->150 mcg just yesterday  -repeat labs scheduled in 6 weeks  -followed by Dr. Pool mcneal MD  _____________________________________________________________________________________________________________________________________________________    HPI:    Patient is an established patient who presents today via virtual visit.    History of Present Illness  The patient is a 39-year-old female who is presenting virtually to review labs.    She has been  diagnosed with hypothyroidism and is currently on a regimen of levothyroxine 137 mcg. Her TSH levels were found to be elevated, prompting Dr. Lanza to increase her levothyroxine dosage to 150 mcg. She picked up the medication today and plans to start taking tomorrow. Repeat labs are scheduled in 6 weeks. She reports weight gain, which she attributes to the change in her TSH levels.    She is also on Zoloft, which has been effective in managing her skin picking and denies any adverse effects since increasing the dose from 75 mg -> 100 mg QD.     She continues to tolerate Adderall well.     No other new complaints today.      Past Medical History:  Past Medical History:   Diagnosis Date    ADHD (attention deficit hyperactivity disorder)     Allergy     Anxiety     Depression     Thyroid disease        Current Outpatient Medications on File Prior to Visit   Medication Sig Dispense Refill    dextroamphetamine-amphetamine (ADDERALL) 20 mg tablet Take 1 tablet by mouth 2 (two) times daily. 60 tablet 0    [START ON 1/25/2025] dextroamphetamine-amphetamine (ADDERALL) 20 mg tablet Take 1 tablet by mouth 2 (two) times daily. 60 tablet 0    multivit with min-folic acid 200 mcg Chew Take 1 tablet by mouth once daily.      SYNTHROID 150 mcg tablet Take 1 tablet (150 mcg total) by mouth before breakfast. Brand synthroid 30 tablet 11    [DISCONTINUED] sertraline (ZOLOFT) 100 MG tablet Take 1 tablet (100 mg total) by mouth once daily. 90 tablet 0    [DISCONTINUED] progesterone (PROMETRIUM) 200 MG capsule Take 1 capsule (200 mg total) by mouth nightly. (Patient not taking: Reported on 1/9/2025) 12 capsule 11     No current facility-administered medications on file prior to visit.       Review of Systems   Constitutional:  Negative for chills, fever and malaise/fatigue.        Weight gain   HENT:  Negative for hearing loss.    Eyes:  Negative for discharge.   Respiratory:  Negative for cough, shortness of breath and wheezing.     Cardiovascular:  Negative for chest pain, palpitations and leg swelling.   Gastrointestinal:  Negative for abdominal pain, blood in stool, constipation, diarrhea and vomiting.   Genitourinary:  Negative for dysuria, frequency and hematuria.   Musculoskeletal:  Negative for back pain, joint pain and neck pain.   Neurological:  Negative for weakness and headaches.   Endo/Heme/Allergies:  Negative for polydipsia.   Psychiatric/Behavioral:  Negative for depression. The patient is not nervous/anxious.          Physical Exam   There were no vitals filed for this visit.   .FLOWAMB[14   BMI Readings from Last 1 Encounters:   01/02/25 34.88 kg/m²       Physical Exam  Constitutional:       General: She is not in acute distress.     Appearance: She is well-developed.   HENT:      Head: Normocephalic and atraumatic.   Pulmonary:      Effort: Pulmonary effort is normal. No respiratory distress.   Abdominal:      General: There is no distension.   Musculoskeletal:         General: Normal range of motion.      Cervical back: Normal range of motion.   Neurological:      Mental Status: She is alert and oriented to person, place, and time.   Psychiatric:         Mood and Affect: Mood normal.         Behavior: Behavior normal.         Thought Content: Thought content normal.         Judgment: Judgment normal.           DISCLAIMER: This note was compiled by using a speech recognition dictation system and therefore please be aware that typographical / speech recognition errors can and do occur.  Please contact me if you see any errors specifically.  Consent was obtained for [employer] recording system prior to the visit.  Answers submitted by the patient for this visit:  Review of Systems Questionnaire (Submitted on 1/8/2025)  activity change: No  unexpected weight change: Yes  rhinorrhea: No  trouble swallowing: No  visual disturbance: No  chest tightness: No  polyuria: No  difficulty urinating: No  menstrual problem: No  joint swelling:  No  arthralgias: No  confusion: No  dysphoric mood: No

## 2025-01-18 ENCOUNTER — PATIENT MESSAGE (OUTPATIENT)
Dept: OBSTETRICS AND GYNECOLOGY | Facility: CLINIC | Age: 40
End: 2025-01-18
Payer: COMMERCIAL

## 2025-01-18 ENCOUNTER — HOSPITAL ENCOUNTER (OUTPATIENT)
Dept: RADIOLOGY | Facility: HOSPITAL | Age: 40
Discharge: HOME | End: 2025-01-18
Attending: OBSTETRICS & GYNECOLOGY
Payer: COMMERCIAL

## 2025-01-18 VITALS — BODY MASS INDEX: 34.96 KG/M2 | HEIGHT: 62 IN | WEIGHT: 190 LBS

## 2025-01-18 DIAGNOSIS — Z12.31 SCREENING MAMMOGRAM FOR BREAST CANCER: ICD-10-CM

## 2025-01-18 DIAGNOSIS — N92.6 IRREGULAR MENSES: Primary | ICD-10-CM

## 2025-01-18 PROCEDURE — 77063 BREAST TOMOSYNTHESIS BI: CPT | Mod: 26,,, | Performed by: RADIOLOGY

## 2025-01-18 PROCEDURE — 77063 BREAST TOMOSYNTHESIS BI: CPT | Mod: TC,PO

## 2025-01-18 PROCEDURE — 77067 SCR MAMMO BI INCL CAD: CPT | Mod: 26,,, | Performed by: RADIOLOGY

## 2025-01-24 ENCOUNTER — LAB VISIT (OUTPATIENT)
Dept: LAB | Facility: HOSPITAL | Age: 40
End: 2025-01-24
Payer: COMMERCIAL

## 2025-01-24 DIAGNOSIS — N92.6 IRREGULAR MENSES: ICD-10-CM

## 2025-01-24 LAB
ESTRADIOL SERPL-MCNC: 16 PG/ML
FSH SERPL-ACNC: 6.4 MIU/ML
T4 FREE SERPL-MCNC: 1.19 NG/DL (ref 0.71–1.51)

## 2025-01-24 PROCEDURE — 83001 ASSAY OF GONADOTROPIN (FSH): CPT | Performed by: OBSTETRICS & GYNECOLOGY

## 2025-01-24 PROCEDURE — 36415 COLL VENOUS BLD VENIPUNCTURE: CPT | Mod: PO | Performed by: OBSTETRICS & GYNECOLOGY

## 2025-01-24 PROCEDURE — 84439 ASSAY OF FREE THYROXINE: CPT | Performed by: OBSTETRICS & GYNECOLOGY

## 2025-01-24 PROCEDURE — 82670 ASSAY OF TOTAL ESTRADIOL: CPT | Performed by: OBSTETRICS & GYNECOLOGY

## 2025-01-27 ENCOUNTER — OFFICE VISIT (OUTPATIENT)
Dept: ENDOCRINOLOGY | Facility: CLINIC | Age: 40
End: 2025-01-27
Payer: COMMERCIAL

## 2025-01-27 DIAGNOSIS — E03.9 HYPOTHYROIDISM, UNSPECIFIED TYPE: Primary | ICD-10-CM

## 2025-01-27 PROCEDURE — 1159F MED LIST DOCD IN RCRD: CPT | Mod: CPTII,95,, | Performed by: INTERNAL MEDICINE

## 2025-01-27 PROCEDURE — 3044F HG A1C LEVEL LT 7.0%: CPT | Mod: CPTII,95,, | Performed by: INTERNAL MEDICINE

## 2025-01-27 PROCEDURE — 98005 SYNCH AUDIO-VIDEO EST LOW 20: CPT | Mod: 95,,, | Performed by: INTERNAL MEDICINE

## 2025-01-27 PROCEDURE — 1160F RVW MEDS BY RX/DR IN RCRD: CPT | Mod: CPTII,95,, | Performed by: INTERNAL MEDICINE

## 2025-01-27 NOTE — PROGRESS NOTES
The patient location is: [place]    Visit type: audiovisual    Face to Face time with patient: 11  14 minutes of total time spent on the encounter, which includes face to face time and non-face to face time preparing to see the patient (eg, review of tests), Obtaining and/or reviewing separately obtained history, Documenting clinical information in the electronic or other health record, Independently interpreting results (not separately reported) and communicating results to the patient/family/caregiver, or Care coordination (not separately reported).         Each patient to whom he or she provides medical services by telemedicine is:  (1) informed of the relationship between the physician and patient and the respective role of any other health care provider with respect to management of the patient; and (2) notified that he or she may decline to receive medical services by telemedicine and may withdraw from such care at any time.    Notes:        CHIEF COMPLAINT: Hypothyroidism  39 y.o.  female here for followup. Currently on Synthroid 150 mcg. On brandname.  Started today. No palpitations. NO tremors. Has gained weight in the past year. States that diet has not changed. Has brain fog. Has been using CPAP. Has some insomnia. No diarrhea or constipation. No cold intolerance.               PAST MEDICAL HISTORY: Hypothyroid. PCOS.     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         PE: virtual visit       Latest Reference Range & Units 01/24/25 08:49   Free T4 0.71 - 1.51 ng/dL 1.19           A/P  1. Hypothyroidism- TPO+.  On brand-name Synthroid.  Dose recently increased, and she just started today.  Therefore, we will push back labs for 6 weeks.  She is concerned about weight gain  despite no changes in diet.  She does have a sedentary job.  Discussed importance of adding exercise routine from both a weight standpoint and overall health.  Discussed doing combinations of cardio and resistance training.    2. PCOS- being followed by OB.     FOLLOWUP  Move Feb 19 labs to 6 weeks from now.   F/U 1 yr with TSH

## 2025-01-30 ENCOUNTER — PATIENT MESSAGE (OUTPATIENT)
Dept: PULMONOLOGY | Facility: CLINIC | Age: 40
End: 2025-01-30
Payer: COMMERCIAL

## 2025-01-31 ENCOUNTER — OFFICE VISIT (OUTPATIENT)
Dept: OBSTETRICS AND GYNECOLOGY | Facility: CLINIC | Age: 40
End: 2025-01-31
Payer: COMMERCIAL

## 2025-01-31 VITALS
WEIGHT: 187.38 LBS | BODY MASS INDEX: 34.27 KG/M2 | SYSTOLIC BLOOD PRESSURE: 110 MMHG | DIASTOLIC BLOOD PRESSURE: 70 MMHG

## 2025-01-31 DIAGNOSIS — N92.6 IRREGULAR MENSES: Primary | ICD-10-CM

## 2025-01-31 PROCEDURE — 99214 OFFICE O/P EST MOD 30 MIN: CPT | Mod: S$GLB,,, | Performed by: OBSTETRICS & GYNECOLOGY

## 2025-01-31 PROCEDURE — 1159F MED LIST DOCD IN RCRD: CPT | Mod: CPTII,S$GLB,, | Performed by: OBSTETRICS & GYNECOLOGY

## 2025-01-31 PROCEDURE — 3078F DIAST BP <80 MM HG: CPT | Mod: CPTII,S$GLB,, | Performed by: OBSTETRICS & GYNECOLOGY

## 2025-01-31 PROCEDURE — 1160F RVW MEDS BY RX/DR IN RCRD: CPT | Mod: CPTII,S$GLB,, | Performed by: OBSTETRICS & GYNECOLOGY

## 2025-01-31 PROCEDURE — 3074F SYST BP LT 130 MM HG: CPT | Mod: CPTII,S$GLB,, | Performed by: OBSTETRICS & GYNECOLOGY

## 2025-01-31 PROCEDURE — 3044F HG A1C LEVEL LT 7.0%: CPT | Mod: CPTII,S$GLB,, | Performed by: OBSTETRICS & GYNECOLOGY

## 2025-01-31 PROCEDURE — 3008F BODY MASS INDEX DOCD: CPT | Mod: CPTII,S$GLB,, | Performed by: OBSTETRICS & GYNECOLOGY

## 2025-01-31 PROCEDURE — 99999 PR PBB SHADOW E&M-EST. PATIENT-LVL III: CPT | Mod: PBBFAC,,, | Performed by: OBSTETRICS & GYNECOLOGY

## 2025-01-31 RX ORDER — NORETHINDRONE ACETATE AND ETHINYL ESTRADIOL .02; 1 MG/1; MG/1
1 TABLET ORAL DAILY
Qty: 30 TABLET | Refills: 11 | Status: SHIPPED | OUTPATIENT
Start: 2025-01-31 | End: 2026-01-31

## 2025-01-31 NOTE — PROGRESS NOTES
Chief Complaint   Patient presents with    Follow-up     Fu to lab work        History of Present Illness   39 y.o. WF   patient presents today for hot flashes and irreg menses, VMI, had labs and sono    Past medical and surgical history reviewed.   I have reviewed the patient's medical history in detail and updated the computerized patient record.    Review of patient's allergies indicates:   Allergen Reactions    Pcn [penicillins] Hives    Clindamycin Itching and Rash         Review of Systems -   GEN ROS: negative  Breast ROS: negative for breast lumps  Genito-Urinary ROS: irreg menses      Physical Examination:  /70 (BP Location: Right arm, Patient Position: Sitting)   Wt 85 kg (187 lb 6.3 oz)   LMP 2025 (Exact Date)   BMI 34.27 kg/m²    Sono: thin endom  Nl ovaries    Labs nl FSH but low midcyle estrogen 16      Assessment:  Low estradiol mid cycle   1. Irregular menses            Plan:  Discussed shalom menopause, anov cycles, low estradiol  Trial low dose OCP 3 mos  Patient informed will be contacted with results within 2 weeks. Encouraged to please call back or email if she has not heard from us by then.

## 2025-02-05 ENCOUNTER — PATIENT MESSAGE (OUTPATIENT)
Dept: ENDOCRINOLOGY | Facility: CLINIC | Age: 40
End: 2025-02-05
Payer: COMMERCIAL

## 2025-02-06 ENCOUNTER — PATIENT MESSAGE (OUTPATIENT)
Dept: FAMILY MEDICINE | Facility: CLINIC | Age: 40
End: 2025-02-06
Payer: COMMERCIAL

## 2025-02-06 DIAGNOSIS — E03.9 HYPOTHYROIDISM, UNSPECIFIED TYPE: Primary | ICD-10-CM

## 2025-02-17 NOTE — TELEPHONE ENCOUNTER
OK to add to upcoming lab apt.     I have signed for the following orders AND/OR meds.  Please call the patient and ask the patient to schedule the testing AND/OR inform about any medications that were sent. Medications have been sent to pharmacy listed below      Orders Placed This Encounter   Procedures    Thyroid Peroxidase Antibody     Standing Status:   Future     Expected Date:   2/17/2025     Expiration Date:   4/18/2026     Send normal result to authorizing provider's In Basket if patient is active on MyChart::   Yes    T4, Free     Standing Status:   Future     Expected Date:   2/17/2025     Expiration Date:   4/18/2026     Send normal result to authorizing provider's In Basket if patient is active on MyChart::   Yes    T3     Standing Status:   Future     Expected Date:   2/17/2025     Expiration Date:   4/18/2026     Send normal result to authorizing provider's In Basket if patient is active on MyChart::   Yes              [id number] - [place], [place] - [address] AT [facility] OF [facility] [address] [place]  [address] [facility] [address]  Phone: [phone] Fax: [phone]    [facility] - [facility] [id number] [place] [address]  Phone: [phone] Fax: [phone]

## 2025-03-11 ENCOUNTER — LAB VISIT (OUTPATIENT)
Dept: LAB | Facility: HOSPITAL | Age: 40
End: 2025-03-11
Attending: INTERNAL MEDICINE
Payer: COMMERCIAL

## 2025-03-11 DIAGNOSIS — E03.9 HYPOTHYROIDISM, UNSPECIFIED TYPE: ICD-10-CM

## 2025-03-11 DIAGNOSIS — Z13.6 ENCOUNTER FOR LIPID SCREENING FOR CARDIOVASCULAR DISEASE: ICD-10-CM

## 2025-03-11 DIAGNOSIS — Z00.00 ENCOUNTER FOR ANNUAL HEALTH EXAMINATION: ICD-10-CM

## 2025-03-11 DIAGNOSIS — Z13.220 ENCOUNTER FOR LIPID SCREENING FOR CARDIOVASCULAR DISEASE: ICD-10-CM

## 2025-03-11 LAB
CHOLEST SERPL-MCNC: 273 MG/DL (ref 120–199)
CHOLEST/HDLC SERPL: 4 {RATIO} (ref 2–5)
HDLC SERPL-MCNC: 68 MG/DL (ref 40–75)
HDLC SERPL: 24.9 % (ref 20–50)
LDLC SERPL CALC-MCNC: 188.2 MG/DL (ref 63–159)
NONHDLC SERPL-MCNC: 205 MG/DL
T3 SERPL-MCNC: 88 NG/DL (ref 60–180)
T4 FREE SERPL-MCNC: 1.15 NG/DL (ref 0.71–1.51)
THYROPEROXIDASE IGG SERPL-ACNC: 404.4 IU/ML
TRIGL SERPL-MCNC: 84 MG/DL (ref 30–150)
TSH SERPL DL<=0.005 MIU/L-ACNC: 1.34 UIU/ML (ref 0.4–4)

## 2025-03-11 PROCEDURE — 84443 ASSAY THYROID STIM HORMONE: CPT | Performed by: INTERNAL MEDICINE

## 2025-03-11 PROCEDURE — 36415 COLL VENOUS BLD VENIPUNCTURE: CPT | Mod: PO | Performed by: INTERNAL MEDICINE

## 2025-03-11 PROCEDURE — 84439 ASSAY OF FREE THYROXINE: CPT | Performed by: PHYSICIAN ASSISTANT

## 2025-03-11 PROCEDURE — 86376 MICROSOMAL ANTIBODY EACH: CPT | Performed by: PHYSICIAN ASSISTANT

## 2025-03-11 PROCEDURE — 80061 LIPID PANEL: CPT | Performed by: INTERNAL MEDICINE

## 2025-03-11 PROCEDURE — 84480 ASSAY TRIIODOTHYRONINE (T3): CPT | Performed by: PHYSICIAN ASSISTANT

## 2025-03-12 ENCOUNTER — RESULTS FOLLOW-UP (OUTPATIENT)
Dept: FAMILY MEDICINE | Facility: CLINIC | Age: 40
End: 2025-03-12

## 2025-03-12 ENCOUNTER — RESULTS FOLLOW-UP (OUTPATIENT)
Dept: ENDOCRINOLOGY | Facility: CLINIC | Age: 40
End: 2025-03-12

## 2025-03-12 NOTE — PROGRESS NOTES
Results have been released via [employer]. Please verify that these have been viewed by patient. If not, please call patient with results.     I have sent a message to them with the following interpretation (see below).    I have reviewed your recent blood work.     T3 and free T4 are normal. TPO antibodies are elevated. This has been seen on previous labs. Please make sure to follow up with endocrinology as recommended.     Please do not hesitate to call or message with any additional questions or concerns.    Aleida Wallace PA-C

## 2025-03-24 ENCOUNTER — PATIENT MESSAGE (OUTPATIENT)
Dept: ENDOCRINOLOGY | Facility: CLINIC | Age: 40
End: 2025-03-24
Payer: COMMERCIAL

## 2025-03-24 RX ORDER — LEVOTHYROXINE SODIUM 150 UG/1
150 TABLET ORAL
Qty: 30 TABLET | Refills: 11 | Status: SHIPPED | OUTPATIENT
Start: 2025-03-24 | End: 2026-03-24

## 2025-03-27 ENCOUNTER — RESULTS FOLLOW-UP (OUTPATIENT)
Dept: FAMILY MEDICINE | Facility: CLINIC | Age: 40
End: 2025-03-27
Payer: COMMERCIAL

## 2025-03-29 ENCOUNTER — PATIENT MESSAGE (OUTPATIENT)
Dept: FAMILY MEDICINE | Facility: CLINIC | Age: 40
End: 2025-03-29
Payer: COMMERCIAL

## 2025-04-04 ENCOUNTER — TELEPHONE (OUTPATIENT)
Dept: ENDOCRINOLOGY | Facility: CLINIC | Age: 40
End: 2025-04-04
Payer: COMMERCIAL

## 2025-04-04 NOTE — TELEPHONE ENCOUNTER
Initiated PA for Synthroid 150MCG tablets via CoverMyMeds     Key: [id number]    PA Case ID #: [id number]    Rx #: [id number]    Awaiting determination

## 2025-04-09 ENCOUNTER — TELEPHONE (OUTPATIENT)
Dept: ENDOCRINOLOGY | Facility: CLINIC | Age: 40
End: 2025-04-09
Payer: COMMERCIAL

## 2025-04-09 NOTE — TELEPHONE ENCOUNTER
Rec'd fax from [employer]. Brand Geni approved for 6 months through 10/09/2025. Ref #[id number].

## 2025-04-11 ENCOUNTER — TELEPHONE (OUTPATIENT)
Dept: ENDOCRINOLOGY | Facility: CLINIC | Age: 40
End: 2025-04-11
Payer: COMMERCIAL

## 2025-04-22 ENCOUNTER — TELEPHONE (OUTPATIENT)
Dept: PRIMARY CARE CLINIC | Facility: CLINIC | Age: 40
End: 2025-04-22
Payer: COMMERCIAL

## 2025-04-22 NOTE — TELEPHONE ENCOUNTER
----- Message from Leonel Saeed MA sent at 4/16/2025  7:41 AM CDT -----      ----- Message -----  From: Leonel Saeed MA  Sent: 4/15/2025   7:40 AM CDT  To: Kika Casarez MD    ----- Message from Leonel Saeed MA sent at 4/15/2025  7:40 AM CDT -----

## 2025-04-24 ENCOUNTER — OFFICE VISIT (OUTPATIENT)
Dept: FAMILY MEDICINE | Facility: CLINIC | Age: 40
End: 2025-04-24
Payer: COMMERCIAL

## 2025-04-24 DIAGNOSIS — E03.8 OTHER SPECIFIED HYPOTHYROIDISM: ICD-10-CM

## 2025-04-24 DIAGNOSIS — F41.8 DEPRESSION WITH ANXIETY: ICD-10-CM

## 2025-04-24 DIAGNOSIS — L98.9 SKIN LESION OF FACE: Primary | ICD-10-CM

## 2025-04-24 DIAGNOSIS — F98.8 ATTENTION DEFICIT DISORDER, UNSPECIFIED TYPE: ICD-10-CM

## 2025-04-24 PROCEDURE — 3044F HG A1C LEVEL LT 7.0%: CPT | Mod: CPTII,95,, | Performed by: PHYSICIAN ASSISTANT

## 2025-04-24 PROCEDURE — 1159F MED LIST DOCD IN RCRD: CPT | Mod: CPTII,95,, | Performed by: PHYSICIAN ASSISTANT

## 2025-04-24 PROCEDURE — 1160F RVW MEDS BY RX/DR IN RCRD: CPT | Mod: CPTII,95,, | Performed by: PHYSICIAN ASSISTANT

## 2025-04-24 PROCEDURE — G2211 COMPLEX E/M VISIT ADD ON: HCPCS | Mod: 95,,, | Performed by: PHYSICIAN ASSISTANT

## 2025-04-24 PROCEDURE — 98006 SYNCH AUDIO-VIDEO EST MOD 30: CPT | Mod: 95,,, | Performed by: PHYSICIAN ASSISTANT

## 2025-04-24 RX ORDER — DOXYCYCLINE 100 MG/1
100 CAPSULE ORAL 2 TIMES DAILY
Qty: 20 CAPSULE | Refills: 0 | Status: SHIPPED | OUTPATIENT
Start: 2025-04-24 | End: 2025-05-04

## 2025-04-24 RX ORDER — TRAZODONE HYDROCHLORIDE 50 MG/1
50 TABLET ORAL NIGHTLY
COMMUNITY

## 2025-04-24 RX ORDER — SERTRALINE HYDROCHLORIDE 50 MG/1
50 TABLET, FILM COATED ORAL DAILY
COMMUNITY

## 2025-04-24 RX ORDER — TIZANIDINE HYDROCHLORIDE 4 MG/1
CAPSULE, GELATIN COATED ORAL
COMMUNITY

## 2025-04-24 RX ORDER — MUPIROCIN 20 MG/G
OINTMENT TOPICAL 2 TIMES DAILY
Qty: 30 G | Refills: 5 | Status: SHIPPED | OUTPATIENT
Start: 2025-04-24

## 2025-04-24 NOTE — PROGRESS NOTES
Primary Care Telemedicine Note  The patient location is: Patient Home   The chief complaint leading to consultation is: Skin lesion  Total time spent with patient: 15 minutes    Visit type: Virtual visit with synchronous audio and video  Each patient to whom he or she provides medical services by telemedicine is: (1) informed of the relationship between the physician and patient and the respective role of any other health care provider with respect to management of the patient; and (2) notified that he or she may decline to receive medical services by telemedicine and may withdraw from such care at any time.      Assessment/Plan:    1. Skin lesion of face  -     doxycycline (VIBRAMYCIN) 100 MG Cap; Take 1 capsule (100 mg total) by mouth 2 (two) times daily. for 10 days  Dispense: 20 capsule; Refill: 0  -     mupirocin (BACTROBAN) 2 % ointment; Apply topically 2 (two) times daily.  Dispense: 30 g; Refill: 5    2. Depression with anxiety  Overview:  -chronic, stable  -symptoms controlled with zoloft 150 mg QD  -also on nightly Trazodone   -denies AE of medications  -followed by Dr. Lopez with psychiatry       3. Attention deficit disorder, unspecified type  Overview:  -chronic; stable on current medication  -currently on adderall 20 mg BID  -denies AE of medications  -followed by Dr. Lopez with psychiatry      4. Other specified hypothyroidism  Overview:  Lab Results   Component Value Date    TSH 1.337 03/11/2025   -remains on Synthroid 150 mcg daily  -followed by Dr. Pool mcneal      Visit today included increased complexity associated with the care of the episodic problem addressed and managing the longitudinal care of the patient due to the serious and/or complex managed problem(s).    Follow up if symptoms worsen or fail to improve.    Aleida Wallace PA-C  _____________________________________________________________________________________________________________________________________________________    CC:  skin lesion    HPI: Patient is an established patient who presents today via virtual visit for skin lesion.    DERMATOLOGIC:  She presents with skin lesions, most notably on the right side of her nose which is red, swollen, and draining fluid. She denies pain associated with the nose lesion. Additional lesions are present but less severe than the nasal lesion. The lesions are related to skin picking behavior. She has been self-treating with topical mupirocin.     PSYCHIATRIC:  She is currently under the care of Dr. Lopez for management of skin picking, ADD, anxiety, and depression.    CURRENT MEDICATIONS:  She takes Zoloft 150 mg, Adderall 20 mg, Synthroid 150 mg, Trazodone 50 mg at night, and Tizanidine.    No other complaints today.     Past Medical History:   Diagnosis Date    ADHD (attention deficit hyperactivity disorder)     Allergy     Anxiety     Depression     Thyroid disease      Past Surgical History:   Procedure Laterality Date     SECTION       SECTION N/A 2021    Procedure:  SECTION;  Surgeon: Sai Harris MD;  Location: [facility];  Service: OB/GYN;  Laterality: N/A;    EYE SURGERY  one at 5 yo and one at 9 yo    STRABISMUS SURGERY       Review of patient's allergies indicates:   Allergen Reactions    Pcn [penicillins] Hives    Clindamycin Itching and Rash     Social History[1]  Family History   Problem Relation Name Age of Onset    No Known Problems Mother      No Known Problems Father      No Known Problems Sister      No Known Problems Daughter      No Known Problems Maternal Aunt      No Known Problems Maternal Uncle      No Known Problems Paternal Aunt      No Known Problems Paternal Uncle      Macular degeneration Maternal Grandmother      No Known Problems Maternal Grandfather      Breast cancer Paternal Grandmother      Macular degeneration Paternal Grandfather      No Known Problems Other      Ovarian cancer Neg Hx      Glaucoma Neg Hx      BRCA 1/2 Neg Hx        Medications Ordered Prior to Encounter[2]    Review of Systems   Constitutional:  Negative for chills, fever and malaise/fatigue.   HENT:  Negative for hearing loss.    Eyes:  Negative for discharge.   Respiratory:  Negative for cough, shortness of breath and wheezing.    Cardiovascular:  Negative for chest pain, palpitations and leg swelling.   Gastrointestinal:  Negative for abdominal pain, blood in stool, constipation, diarrhea and vomiting.   Genitourinary:  Negative for dysuria, frequency and hematuria.   Musculoskeletal:  Negative for back pain, joint pain and neck pain.   Neurological:  Negative for weakness and headaches.   Endo/Heme/Allergies:  Negative for polydipsia.   Psychiatric/Behavioral:  Negative for depression. The patient is not nervous/anxious.        Physical Exam  Constitutional:       General: She is not in acute distress.     Appearance: She is well-developed.   HENT:      Head: Normocephalic and atraumatic.   Pulmonary:      Effort: Pulmonary effort is normal. No respiratory distress.   Abdominal:      General: There is no distension.   Musculoskeletal:         General: Normal range of motion.      Cervical back: Normal range of motion.   Skin:     Findings: Lesion present.      Comments: Multiple lesions present on R side of nose, forehead, cheeks and chin   Neurological:      Mental Status: She is alert and oriented to person, place, and time.   Psychiatric:         Mood and Affect: Mood normal.         Behavior: Behavior normal.         The patient's Health Maintenance was reviewed and the following appears to be due at this time:  Health Maintenance Due   Topic Date Due    Pneumococcal Vaccines (Age 0-49) (1 of 2 - PCV) Never done    Influenza Vaccine (1) 09/01/2024    COVID-19 Vaccine (4 - 2024-25 season) 09/01/2024     DISCLAIMER: This note was compiled by using a speech recognition dictation system and therefore please be aware that typographical / speech recognition errors can  and do occur.  Please contact me if you see any errors specifically.  Consent was obtained for [employer] recording system prior to the visit.          [1]   Social History  Tobacco Use    Smoking status: Former     Current packs/day: 0.00     Types: Cigarettes     Quit date: 2011     Years since quittin.4    Smokeless tobacco: Never   Substance Use Topics    Alcohol use: No    Drug use: Never   [2]   Current Outpatient Medications on File Prior to Visit   Medication Sig Dispense Refill    sertraline (ZOLOFT) 50 MG tablet Take 50 mg by mouth once daily.      tiZANidine 4 mg Cap Take by mouth.      traZODone (DESYREL) 50 MG tablet Take 50 mg by mouth every evening.      dextroamphetamine-amphetamine (ADDERALL) 20 mg tablet Take 1 tablet by mouth 2 (two) times daily. 60 tablet 0    multivit with min-folic acid 200 mcg Chew Take 1 tablet by mouth once daily.      norethindrone-ethinyl estradiol (MICROGESTIN /20) 1-20 mg-mcg per tablet Take 1 tablet by mouth once daily. 30 tablet 11    sertraline (ZOLOFT) 100 MG tablet Take 1 tablet (100 mg total) by mouth once daily. 90 tablet 3    SYNTHROID 150 mcg tablet Take 1 tablet (150 mcg total) by mouth before breakfast. Brand synthroid 30 tablet 11     No current facility-administered medications on file prior to visit.